# Patient Record
Sex: FEMALE | Race: WHITE | NOT HISPANIC OR LATINO | Employment: OTHER | URBAN - METROPOLITAN AREA
[De-identification: names, ages, dates, MRNs, and addresses within clinical notes are randomized per-mention and may not be internally consistent; named-entity substitution may affect disease eponyms.]

---

## 2022-01-24 PROCEDURE — U0004 COV-19 TEST NON-CDC HGH THRU: HCPCS | Performed by: FAMILY MEDICINE

## 2022-04-05 ENCOUNTER — OFFICE VISIT (OUTPATIENT)
Dept: FAMILY MEDICINE CLINIC | Facility: CLINIC | Age: 67
End: 2022-04-05

## 2022-04-05 VITALS
HEIGHT: 65 IN | WEIGHT: 172 LBS | BODY MASS INDEX: 28.66 KG/M2 | HEART RATE: 43 BPM | DIASTOLIC BLOOD PRESSURE: 88 MMHG | OXYGEN SATURATION: 97 % | SYSTOLIC BLOOD PRESSURE: 138 MMHG

## 2022-04-05 DIAGNOSIS — Z12.31 SCREENING MAMMOGRAM FOR BREAST CANCER: ICD-10-CM

## 2022-04-05 DIAGNOSIS — F32.A ANXIETY AND DEPRESSION: ICD-10-CM

## 2022-04-05 DIAGNOSIS — J44.9 CHRONIC OBSTRUCTIVE PULMONARY DISEASE, UNSPECIFIED COPD TYPE: ICD-10-CM

## 2022-04-05 DIAGNOSIS — R42 VERTIGO: ICD-10-CM

## 2022-04-05 DIAGNOSIS — M51.36 DDD (DEGENERATIVE DISC DISEASE), LUMBAR: Primary | ICD-10-CM

## 2022-04-05 DIAGNOSIS — K21.9 GASTROESOPHAGEAL REFLUX DISEASE, UNSPECIFIED WHETHER ESOPHAGITIS PRESENT: ICD-10-CM

## 2022-04-05 DIAGNOSIS — F41.9 ANXIETY AND DEPRESSION: ICD-10-CM

## 2022-04-05 DIAGNOSIS — D23.5 DERMOID CYST OF SKIN OF BACK: ICD-10-CM

## 2022-04-05 DIAGNOSIS — Z00.00 PREVENTATIVE HEALTH CARE: ICD-10-CM

## 2022-04-05 DIAGNOSIS — G44.209 TENSION HEADACHE: ICD-10-CM

## 2022-04-05 DIAGNOSIS — I10 PRIMARY HYPERTENSION: ICD-10-CM

## 2022-04-05 DIAGNOSIS — E55.9 VITAMIN D DEFICIENCY: ICD-10-CM

## 2022-04-05 PROBLEM — M51.369 DDD (DEGENERATIVE DISC DISEASE), LUMBAR: Status: ACTIVE | Noted: 2022-04-05

## 2022-04-05 PROCEDURE — 99204 OFFICE O/P NEW MOD 45 MIN: CPT | Performed by: NURSE PRACTITIONER

## 2022-04-05 PROCEDURE — 85025 COMPLETE CBC W/AUTO DIFF WBC: CPT | Performed by: NURSE PRACTITIONER

## 2022-04-05 PROCEDURE — 84443 ASSAY THYROID STIM HORMONE: CPT | Performed by: NURSE PRACTITIONER

## 2022-04-05 PROCEDURE — 36415 COLL VENOUS BLD VENIPUNCTURE: CPT | Performed by: NURSE PRACTITIONER

## 2022-04-05 PROCEDURE — 80053 COMPREHEN METABOLIC PANEL: CPT | Performed by: NURSE PRACTITIONER

## 2022-04-05 PROCEDURE — 80061 LIPID PANEL: CPT | Performed by: NURSE PRACTITIONER

## 2022-04-05 PROCEDURE — 82306 VITAMIN D 25 HYDROXY: CPT | Performed by: NURSE PRACTITIONER

## 2022-04-05 RX ORDER — OXYCODONE AND ACETAMINOPHEN 10; 325 MG/1; MG/1
1 TABLET ORAL EVERY 8 HOURS PRN
COMMUNITY
End: 2022-07-27 | Stop reason: SDUPTHER

## 2022-04-05 RX ORDER — TRAMADOL HYDROCHLORIDE 50 MG/1
50 TABLET ORAL 2 TIMES DAILY PRN
COMMUNITY
End: 2022-06-22

## 2022-04-05 RX ORDER — BACLOFEN 10 MG/1
10 TABLET ORAL NIGHTLY
Qty: 30 TABLET | Refills: 1 | Status: SHIPPED | OUTPATIENT
Start: 2022-04-05 | End: 2022-07-24

## 2022-04-05 RX ORDER — HYDROXYZINE PAMOATE 25 MG/1
25 CAPSULE ORAL 3 TIMES DAILY PRN
Qty: 30 CAPSULE | Refills: 0 | Status: SHIPPED | OUTPATIENT
Start: 2022-04-05 | End: 2022-05-25

## 2022-04-05 RX ORDER — SERTRALINE HYDROCHLORIDE 100 MG/1
150 TABLET, FILM COATED ORAL DAILY
Qty: 45 TABLET | Refills: 1 | Status: SHIPPED | OUTPATIENT
Start: 2022-04-05 | End: 2022-07-20

## 2022-04-05 RX ORDER — ATENOLOL 100 MG/1
100 TABLET ORAL DAILY
COMMUNITY
End: 2022-08-22

## 2022-04-05 RX ORDER — DEXLANSOPRAZOLE 60 MG/1
60 CAPSULE, DELAYED RELEASE ORAL DAILY
COMMUNITY

## 2022-04-05 RX ORDER — LOSARTAN POTASSIUM AND HYDROCHLOROTHIAZIDE 25; 100 MG/1; MG/1
1 TABLET ORAL DAILY
COMMUNITY
End: 2022-08-29 | Stop reason: SDUPTHER

## 2022-04-05 NOTE — PROGRESS NOTES
"Chief Complaint  Establish Care, Headache (Migraines daily for the last year ), Hypertension, and Cyst (Abscess on back that is getting bigger )    Subjective          Mona Sheffield presents to [facility]  History of Present Illness    Patient presents to [facility].    Patient is taking Atenolol and Hyzaar as prescribed for hypertension with no complications. She often feels dizzy, feels the room spinning with certain movements.     Patient is c/o migraine headaches, occurring daily. She has accompanying neck pain. Patient is applying heat and has a massager, provides some relief. Patient is taking Ibuprofen as needed.     Patient has chronic DDD, previously followed by Pain Management (Dr. Vishnu Sanchez). She also has a nerve stimulator. Patient previously on Percocet, has been out for two months. She reports pain radiates into bilateral lower extremities with intermittent numbness to left lower extremity.     Patient is taking Zoloft 100mg daily for depression. She reports symptoms are relatively stable but reports she \"has her days.\"     Patient is taking Dexilant for GERD, symptoms are stable. Patient has no acute complaints.     Patient has COPD, taking Albuterol as needed. She has chronic cough and exertional dyspnea. Patient reports she has to use her medication approximately three times weekly.    Patient had Colonoscopy approximately one year ago in [place] ([facility]). Mammogram is not UTD.     Advance Care Planning   ACP discussion was held with the patient during this visit. Patient does not have an advance directive, information provided.     PHQ-9 Depression Screening  Little interest or pleasure in doing things? 2-->more than half the days   Feeling down, depressed, or hopeless? 2-->more than half the days   Trouble falling or staying asleep, or sleeping too much? 3-->nearly every day   Feeling tired or having little energy? 3-->nearly every day " "  Poor appetite or overeating? 0-->not at all   Feeling bad about yourself - or that you are a failure or have let yourself or your family down? 0-->not at all   Trouble concentrating on things, such as reading the newspaper or watching television? 0-->not at all   Moving or speaking so slowly that other people could have noticed? Or the opposite - being so fidgety or restless that you have been moving around a lot more than usual? 0-->not at all   Thoughts that you would be better off dead, or of hurting yourself in some way? 0-->not at all   PHQ-9 Total Score 10   If you checked off any problems, how difficult have these problems made it for you to do your work, take care of things at home, or get along with other people? not difficult at all         Objective   Vital Signs:   /88 (BP Location: Left arm, Patient Position: Sitting, Cuff Size: Adult)   Pulse (!) 43   Ht 165.1 cm (65\")   Wt 78 kg (172 lb)   SpO2 97%   BMI 28.62 kg/m²     BMI is above normal parameters. Recommendations: exercise counseling/recommendations and nutrition counseling/recommendations       Physical Exam  Constitutional:       Appearance: Normal appearance.   HENT:      Head: Normocephalic.   Cardiovascular:      Rate and Rhythm: Normal rate and regular rhythm.   Pulmonary:      Effort: Pulmonary effort is normal.      Breath sounds: Normal breath sounds.   Abdominal:      General: Abdomen is flat. Bowel sounds are normal.      Palpations: Abdomen is soft.   Musculoskeletal:         General: Normal range of motion.      Cervical back: Neck supple.      Right lower leg: No edema.      Left lower leg: No edema.   Skin:     General: Skin is warm and dry.          Neurological:      Mental Status: She is alert and oriented to person, place, and time.      Gait: Gait is intact.   Psychiatric:         Attention and Perception: Attention normal.         Mood and Affect: Mood normal.         Speech: Speech normal.        Result Review : "                 Assessment and Plan    Diagnoses and all orders for this visit:    1. DDD (degenerative disc disease), lumbar (Primary)  Assessment & Plan:  1.  Request records from Dr. Vishnu Sanchez  2.  Once available, refer to pain management      2. Screening mammogram for breast cancer  -     Mammo Screening Digital Tomosynthesis Bilateral With CAD; Future    3. Preventative health care  Assessment & Plan:  1.  Check labs  2.  Colonoscopy reportedly up-to-date  3.  Mammogram    Orders:  -     CBC & Differential  -     Lipid Panel  -     TSH  -     Comprehensive Metabolic Panel    4. Vertigo  Assessment & Plan:  1.  Refer to vestibular therapy    Orders:  -     Ambulatory Referral to Physical Therapy Evaluate and treat, Vestibular    5. Vitamin D deficiency  -     Vitamin D 25 Hydroxy    6. Dermoid cyst of skin of back  Assessment & Plan:  1.  Refer to dermatology    Orders:  -     Ambulatory Referral to Dermatology    7. Primary hypertension  Assessment & Plan:  1.  Stable  2.  Continue atenolol and Hyzaar as prescribed      8. Anxiety and depression  Assessment & Plan:  1.  Increase Zoloft to 150 mg daily  2.  Add Vistaril to take as needed for anxiety      9. Gastroesophageal reflux disease, unspecified whether esophagitis present  Assessment & Plan:  1.Dexilant as prescribed      10. Chronic obstructive pulmonary disease, unspecified COPD type (HCC)  Assessment & Plan:  1.  Albuterol as needed for dyspnea or wheezing        11. Tension headache  Assessment & Plan:  1.  Baclofen 10 mg nightly as needed  2.  Recommended heat and massage  3.  Manage stress and anxiety  4.  If persists, will trial Imitrex  5.  Recommended eye exam        Other orders  -     sertraline (ZOLOFT) 100 MG tablet; Take 1.5 tablets by mouth Daily.  Dispense: 45 tablet; Refill: 1  -     hydrOXYzine pamoate (Vistaril) 25 MG capsule; Take 1 capsule by mouth 3 (Three) Times a Day As Needed for Anxiety.  Dispense: 30 capsule; Refill: 0  -      baclofen (LIORESAL) 10 MG tablet; Take 1 tablet by mouth Every Night.  Dispense: 30 tablet; Refill: 1    I spent 35 minutes caring for Mona on this date of service. This time includes time spent by me in the following activities:preparing for the visit, obtaining and/or reviewing a separately obtained history, performing a medically appropriate examination and/or evaluation , counseling and educating the patient/family/caregiver, ordering medications, tests, or procedures, documenting information in the medical record and care coordination  Follow Up   Return in about 4 weeks (around 5/3/2022).  Patient was given instructions and counseling regarding her condition or for health maintenance advice. Please see specific information pulled into the AVS if appropriate.

## 2022-04-05 NOTE — ASSESSMENT & PLAN NOTE
1.  Baclofen 10 mg nightly as needed  2.  Recommended heat and massage  3.  Manage stress and anxiety  4.  If persists, will trial Imitrex  5.  Recommended eye exam

## 2022-04-06 LAB
25(OH)D3 SERPL-MCNC: 38 NG/ML (ref 30–100)
ALBUMIN SERPL-MCNC: 4.4 G/DL (ref 3.5–5.2)
ALBUMIN/GLOB SERPL: 1.6 G/DL
ALP SERPL-CCNC: 54 U/L (ref 39–117)
ALT SERPL W P-5'-P-CCNC: 23 U/L (ref 1–33)
ANION GAP SERPL CALCULATED.3IONS-SCNC: 9.5 MMOL/L (ref 5–15)
AST SERPL-CCNC: 24 U/L (ref 1–32)
BASOPHILS # BLD AUTO: 0.04 10*3/MM3 (ref 0–0.2)
BASOPHILS NFR BLD AUTO: 0.6 % (ref 0–1.5)
BILIRUB SERPL-MCNC: 0.2 MG/DL (ref 0–1.2)
BUN SERPL-MCNC: 24 MG/DL (ref 8–23)
BUN/CREAT SERPL: 26.7 (ref 7–25)
CALCIUM SPEC-SCNC: 9.9 MG/DL (ref 8.6–10.5)
CHLORIDE SERPL-SCNC: 100 MMOL/L (ref 98–107)
CHOLEST SERPL-MCNC: 328 MG/DL (ref 0–200)
CO2 SERPL-SCNC: 29.5 MMOL/L (ref 22–29)
CREAT SERPL-MCNC: 0.9 MG/DL (ref 0.57–1)
DEPRECATED RDW RBC AUTO: 43.9 FL (ref 37–54)
EGFRCR SERPLBLD CKD-EPI 2021: 71.1 ML/MIN/1.73
EOSINOPHIL # BLD AUTO: 0.15 10*3/MM3 (ref 0–0.4)
EOSINOPHIL NFR BLD AUTO: 2.1 % (ref 0.3–6.2)
ERYTHROCYTE [DISTWIDTH] IN BLOOD BY AUTOMATED COUNT: 13.4 % (ref 12.3–15.4)
GLOBULIN UR ELPH-MCNC: 2.8 GM/DL
GLUCOSE SERPL-MCNC: 84 MG/DL (ref 65–99)
HCT VFR BLD AUTO: 42.7 % (ref 34–46.6)
HDLC SERPL-MCNC: 36 MG/DL (ref 40–60)
HGB BLD-MCNC: 14 G/DL (ref 12–15.9)
IMM GRANULOCYTES # BLD AUTO: 0.01 10*3/MM3 (ref 0–0.05)
IMM GRANULOCYTES NFR BLD AUTO: 0.1 % (ref 0–0.5)
LDLC SERPL CALC-MCNC: 221 MG/DL (ref 0–100)
LDLC/HDLC SERPL: 6.23 {RATIO}
LYMPHOCYTES # BLD AUTO: 1.55 10*3/MM3 (ref 0.7–3.1)
LYMPHOCYTES NFR BLD AUTO: 21.6 % (ref 19.6–45.3)
MCH RBC QN AUTO: 29.2 PG (ref 26.6–33)
MCHC RBC AUTO-ENTMCNC: 32.8 G/DL (ref 31.5–35.7)
MCV RBC AUTO: 89 FL (ref 79–97)
MONOCYTES # BLD AUTO: 0.51 10*3/MM3 (ref 0.1–0.9)
MONOCYTES NFR BLD AUTO: 7.1 % (ref 5–12)
NEUTROPHILS NFR BLD AUTO: 4.92 10*3/MM3 (ref 1.7–7)
NEUTROPHILS NFR BLD AUTO: 68.5 % (ref 42.7–76)
NRBC BLD AUTO-RTO: 0 /100 WBC (ref 0–0.2)
PLATELET # BLD AUTO: 158 10*3/MM3 (ref 140–450)
PMV BLD AUTO: 12.9 FL (ref 6–12)
POTASSIUM SERPL-SCNC: 4.7 MMOL/L (ref 3.5–5.2)
PROT SERPL-MCNC: 7.2 G/DL (ref 6–8.5)
RBC # BLD AUTO: 4.8 10*6/MM3 (ref 3.77–5.28)
SODIUM SERPL-SCNC: 139 MMOL/L (ref 136–145)
TRIGL SERPL-MCNC: 339 MG/DL (ref 0–150)
TSH SERPL DL<=0.05 MIU/L-ACNC: 0.8 UIU/ML (ref 0.27–4.2)
VLDLC SERPL-MCNC: 71 MG/DL (ref 5–40)
WBC NRBC COR # BLD: 7.18 10*3/MM3 (ref 3.4–10.8)

## 2022-04-06 NOTE — PROGRESS NOTES
Patient's cholesterol is quite elevated at 328 with elevated triglycerides at 339.  Recommendations would be to decrease fried and fatty foods and to start on a medication to take daily.  Kidney function is normal but patient appears slightly dehydrated, would recommend increased water consumption.  The rest of her labs look normal.  If patient agreeable to a statin, let me know

## 2022-04-12 ENCOUNTER — TELEPHONE (OUTPATIENT)
Dept: FAMILY MEDICINE CLINIC | Facility: CLINIC | Age: 67
End: 2022-04-12

## 2022-04-12 RX ORDER — ATORVASTATIN CALCIUM 20 MG/1
20 TABLET, FILM COATED ORAL DAILY
Qty: 30 TABLET | Refills: 2 | Status: SHIPPED | OUTPATIENT
Start: 2022-04-12 | End: 2022-06-20

## 2022-04-12 NOTE — TELEPHONE ENCOUNTER
Pt agreed to start a statin.  She also asked if she was supposed to stop taking the atenolol?  I saw that you d/c'd the atenolol with chlorthalidone.  Is she supposed to be taking the atenolol 100 mg that is on her med list?  Thank you.

## 2022-04-12 NOTE — TELEPHONE ENCOUNTER
I did not discontinue the atenolol or the Hyzaar.  She had complaints of symptoms consistent with vertigo and I referred her for treatment.  If the dizziness persists after treatment, we will discuss possibly adjusting the atenolol.  I will send Lipitor to the pharmacy.

## 2022-05-04 ENCOUNTER — OFFICE VISIT (OUTPATIENT)
Dept: FAMILY MEDICINE CLINIC | Facility: CLINIC | Age: 67
End: 2022-05-04

## 2022-05-04 VITALS — HEART RATE: 48 BPM | TEMPERATURE: 97.7 F | OXYGEN SATURATION: 98 %

## 2022-05-04 DIAGNOSIS — J44.1 CHRONIC OBSTRUCTIVE PULMONARY DISEASE WITH ACUTE EXACERBATION: ICD-10-CM

## 2022-05-04 DIAGNOSIS — R05.9 COUGH: Primary | ICD-10-CM

## 2022-05-04 DIAGNOSIS — R50.9 FEVER, UNSPECIFIED FEVER CAUSE: ICD-10-CM

## 2022-05-04 PROBLEM — J22 LOWER RESPIRATORY INFECTION: Status: ACTIVE | Noted: 2022-05-04

## 2022-05-04 LAB
EXPIRATION DATE: NORMAL
FLUAV AG UPPER RESP QL IA.RAPID: NOT DETECTED
FLUBV AG UPPER RESP QL IA.RAPID: NOT DETECTED
INTERNAL CONTROL: NORMAL
Lab: NORMAL
SARS-COV-2 RNA RESP QL NAA+PROBE: NOT DETECTED

## 2022-05-04 PROCEDURE — 87428 SARSCOV & INF VIR A&B AG IA: CPT | Performed by: NURSE PRACTITIONER

## 2022-05-04 PROCEDURE — 99214 OFFICE O/P EST MOD 30 MIN: CPT | Performed by: NURSE PRACTITIONER

## 2022-05-04 RX ORDER — AMOXICILLIN 500 MG/1
500 CAPSULE ORAL 2 TIMES DAILY
Qty: 20 CAPSULE | Refills: 0 | Status: SHIPPED | OUTPATIENT
Start: 2022-05-04 | End: 2022-06-22

## 2022-05-04 RX ORDER — METHYLPREDNISOLONE 4 MG/1
TABLET ORAL
Qty: 21 TABLET | Refills: 0 | Status: SHIPPED | OUTPATIENT
Start: 2022-05-04 | End: 2022-07-27

## 2022-05-04 RX ORDER — BENZONATATE 100 MG/1
100 CAPSULE ORAL 3 TIMES DAILY PRN
Qty: 30 CAPSULE | Refills: 0 | Status: SHIPPED | OUTPATIENT
Start: 2022-05-04 | End: 2022-06-22

## 2022-05-04 NOTE — PROGRESS NOTES
Chief Complaint  Migraine, Earache, Sore Throat, Generalized Body Aches, and Chills    Subjective          Mona Sheffield presents to [facility]  History of Present Illness    Patient presents with headache, bilateral ear pain, sore throat, loose cough, wheezing body aches and chills.  Symptoms have been present for approximately 2 weeks.  She denies any dizziness, vision changes, syncope, chest pain or dyspnea.  Patient does have COPD, using albuterol as needed for dyspnea or wheezing.    Objective   Vital Signs:   Pulse (!) 48   Temp 97.7 °F (36.5 °C) (Skin)   SpO2 98%       Physical Exam  Constitutional:       Appearance: Normal appearance.   HENT:      Head: Normocephalic.      Right Ear: Tympanic membrane normal.      Left Ear: Tympanic membrane normal.      Mouth/Throat:      Pharynx: Oropharynx is clear.   Cardiovascular:      Rate and Rhythm: Normal rate and regular rhythm.   Pulmonary:      Effort: Pulmonary effort is normal.      Breath sounds: Normal breath sounds.   Abdominal:      General: Abdomen is flat. Bowel sounds are normal.      Palpations: Abdomen is soft.   Musculoskeletal:         General: Normal range of motion.      Cervical back: Neck supple.      Right lower leg: No edema.      Left lower leg: No edema.   Skin:     General: Skin is warm and dry.   Neurological:      Mental Status: She is alert and oriented to person, place, and time.      Gait: Gait is intact.   Psychiatric:         Attention and Perception: Attention normal.         Mood and Affect: Mood normal.         Speech: Speech normal.        Result Review :                 Assessment and Plan    Diagnoses and all orders for this visit:    1. Cough (Primary)  Assessment & Plan:  1.  Tessalon Perles as needed for cough    Orders:  -     Covid-19 + Flu A&B AG, Veritor    2. Fever, unspecified fever cause  Assessment & Plan:  1.  Influenza and COVID-19 testing are negative    Orders:  -     Covid-19 +  Flu A&B AG, Veritor    3. Chronic obstructive pulmonary disease with acute exacerbation (HCC)  Assessment & Plan:  1.  Amoxicillin 500 mg twice daily x10 days  2.  Medrol Dosepak  3.  Continue albuterol as needed for dyspnea or wheezing  4.  Call if symptoms persist or worsen        Other orders  -     amoxicillin (AMOXIL) 500 MG capsule; Take 1 capsule by mouth 2 (Two) Times a Day.  Dispense: 20 capsule; Refill: 0  -     methylPREDNISolone (MEDROL) 4 MG dose pack; Take as directed on package instructions.  Dispense: 21 tablet; Refill: 0  -     benzonatate (Tessalon Perles) 100 MG capsule; Take 1 capsule by mouth 3 (Three) Times a Day As Needed for Cough.  Dispense: 30 capsule; Refill: 0           I spent 20 minutes caring for Mona on this date of service. This time includes time spent by me in the following activities:preparing for the visit, reviewing tests, obtaining and/or reviewing a separately obtained history, performing a medically appropriate examination and/or evaluation , counseling and educating the patient/family/caregiver, ordering medications, tests, or procedures, documenting information in the medical record and care coordination  Follow Up   Return if symptoms worsen or fail to improve.  Patient was given instructions and counseling regarding her condition or for health maintenance advice. Please see specific information pulled into the AVS if appropriate.

## 2022-05-25 RX ORDER — HYDROXYZINE PAMOATE 25 MG/1
CAPSULE ORAL
Qty: 30 CAPSULE | Refills: 0 | Status: SHIPPED | OUTPATIENT
Start: 2022-05-25 | End: 2022-07-11

## 2022-06-20 RX ORDER — ATORVASTATIN CALCIUM 20 MG/1
20 TABLET, FILM COATED ORAL DAILY
Qty: 90 TABLET | Refills: 0 | Status: SHIPPED | OUTPATIENT
Start: 2022-06-20 | End: 2022-10-25

## 2022-06-22 ENCOUNTER — OFFICE VISIT (OUTPATIENT)
Dept: FAMILY MEDICINE CLINIC | Facility: CLINIC | Age: 67
End: 2022-06-22

## 2022-06-22 VITALS
OXYGEN SATURATION: 95 % | BODY MASS INDEX: 29.37 KG/M2 | DIASTOLIC BLOOD PRESSURE: 80 MMHG | HEART RATE: 53 BPM | HEIGHT: 64 IN | WEIGHT: 172 LBS | SYSTOLIC BLOOD PRESSURE: 138 MMHG

## 2022-06-22 DIAGNOSIS — G43.909 MIGRAINE WITHOUT STATUS MIGRAINOSUS, NOT INTRACTABLE, UNSPECIFIED MIGRAINE TYPE: ICD-10-CM

## 2022-06-22 DIAGNOSIS — M51.36 DDD (DEGENERATIVE DISC DISEASE), LUMBAR: Primary | ICD-10-CM

## 2022-06-22 DIAGNOSIS — I10 PRIMARY HYPERTENSION: ICD-10-CM

## 2022-06-22 PROCEDURE — 99214 OFFICE O/P EST MOD 30 MIN: CPT | Performed by: NURSE PRACTITIONER

## 2022-06-22 PROCEDURE — 96372 THER/PROPH/DIAG INJ SC/IM: CPT | Performed by: NURSE PRACTITIONER

## 2022-06-22 RX ORDER — CLONIDINE HYDROCHLORIDE 0.1 MG/1
TABLET ORAL
Qty: 180 TABLET | Refills: 0 | Status: SHIPPED | OUTPATIENT
Start: 2022-06-22 | End: 2022-07-27

## 2022-06-22 RX ORDER — MELOXICAM 7.5 MG/1
7.5 TABLET ORAL DAILY
Qty: 30 TABLET | Refills: 1 | Status: SHIPPED | OUTPATIENT
Start: 2022-06-22 | End: 2022-08-22

## 2022-06-22 RX ORDER — SUMATRIPTAN 25 MG/1
25 TABLET, FILM COATED ORAL ONCE AS NEEDED
Qty: 8 TABLET | Refills: 0 | Status: SHIPPED | OUTPATIENT
Start: 2022-06-22

## 2022-06-22 RX ORDER — CLONIDINE HYDROCHLORIDE 0.1 MG/1
0.1 TABLET ORAL 2 TIMES DAILY
Qty: 60 TABLET | Refills: 0 | Status: SHIPPED | OUTPATIENT
Start: 2022-06-22 | End: 2022-06-22

## 2022-06-22 RX ORDER — KETOROLAC TROMETHAMINE 30 MG/ML
30 INJECTION, SOLUTION INTRAMUSCULAR; INTRAVENOUS ONCE
Status: COMPLETED | OUTPATIENT
Start: 2022-06-22 | End: 2022-06-22

## 2022-06-22 RX ADMIN — KETOROLAC TROMETHAMINE 30 MG: 30 INJECTION, SOLUTION INTRAMUSCULAR; INTRAVENOUS at 15:05

## 2022-06-22 NOTE — ASSESSMENT & PLAN NOTE
1.  Continue atenolol and Hyzaar  2.  Add clonidine 0.1 mg twice daily  3.  Monitor blood pressure at home and call if greater than 160/90  4.  Follow-up in 2 weeks for repeat BP check

## 2022-06-22 NOTE — ASSESSMENT & PLAN NOTE
1.  Likely related to hypertension  2.  Imitrex 25 mg as needed for migraine headache  3.  Toradol 30 mg IM x1

## 2022-06-22 NOTE — PROGRESS NOTES
"Chief Complaint  Follow-up (Was seen in urgent care for HTN and migraines/still having a lot of trouble with severe headaches) and Arthritis (Flare up and pain is really bad )    Subjective        Mona Sheffield presents to [facility]  History of Present Illness    Patient presents with increasing hypertension and migraine headaches. Patient is taking Atenolol 100mg and Hyzaar daily.  Blood pressure fluctuating, increasing at night.  She was previously on Botox for migraine headaches. Patient is taking Excedrin PRN. Patient for acute sinusitis at urgent care, finished antibiotics and reports cough, ear pain and sinus pressure have all improved.  Patient denies fever, dizziness, chest pain or dyspnea.    Patient also complaining of increasing back and joint pain.  She has a history of lumbar DDD and osteoarthritis.  Patient previously followed by pain management, taking Percocet as needed.  She is requesting referral back to pain management.    Objective   Vital Signs:  /80 (BP Location: Left arm, Patient Position: Sitting, Cuff Size: Adult)   Pulse 53   Ht 162.6 cm (64\")   Wt 78 kg (172 lb)   SpO2 95%   BMI 29.52 kg/m²   Estimated body mass index is 29.52 kg/m² as calculated from the following:    Height as of this encounter: 162.6 cm (64\").    Weight as of this encounter: 78 kg (172 lb).          Physical Exam  Constitutional:       Appearance: Normal appearance.   HENT:      Head: Normocephalic.   Cardiovascular:      Rate and Rhythm: Normal rate and regular rhythm.   Pulmonary:      Effort: Pulmonary effort is normal.      Breath sounds: Normal breath sounds.   Abdominal:      General: Abdomen is flat. Bowel sounds are normal.      Palpations: Abdomen is soft.   Musculoskeletal:         General: Normal range of motion.      Cervical back: Neck supple.      Right lower leg: No edema.      Left lower leg: No edema.   Skin:     General: Skin is warm and dry.   Neurological: "      Mental Status: She is alert and oriented to person, place, and time.      Gait: Gait is intact.   Psychiatric:         Attention and Perception: Attention normal.         Mood and Affect: Mood normal.         Speech: Speech normal.        Result Review :    CMP    CMP 4/5/22   Glucose 84   BUN 24 (A)   Creatinine 0.90   Sodium 139   Potassium 4.7   Chloride 100   Calcium 9.9   Albumin 4.40   Total Bilirubin 0.2   Alkaline Phosphatase 54   AST (SGOT) 24   ALT (SGPT) 23   (A) Abnormal value            CBC    CBC 4/5/22   WBC 7.18   RBC 4.80   Hemoglobin 14.0   Hematocrit 42.7   MCV 89.0   MCH 29.2   MCHC 32.8   RDW 13.4   Platelets 158           Lipid Panel    Lipid Panel 4/5/22   Total Cholesterol 328 (A)   Triglycerides 339 (A)   HDL Cholesterol 36 (A)   VLDL Cholesterol 71 (A)   LDL Cholesterol  221 (A)   LDL/HDL Ratio 6.23   (A) Abnormal value            TSH    TSH 4/5/22   TSH 0.798                         Assessment and Plan   Diagnoses and all orders for this visit:    1. DDD (degenerative disc disease), lumbar (Primary)  Assessment & Plan:  1.  Start Mobic 7.5 mg daily  2.  Refer back to pain management    Orders:  -     Ambulatory Referral to Pain Management  -     ketorolac (TORADOL) injection 30 mg    2. Primary hypertension  Assessment & Plan:  1.  Continue atenolol and Hyzaar  2.  Add clonidine 0.1 mg twice daily  3.  Monitor blood pressure at home and call if greater than 160/90  4.  Follow-up in 2 weeks for repeat BP check      3. Migraine without status migrainosus, not intractable, unspecified migraine type  Assessment & Plan:  1.  Likely related to hypertension  2.  Imitrex 25 mg as needed for migraine headache  3.  Toradol 30 mg IM x1      Other orders  -     SUMAtriptan (Imitrex) 25 MG tablet; Take 1 tablet by mouth 1 (One) Time As Needed for Migraine for up to 1 dose. Take one tablet at onset of headache. May repeat dose one time in 2 hours if headache not relieved.  Dispense: 8 tablet;  Refill: 0  -     Discontinue: cloNIDine (Catapres) 0.1 MG tablet; Take 1 tablet by mouth 2 (Two) Times a Day.  Dispense: 60 tablet; Refill: 0  -     meloxicam (Mobic) 7.5 MG tablet; Take 1 tablet by mouth Daily.  Dispense: 30 tablet; Refill: 1         I spent 30 minutes caring for Mona on this date of service. This time includes time spent by me in the following activities:preparing for the visit, obtaining and/or reviewing a separately obtained history, performing a medically appropriate examination and/or evaluation , counseling and educating the patient/family/caregiver, ordering medications, tests, or procedures, referring and communicating with other health care professionals , documenting information in the medical record, independently interpreting results and communicating that information with the patient/family/caregiver and care coordination  Follow Up   Return in about 2 weeks (around 7/6/2022) for HTN.  Patient was given instructions and counseling regarding her condition or for health maintenance advice. Please see specific information pulled into the AVS if appropriate.

## 2022-06-28 ENCOUNTER — TELEPHONE (OUTPATIENT)
Dept: FAMILY MEDICINE CLINIC | Facility: CLINIC | Age: 67
End: 2022-06-28

## 2022-06-28 NOTE — TELEPHONE ENCOUNTER
Patient called in this morning and asked if she could just wait to come in for her repeat BP check at time of next appt instead of in the two weeks as mentioned in visit notes of last visit, as she didn't feel it was necessary. She reports her BP as being around average of 125/80. I said I would send a note back just to be sure but also reiterated from visit notes that she needs to call and report if her BP is over 160/90.

## 2022-06-29 NOTE — TELEPHONE ENCOUNTER
If she is monitoring regularly at home and ranging < 140/90 - she does not need to come in for repeat.

## 2022-07-11 RX ORDER — HYDROXYZINE PAMOATE 25 MG/1
CAPSULE ORAL
Qty: 30 CAPSULE | Refills: 0 | Status: SHIPPED | OUTPATIENT
Start: 2022-07-11 | End: 2023-01-13 | Stop reason: SDUPTHER

## 2022-07-20 RX ORDER — SERTRALINE HYDROCHLORIDE 100 MG/1
TABLET, FILM COATED ORAL
Qty: 45 TABLET | Refills: 1 | Status: SHIPPED | OUTPATIENT
Start: 2022-07-20 | End: 2022-09-29

## 2022-07-24 RX ORDER — BACLOFEN 10 MG/1
10 TABLET ORAL NIGHTLY
Qty: 30 TABLET | Refills: 1 | Status: SHIPPED | OUTPATIENT
Start: 2022-07-24 | End: 2022-10-05

## 2022-07-27 ENCOUNTER — OFFICE VISIT (OUTPATIENT)
Dept: FAMILY MEDICINE CLINIC | Facility: CLINIC | Age: 67
End: 2022-07-27

## 2022-07-27 VITALS
HEIGHT: 64 IN | HEART RATE: 47 BPM | OXYGEN SATURATION: 96 % | WEIGHT: 175 LBS | BODY MASS INDEX: 29.88 KG/M2 | DIASTOLIC BLOOD PRESSURE: 98 MMHG | SYSTOLIC BLOOD PRESSURE: 152 MMHG

## 2022-07-27 DIAGNOSIS — I10 PRIMARY HYPERTENSION: Primary | ICD-10-CM

## 2022-07-27 DIAGNOSIS — J44.9 CHRONIC OBSTRUCTIVE PULMONARY DISEASE, UNSPECIFIED COPD TYPE: ICD-10-CM

## 2022-07-27 DIAGNOSIS — Z03.89 ENCOUNTER FOR OBSERVATION FOR OTHER SUSPECTED DISEASES AND CONDITIONS RULED OUT: ICD-10-CM

## 2022-07-27 DIAGNOSIS — M51.36 DDD (DEGENERATIVE DISC DISEASE), LUMBAR: ICD-10-CM

## 2022-07-27 DIAGNOSIS — R07.9 CHEST PAIN, UNSPECIFIED TYPE: ICD-10-CM

## 2022-07-27 DIAGNOSIS — R10.10 PAIN OF UPPER ABDOMEN: ICD-10-CM

## 2022-07-27 DIAGNOSIS — R41.89 COGNITIVE IMPAIRMENT: ICD-10-CM

## 2022-07-27 PROCEDURE — G0439 PPPS, SUBSEQ VISIT: HCPCS | Performed by: NURSE PRACTITIONER

## 2022-07-27 PROCEDURE — 1170F FXNL STATUS ASSESSED: CPT | Performed by: NURSE PRACTITIONER

## 2022-07-27 PROCEDURE — 80307 DRUG TEST PRSMV CHEM ANLYZR: CPT | Performed by: NURSE PRACTITIONER

## 2022-07-27 PROCEDURE — 1125F AMNT PAIN NOTED PAIN PRSNT: CPT | Performed by: NURSE PRACTITIONER

## 2022-07-27 PROCEDURE — 96160 PT-FOCUSED HLTH RISK ASSMT: CPT | Performed by: NURSE PRACTITIONER

## 2022-07-27 PROCEDURE — 1160F RVW MEDS BY RX/DR IN RCRD: CPT | Performed by: NURSE PRACTITIONER

## 2022-07-27 RX ORDER — MEMANTINE HYDROCHLORIDE 5 MG/1
5 TABLET ORAL DAILY
Qty: 30 TABLET | Refills: 1 | Status: SHIPPED | OUTPATIENT
Start: 2022-07-27 | End: 2022-07-27

## 2022-07-27 RX ORDER — OXYCODONE AND ACETAMINOPHEN 10; 325 MG/1; MG/1
1 TABLET ORAL EVERY 8 HOURS PRN
Qty: 90 TABLET | Refills: 0 | Status: SHIPPED | OUTPATIENT
Start: 2022-07-27 | End: 2022-11-08 | Stop reason: SDUPTHER

## 2022-07-27 RX ORDER — MEMANTINE HYDROCHLORIDE 5 MG/1
5 TABLET ORAL DAILY
Qty: 90 TABLET | Refills: 1 | Status: SHIPPED | OUTPATIENT
Start: 2022-07-27 | End: 2023-01-23

## 2022-07-27 RX ORDER — CLONIDINE HYDROCHLORIDE 0.2 MG/1
0.2 TABLET ORAL 2 TIMES DAILY
Qty: 60 TABLET | Refills: 1 | Status: SHIPPED | OUTPATIENT
Start: 2022-07-27 | End: 2022-07-27

## 2022-07-27 RX ORDER — CLONIDINE HYDROCHLORIDE 0.2 MG/1
TABLET ORAL
Qty: 180 TABLET | Refills: 1 | Status: SHIPPED | OUTPATIENT
Start: 2022-07-27 | End: 2023-01-23

## 2022-07-27 NOTE — ASSESSMENT & PLAN NOTE
1.  Continue Atenolol and Hyzaar as prescribed  2.  Increase clonidine to 0.2 mg twice daily  3.  Refer to cardiology

## 2022-07-27 NOTE — ASSESSMENT & PLAN NOTE
1.  Pain at site of palpation but no bulge noted indicating a hernia.  There is a possibility of hiatal hernia.  I recommended she ensure that she is taking Dexilant as prescribed.  Add probiotic.  If pain persists, will refer to GI for EGD.

## 2022-07-27 NOTE — PROGRESS NOTES
"The ABCs of the Annual Wellness Visit  Initial Medicare Wellness Visit    Chief Complaint   Patient presents with   • Medicare Wellness-Initial Visit   • Follow-up     Follow up for HTN      Subjective   History of Present Illness:  Mona Sheffield is a 66 y.o. female who presents for an Initial Medicare Wellness Visit.    Patient seen on 6/22 with c/o increasing back and joint pain r/t lumbar DDD and OA. She was referred to Pain Management due to previous use of Percocet. Patient started on Mobic and is taking as prescribed, denies any relief in pain. She reports she is waiting on workman's comp insurance to schedule appointment.     Patient is taking Atenolol, Hyzaar and Clonidine as prescribed for hypertension. Patient reports BP at home this morning was 179/100. By evening, patient feels fatigued and confused. Symptoms improve with BP medication. Patient is taking Lipitor 20mg daily for hyperlipidemia.     Patient has COPD, using Albuterol PRN. She does c/o intermittent chest tightness and dyspnea. Patient's father had CAD with CABG.     Patient UTD on Colonoscopy, completed approximately one year ago. Polyps found and advised to repeat in 3 years. Patient reports bowel movements are regular without blood or mucus. She does c/o LLQ pain, sharp in nature. Pain \"comes and goes.\"  She reports the last time she had this time, there was a large ovarian cyst and hysterectomy was completed. Patient does feel a bulge when pain occurs.     The following portions of the patient's history were reviewed and   updated as appropriate: allergies, current medications, past family history, past medical history, past social history, past surgical history and problem list.     Compared to one year ago, the patient feels her physical   health is worse.    Compared to one year ago, the patient feels her mental   health is the same.    Recent Hospitalizations:  She was not admitted to the hospital during the last year.       Current " Medical Providers:  Patient Care Team:  Teresa Fernandez APRN as PCP - General (Nurse Practitioner)    Outpatient Medications Prior to Visit   Medication Sig Dispense Refill   • albuterol (PROVENTIL) (2.5 MG/3ML) 0.083% nebulizer solution albuterol sulfate 2.5 mg/3 mL (0.083 %) solution for nebulization     • albuterol sulfate  (90 Base) MCG/ACT inhaler albuterol sulfate HFA 90 mcg/actuation aerosol inhaler   INHALE 2 PUFFS BY MOUTH EVERY 6 HOURS AS NEEDED     • atenolol (TENORMIN) 100 MG tablet Take 100 mg by mouth Daily.     • atorvastatin (LIPITOR) 20 MG tablet TAKE 1 TABLET BY MOUTH DAILY 90 tablet 0   • baclofen (LIORESAL) 10 MG tablet TAKE 1 TABLET BY MOUTH EVERY NIGHT 30 tablet 1   • dexlansoprazole (DEXILANT) 60 MG capsule Take 60 mg by mouth Daily.     • fluticasone (FLONASE) 50 MCG/ACT nasal spray fluticasone propionate 50 mcg/actuation nasal spray,suspension     • hydrOXYzine pamoate (VISTARIL) 25 MG capsule TAKE 1 CAPSULE BY MOUTH THREE TIMES DAILY AS NEEDED FOR ANXIETY 30 capsule 0   • ibuprofen (ADVIL,MOTRIN) 800 MG tablet ibuprofen 800 mg tablet   TAKE 1 TABLET BY MOUTH THREE TIMES DAILY AS NEEDED   1 month only- need to be seen in office     • lidocaine (LIDODERM) 5 % Lidoderm 5 % topical patch   APPLY 1 PATCH BY TRANSDERMAL ROUTE ONCE DAILY (MAY WEAR UP TO 12HOURS.)     • Lidocaine, Anorectal, (LMX 5) 5 % cream cream lidocaine 5 % topical cream   Apply 1 application by topical route.     • losartan-hydrochlorothiazide (HYZAAR) 100-25 MG per tablet Take 1 tablet by mouth Daily.     • meloxicam (Mobic) 7.5 MG tablet Take 1 tablet by mouth Daily. 30 tablet 1   • sertraline (ZOLOFT) 100 MG tablet TAKE 1 AND 1/2 TABLETS BY MOUTH DAILY 45 tablet 1   • SUMAtriptan (Imitrex) 25 MG tablet Take 1 tablet by mouth 1 (One) Time As Needed for Migraine for up to 1 dose. Take one tablet at onset of headache. May repeat dose one time in 2 hours if headache not relieved. 8 tablet 0   • cloNIDine (CATAPRES)  0.1 MG tablet TAKE 1 TABLET BY MOUTH TWICE DAILY 180 tablet 0   • oxyCODONE-acetaminophen (PERCOCET)  MG per tablet Take 1 tablet by mouth Every 8 (Eight) Hours As Needed for Moderate Pain .     • methylPREDNISolone (MEDROL) 4 MG dose pack Take as directed on package instructions. 21 tablet 0     No facility-administered medications prior to visit.       Opioid medication/s are on active medication list.  and I have evaluated her active treatment plan and pain score trends (see table).  Vitals:    07/27/22 1356   PainSc:   6   PainLoc: Hand     I have reviewed the chart for potential of high risk medication and harmful drug interactions in the elderly.            Aspirin is not on active medication list.  Aspirin use is not indicated based on review of current medical condition/s. Risk of harm outweighs potential benefits.  .    Patient Active Problem List   Diagnosis   • DDD (degenerative disc disease), lumbar   • Screening mammogram for breast cancer   • Preventative health care   • Vertigo   • Vitamin D deficiency   • Dermoid cyst of skin of back   • Primary hypertension   • Anxiety and depression   • Gastroesophageal reflux disease   • Chronic obstructive pulmonary disease (HCC)   • Tension headache   • Fever   • Cough   • Lower respiratory infection   • Migraine without status migrainosus, not intractable   • Chest pain   • Encounter for observation for other suspected diseases and conditions ruled out    • Pain of upper abdomen   • Cognitive impairment     Advance Care Planning  Advance Directive is not on file.  ACP discussion was held with the patient during this visit. Patient does not have an advance directive, information provided.    Review of Systems   Constitutional: Negative for fever.   Respiratory: Positive for shortness of breath. Negative for cough.    Cardiovascular: Positive for chest pain. Negative for leg swelling.   Gastrointestinal: Negative for blood in stool, constipation and  "diarrhea.   Genitourinary: Negative for dysuria and urgency.   Skin: Negative for rash.   Neurological: Negative for dizziness.   Psychiatric/Behavioral: The patient is not nervous/anxious.         Objective       Vitals:    22 1356   BP: 152/98   BP Location: Left arm   Patient Position: Sitting   Cuff Size: Adult   Pulse: (!) 47   SpO2: 96%   Weight: 79.4 kg (175 lb)   Height: 162.6 cm (64\")   PainSc:   6   PainLoc: Hand     Estimated body mass index is 30.04 kg/m² as calculated from the following:    Height as of this encounter: 162.6 cm (64\").    Weight as of this encounter: 79.4 kg (175 lb).    BMI is >= 30 and <35. (Class 1 Obesity). The following options were offered after discussion;: nutrition counseling/recommendations      Does the patient have evidence of cognitive impairment? Yes     ATTENTION  What is the year: correct  What is the month of the year: correct  What is the day of the week?: correct  What is the date?: incorrect  MEMORY  Repeat address three times, only score third attempt: Gareth Garcia [address] [place] [address]: 4  HOW MANY ANIMALS DID THE PATIENT NAME  Verbal Fluency -- Animal Names (0-25): 14-16  CLOCK DRAWING  Clock Drawing: All Correct  MEMORY RECALL  Tell me what you remember about that name and address we were repeating at the beginnin  ACE TOTAL SCORE  Total ACE Score - <25/30 strongly suggests cognitive impairment; <21/30 almost certainly shows dementia: 17      Physical Exam  Constitutional:       Appearance: Normal appearance.   HENT:      Head: Normocephalic.   Cardiovascular:      Rate and Rhythm: Normal rate and regular rhythm.   Pulmonary:      Effort: Pulmonary effort is normal.      Breath sounds: Normal breath sounds.   Abdominal:      General: Abdomen is flat. Bowel sounds are normal.      Palpations: Abdomen is soft.          Comments: Incision at site of line     Musculoskeletal:         General: Normal range of motion.      Cervical back: Neck " supple.      Right lower leg: No edema.      Left lower leg: No edema.   Skin:     General: Skin is warm and dry.   Neurological:      Mental Status: She is alert and oriented to person, place, and time.      Gait: Gait is intact.   Psychiatric:         Attention and Perception: Attention normal.         Mood and Affect: Mood normal.         Speech: Speech normal.               HEALTH RISK ASSESSMENT    Smoking Status:  Social History     Tobacco Use   Smoking Status Current Every Day Smoker   • Packs/day: 0.50   • Years: 50.00   • Pack years: 25.00   Smokeless Tobacco Never Used     Alcohol Consumption:  Social History     Substance and Sexual Activity   Alcohol Use Yes   • Alcohol/week: 2.0 standard drinks   • Types: 1 Glasses of wine, 1 Cans of beer per week    Comment: OCC     Fall Risk Screen:    [facility] Fall Risk Assessment was completed, and patient is at HIGH risk for falls. Assessment completed on:7/27/2022    Depression Screen:   PHQ-2/PHQ-9 Depression Screening 7/27/2022   Little Interest or Pleasure in Doing Things 1-->several days   Feeling Down, Depressed or Hopeless 3-->nearly every day   Trouble Falling or Staying Asleep, or Sleeping Too Much 3-->nearly every day   Feeling Tired or Having Little Energy 2-->more than half the days   Poor Appetite or Overeating 3-->nearly every day   Feeling Bad about Yourself - or that You are a Failure or Have Let Yourself or Your Family Down 3-->nearly every day   Trouble Concentrating on Things, Such as Reading the Newspaper or Watching Television 3-->nearly every day   Moving or Speaking So Slowly that Other People Could Have Noticed? Or the Opposite - Being So Fidgety 0-->not at all   Thoughts that You Would be Better Off Dead or of Hurting Yourself in Some Way 0-->not at all   PHQ-9: Brief Depression Severity Measure Score 18   If You Checked Off Any Problems, How Difficult Have These Problems Made It For You to Do Your Work, Take Care of Things at Home, or Get  Along with Other People? somewhat difficult       Health Habits and Functional and Cognitive Screening:  Functional & Cognitive Status 7/27/2022   Do you have difficulty preparing food and eating? No   Do you have difficulty bathing yourself, getting dressed or grooming yourself? No   Do you have difficulty using the toilet? No   Do you have difficulty moving around from place to place? No   Do you have trouble with steps or getting out of a bed or a chair? No   Do you need help using the phone?  No   Are you deaf or do you have serious difficulty hearing?  No   Do you need help with transportation? No   Do you need help shopping? No   Do you need help preparing meals?  No   Do you need help with housework?  No   Do you need help with laundry? No   Do you need help taking your medications? No   Do you need help managing money? No   Do you ever drive or ride in a car without wearing a seat belt? No   Have you felt unusual stress, anger or loneliness in the last month? No   Who do you live with? Sibling   If you need help, do you have trouble finding someone available to you? No   Have you been bothered in the last four weeks by sexual problems? No   Do you have difficulty concentrating, remembering or making decisions? Yes       Age-appropriate Screening Schedule:  Refer to the list below for future screening recommendations based on patient's age, sex and/or medical conditions. Orders for these recommended tests are listed in the plan section. The patient has been provided with a written plan.    Health Maintenance   Topic Date Due   • DXA SCAN  Never done   • TDAP/TD VACCINES (1 - Tdap) Never done   • ZOSTER VACCINE (1 of 2) Never done   • INFLUENZA VACCINE  10/01/2022   • MAMMOGRAM  10/28/2022            Assessment & Plan   CMS Preventative Services Quick Reference  Risk Factors Identified During Encounter  Cardiovascular Disease  Chronic Pain   Dementia/Memory   Depression/Dysphoria  Fall Risk-High or  Moderate  Immunizations Discussed/Encouraged (specific Immunizations; Tdap, Influenza, Pneumococcal 23, Prevnar 20 (Pneumococcal 20-valent conjugate), Shingrix and COVID19  Obesity/Overweight   Tobacco Use/Dependance (use dotphrase .tobaccocessation for documentation)  The above risks/problems have been discussed with the patient.  Follow up actions/plans if indicated are seen below in the Assessment/Plan Section.  Pertinent information has been shared with the patient in the After Visit Summary.    Diagnoses and all orders for this visit:    1. Primary hypertension (Primary)  Assessment & Plan:  1.  Continue Atenolol and Hyzaar as prescribed  2.  Increase clonidine to 0.2 mg twice daily  3.  Refer to cardiology    Orders:  -     Ambulatory Referral to Cardiology    2. Chronic obstructive pulmonary disease, unspecified COPD type (HCC)  Assessment & Plan:  1.  Encouraged smoking cessation  2.  Albuterol as needed        3. Chest pain, unspecified type  Assessment & Plan:  1.  Refer to cardiology for further evaluation    Orders:  -     Ambulatory Referral to Cardiology    4. DDD (degenerative disc disease), lumbar  Assessment & Plan:  1.  Urine drug screen  2.  Restart Percocet 10/325mg 3 times daily as needed for severe pain  3.  Patient to schedule appointment with pain management    Orders:  -     Urine Drug Screen - Urine, Clean Catch  -     oxyCODONE-acetaminophen (PERCOCET)  MG per tablet; Take 1 tablet by mouth Every 8 (Eight) Hours As Needed for Moderate Pain .  Dispense: 90 tablet; Refill: 0    5. Encounter for observation for other suspected diseases and conditions ruled out   -     Urine Drug Screen - Urine, Clean Catch    6. Pain of upper abdomen  Assessment & Plan:  1.  Pain at site of palpation but no bulge noted indicating a hernia.  There is a possibility of hiatal hernia.  I recommended she ensure that she is taking Dexilant as prescribed.  Add probiotic.  If pain persists, will refer to GI for  EGD.      7. Cognitive impairment  Assessment & Plan:  1.  MMSE is 17/30  2.  Reviewed results with patient  3.  Start Namenda 5 mg daily      Other orders  -     cloNIDine (Catapres) 0.2 MG tablet; Take 1 tablet by mouth 2 (Two) Times a Day for 30 days.  Dispense: 60 tablet; Refill: 1  -     memantine (Namenda) 5 MG tablet; Take 1 tablet by mouth Daily.  Dispense: 30 tablet; Refill: 1      Follow Up:  Return in about 2 months (around 9/27/2022) for Abdominal Pain/HTN.     An After Visit Summary and PPPS were made available to the patient.        I spent 30 minutes caring for Mona on this date of service. This time includes time spent by me in the following activities:preparing for the visit, reviewing tests, obtaining and/or reviewing a separately obtained history, performing a medically appropriate examination and/or evaluation , counseling and educating the patient/family/caregiver, ordering medications, tests, or procedures, referring and communicating with other health care professionals , documenting information in the medical record, independently interpreting results and communicating that information with the patient/family/caregiver and care coordination

## 2022-07-27 NOTE — ASSESSMENT & PLAN NOTE
1.  Urine drug screen  2.  Restart Percocet 10/325mg 3 times daily as needed for severe pain  3.  Patient to schedule appointment with pain management

## 2022-07-28 LAB
AMPHET+METHAMPHET UR QL: NEGATIVE
BARBITURATES UR QL SCN: NEGATIVE
BENZODIAZ UR QL SCN: NEGATIVE
CANNABINOIDS SERPL QL: POSITIVE
COCAINE UR QL: NEGATIVE
METHADONE UR QL SCN: NEGATIVE
OPIATES UR QL: NEGATIVE
OXYCODONE UR QL SCN: NEGATIVE

## 2022-08-20 RX ORDER — MELOXICAM 7.5 MG/1
7.5 TABLET ORAL DAILY
Qty: 30 TABLET | Refills: 1 | Status: CANCELLED | OUTPATIENT
Start: 2022-08-20

## 2022-08-22 RX ORDER — ATENOLOL 100 MG/1
TABLET ORAL
Qty: 90 TABLET | Refills: 0 | Status: SHIPPED | OUTPATIENT
Start: 2022-08-22 | End: 2022-11-23

## 2022-08-22 RX ORDER — MELOXICAM 7.5 MG/1
7.5 TABLET ORAL DAILY
Qty: 30 TABLET | Refills: 1 | Status: SHIPPED | OUTPATIENT
Start: 2022-08-22 | End: 2022-10-24

## 2022-08-29 NOTE — TELEPHONE ENCOUNTER
Caller: BozenamoisesMona    Relationship: Self    Best call back number:[phone]     Requested Prescriptions:   Requested Prescriptions     Pending Prescriptions Disp Refills   • losartan-hydrochlorothiazide (HYZAAR) 100-25 MG per tablet       Sig: Take 1 tablet by mouth Daily.   • Lidocaine, Anorectal, (LMX 5) 5 % cream cream          Pharmacy where request should be sent: [id number] [place] [id number] AT [place] [address] - [phone] [facility] [phone] [facility]     Additional details provided by patient:    Does the patient have less than a 3 day supply:  [x] Yes  [] No    Farrah Mobley Rep   08/29/22 14:52 EDT

## 2022-08-30 RX ORDER — LOSARTAN POTASSIUM AND HYDROCHLOROTHIAZIDE 25; 100 MG/1; MG/1
1 TABLET ORAL DAILY
Qty: 90 TABLET | Refills: 0 | Status: SHIPPED | OUTPATIENT
Start: 2022-08-30 | End: 2022-11-28

## 2022-08-30 RX ORDER — LIDOCAINE 5 G/100G
CREAM RECTAL; TOPICAL
Qty: 60 G | Refills: 1 | Status: SHIPPED | OUTPATIENT
Start: 2022-08-30

## 2022-09-27 ENCOUNTER — OFFICE VISIT (OUTPATIENT)
Dept: FAMILY MEDICINE CLINIC | Facility: CLINIC | Age: 67
End: 2022-09-27

## 2022-09-27 VITALS
WEIGHT: 172 LBS | HEIGHT: 64 IN | OXYGEN SATURATION: 98 % | HEART RATE: 47 BPM | SYSTOLIC BLOOD PRESSURE: 142 MMHG | DIASTOLIC BLOOD PRESSURE: 90 MMHG | BODY MASS INDEX: 29.37 KG/M2

## 2022-09-27 DIAGNOSIS — R10.10 PAIN OF UPPER ABDOMEN: ICD-10-CM

## 2022-09-27 DIAGNOSIS — F41.9 ANXIETY AND DEPRESSION: ICD-10-CM

## 2022-09-27 DIAGNOSIS — M51.36 DDD (DEGENERATIVE DISC DISEASE), LUMBAR: ICD-10-CM

## 2022-09-27 DIAGNOSIS — L25.9 CONTACT DERMATITIS, UNSPECIFIED CONTACT DERMATITIS TYPE, UNSPECIFIED TRIGGER: ICD-10-CM

## 2022-09-27 DIAGNOSIS — F32.A ANXIETY AND DEPRESSION: ICD-10-CM

## 2022-09-27 DIAGNOSIS — K21.9 GASTROESOPHAGEAL REFLUX DISEASE, UNSPECIFIED WHETHER ESOPHAGITIS PRESENT: ICD-10-CM

## 2022-09-27 DIAGNOSIS — R41.89 COGNITIVE IMPAIRMENT: ICD-10-CM

## 2022-09-27 DIAGNOSIS — I10 PRIMARY HYPERTENSION: Primary | ICD-10-CM

## 2022-09-27 PROCEDURE — 90677 PCV20 VACCINE IM: CPT | Performed by: NURSE PRACTITIONER

## 2022-09-27 PROCEDURE — G0009 ADMIN PNEUMOCOCCAL VACCINE: HCPCS | Performed by: NURSE PRACTITIONER

## 2022-09-27 PROCEDURE — 90662 IIV NO PRSV INCREASED AG IM: CPT | Performed by: NURSE PRACTITIONER

## 2022-09-27 PROCEDURE — G0008 ADMIN INFLUENZA VIRUS VAC: HCPCS | Performed by: NURSE PRACTITIONER

## 2022-09-27 PROCEDURE — 99214 OFFICE O/P EST MOD 30 MIN: CPT | Performed by: NURSE PRACTITIONER

## 2022-09-27 RX ORDER — TRIAMCINOLONE ACETONIDE 1 MG/G
1 CREAM TOPICAL 2 TIMES DAILY
Qty: 60 G | Refills: 1 | Status: SHIPPED | OUTPATIENT
Start: 2022-09-27 | End: 2022-11-08 | Stop reason: SDUPTHER

## 2022-09-27 RX ORDER — BUPROPION HYDROCHLORIDE 150 MG/1
150 TABLET ORAL DAILY
Qty: 30 TABLET | Refills: 1 | Status: SHIPPED | OUTPATIENT
Start: 2022-09-27 | End: 2022-11-22

## 2022-09-27 NOTE — ASSESSMENT & PLAN NOTE
1.  Improving  2.  Continue medications as prescribed  3.  Advised patient to call cardiology to reschedule appointment

## 2022-09-27 NOTE — PROGRESS NOTES
Chief Complaint  Follow-up (2 month follow up abdominal pain/HTN. Pt also stated that she is having trouble sleeping and was seeing things when she first started taking clonidine.)    Subjective        Mona Sheffield presents to [facility]  History of Present Illness    Patient presents for follow-up visit.    Patient seen on 7/29 with elevated blood pressure on Atenolol, Hyzaar and 0.1 mg of clonidine.  Clonidine was increased to 0.2 mg twice daily and patient is taking as prescribed.  Blood pressure is improving but remains slightly elevated. She was also referred to cardiology.  Patient was scheduled twice with cardiology and canceled both appointments.    Patient had also complained of left lower quadrant pain, sharp in nature that was intermittent.  She had reported a bulge when pain occurs.  Upon assessment, pain was noted to be in the upper midline abdominal region and immediately distal to the umbilicus.  Patient had reported history of ovarian cyst but has since had a hysterectomy.  She is taking it as prescribed for GERD. Patient reports pain has improved, has no acute complaints today.     Namenda was started at 5 mg daily due to MMSE of 17. She is tolerating well without complications.     Patient has chronic back pain related to lumbar DDD.  She is taking Percocet as needed for pain and was referred to pain management. Patient will see Pain Management on Thursday.     Patient presents with dry patches to bilateral elbows and right ankle. She reports accompanying itching. Patient applying lotion without relief.     Patient is taking Zoloft 150mg daily for depression. She reports intermittent feelings of lacking motivation, depression and wanting to stay away from people. Patient has been going through her late mother's belongings as they prepare the house. She finds her self just sitting and staring.     Objective   Vital Signs:  /90 (BP Location: Left arm, Patient  "Position: Sitting, Cuff Size: Adult)   Pulse (!) 47   Ht 162.6 cm (64\")   Wt 78 kg (172 lb)   SpO2 98%   BMI 29.52 kg/m²   Estimated body mass index is 29.52 kg/m² as calculated from the following:    Height as of this encounter: 162.6 cm (64\").    Weight as of this encounter: 78 kg (172 lb).    BMI is >= 25 and <30. (Overweight) The following options were offered after discussion;: exercise counseling/recommendations      Physical Exam  Constitutional:       Appearance: Normal appearance.   HENT:      Head: Normocephalic.   Cardiovascular:      Rate and Rhythm: Normal rate and regular rhythm.   Pulmonary:      Effort: Pulmonary effort is normal.      Breath sounds: Normal breath sounds.   Abdominal:      General: Abdomen is flat. Bowel sounds are normal.      Palpations: Abdomen is soft.   Musculoskeletal:         General: Normal range of motion.      Cervical back: Neck supple.      Right lower leg: No edema.      Left lower leg: No edema.   Skin:     General: Skin is warm and dry.          Neurological:      Mental Status: She is alert and oriented to person, place, and time.      Gait: Gait is intact.   Psychiatric:         Attention and Perception: Attention normal.         Mood and Affect: Mood normal.         Speech: Speech normal.        Result Review :{Labs  Result Review  Imaging  Med Tab  Media  Procedures  :23}    CMP    CMP 4/5/22   Glucose 84   BUN 24 (A)   Creatinine 0.90   Sodium 139   Potassium 4.7   Chloride 100   Calcium 9.9   Albumin 4.40   Total Bilirubin 0.2   Alkaline Phosphatase 54   AST (SGOT) 24   ALT (SGPT) 23   (A) Abnormal value            CBC    CBC 4/5/22   WBC 7.18   RBC 4.80   Hemoglobin 14.0   Hematocrit 42.7   MCV 89.0   MCH 29.2   MCHC 32.8   RDW 13.4   Platelets 158           Lipid Panel    Lipid Panel 4/5/22   Total Cholesterol 328 (A)   Triglycerides 339 (A)   HDL Cholesterol 36 (A)   VLDL Cholesterol 71 (A)   LDL Cholesterol  221 (A)   LDL/HDL Ratio 6.23   (A) " Abnormal value            TSH    TSH 4/5/22   TSH 0.798                         Assessment and Plan   Diagnoses and all orders for this visit:    1. Primary hypertension (Primary)  Assessment & Plan:  1.  Improving  2.  Continue medications as prescribed  3.  Advised patient to call cardiology to reschedule appointment      2. Gastroesophageal reflux disease, unspecified whether esophagitis present  Assessment & Plan:  1.  Continue Dexilant as prescribed      3. Pain of upper abdomen  Assessment & Plan:  1.  Improved, patient to call with acute complaints      4. DDD (degenerative disc disease), lumbar  Assessment & Plan:  1.  Follow-up with pain management      5. Cognitive impairment  Assessment & Plan:  1.  Continue Namenda 5 mg daily      6. Contact dermatitis, unspecified contact dermatitis type, unspecified trigger  Assessment & Plan:  1.  Triamcinolone cream to areas twice daily as needed      7. Anxiety and depression  Assessment & Plan:  1.  Continue Zoloft 150 mg daily  2.  Add Wellbutrin 150 mg daily      Other orders  -     triamcinolone (KENALOG) 0.1 % cream; Apply 1 application topically to the appropriate area as directed 2 (Two) Times a Day.  Dispense: 60 g; Refill: 1  -     Fluzone High-Dose 65+yrs (0820-2508)  -     Pneumococcal Conjugate Vaccine 20-Valent (PCV20)  -     buPROPion XL (Wellbutrin XL) 150 MG 24 hr tablet; Take 1 tablet by mouth Daily.  Dispense: 30 tablet; Refill: 1         I spent 30 minutes caring for Mona on this date of service. This time includes time spent by me in the following activities:preparing for the visit, reviewing tests, obtaining and/or reviewing a separately obtained history, performing a medically appropriate examination and/or evaluation , counseling and educating the patient/family/caregiver, ordering medications, tests, or procedures, documenting information in the medical record, independently interpreting results and communicating that information with the  patient/family/caregiver and care coordination  Follow Up   Return in about 6 weeks (around 11/8/2022) for Depression.  Patient was given instructions and counseling regarding her condition or for health maintenance advice. Please see specific information pulled into the AVS if appropriate.

## 2022-09-29 RX ORDER — SERTRALINE HYDROCHLORIDE 100 MG/1
TABLET, FILM COATED ORAL
Qty: 45 TABLET | Refills: 1 | Status: SHIPPED | OUTPATIENT
Start: 2022-09-29 | End: 2022-12-05

## 2022-10-05 RX ORDER — BACLOFEN 10 MG/1
10 TABLET ORAL NIGHTLY
Qty: 30 TABLET | Refills: 1 | Status: SHIPPED | OUTPATIENT
Start: 2022-10-05 | End: 2022-12-20

## 2022-10-24 RX ORDER — MELOXICAM 7.5 MG/1
7.5 TABLET ORAL DAILY
Qty: 30 TABLET | Refills: 1 | Status: SHIPPED | OUTPATIENT
Start: 2022-10-24 | End: 2022-11-08 | Stop reason: SDUPTHER

## 2022-10-25 RX ORDER — ATORVASTATIN CALCIUM 20 MG/1
20 TABLET, FILM COATED ORAL DAILY
Qty: 90 TABLET | Refills: 0 | Status: SHIPPED | OUTPATIENT
Start: 2022-10-25 | End: 2023-01-23

## 2022-11-08 ENCOUNTER — OFFICE VISIT (OUTPATIENT)
Dept: FAMILY MEDICINE CLINIC | Facility: CLINIC | Age: 67
End: 2022-11-08

## 2022-11-08 VITALS
HEART RATE: 52 BPM | DIASTOLIC BLOOD PRESSURE: 90 MMHG | HEIGHT: 64 IN | OXYGEN SATURATION: 98 % | WEIGHT: 175 LBS | SYSTOLIC BLOOD PRESSURE: 168 MMHG | BODY MASS INDEX: 29.88 KG/M2

## 2022-11-08 DIAGNOSIS — M25.50 ARTHRALGIA, UNSPECIFIED JOINT: ICD-10-CM

## 2022-11-08 DIAGNOSIS — R40.0 DAYTIME SOMNOLENCE: ICD-10-CM

## 2022-11-08 DIAGNOSIS — J44.9 CHRONIC OBSTRUCTIVE PULMONARY DISEASE, UNSPECIFIED COPD TYPE: ICD-10-CM

## 2022-11-08 DIAGNOSIS — I10 PRIMARY HYPERTENSION: ICD-10-CM

## 2022-11-08 DIAGNOSIS — M51.36 DDD (DEGENERATIVE DISC DISEASE), LUMBAR: ICD-10-CM

## 2022-11-08 DIAGNOSIS — G47.10 HYPERSOMNIA, UNSPECIFIED: ICD-10-CM

## 2022-11-08 DIAGNOSIS — F32.A ANXIETY AND DEPRESSION: ICD-10-CM

## 2022-11-08 DIAGNOSIS — F41.9 ANXIETY AND DEPRESSION: ICD-10-CM

## 2022-11-08 DIAGNOSIS — R06.83 SNORING: Primary | ICD-10-CM

## 2022-11-08 PROCEDURE — 99214 OFFICE O/P EST MOD 30 MIN: CPT | Performed by: NURSE PRACTITIONER

## 2022-11-08 RX ORDER — OXYCODONE AND ACETAMINOPHEN 10; 325 MG/1; MG/1
1 TABLET ORAL EVERY 8 HOURS PRN
Qty: 90 TABLET | Refills: 0 | Status: SHIPPED | OUTPATIENT
Start: 2022-11-08

## 2022-11-08 RX ORDER — HYDRALAZINE HYDROCHLORIDE 25 MG/1
25 TABLET, FILM COATED ORAL 2 TIMES DAILY
Qty: 60 TABLET | Refills: 1 | Status: SHIPPED | OUTPATIENT
Start: 2022-11-08 | End: 2022-11-08

## 2022-11-08 RX ORDER — HYDRALAZINE HYDROCHLORIDE 25 MG/1
TABLET, FILM COATED ORAL
Qty: 180 TABLET | Refills: 0 | Status: SHIPPED | OUTPATIENT
Start: 2022-11-08 | End: 2022-12-08

## 2022-11-08 RX ORDER — TRIAMCINOLONE ACETONIDE 1 MG/G
1 CREAM TOPICAL 2 TIMES DAILY
Qty: 60 G | Refills: 1 | Status: SHIPPED | OUTPATIENT
Start: 2022-11-08

## 2022-11-08 RX ORDER — MELOXICAM 15 MG/1
15 TABLET ORAL DAILY
Qty: 30 TABLET | Refills: 1 | Status: SHIPPED | OUTPATIENT
Start: 2022-11-08 | End: 2023-01-09

## 2022-11-08 NOTE — ASSESSMENT & PLAN NOTE
1.  Trial Breztri 2 puffs twice daily, patient to call with update in 1 week  2.  Albuterol as needed for dyspnea or wheezing

## 2022-11-08 NOTE — PROGRESS NOTES
"Chief Complaint  Follow-up (6 weeks follow up depression/HTN ) and Arthritis (Increase in arthritis pain/a lot of joint pain )    Subjective        Mona Sheffield presents to [facility]  History of Present Illness    Patient presents for follow-up visit.    Patient seen on 7/29 with elevated blood pressure on Atenolol, Hyzaar and 0.2 mg of clonidine BID.   Blood pressure is elevated today. She has intermittent headaches without dizziness or chest pain.  Patient also referred to Cardiology, appointment not scheduled.    Patient has chronic back pain related to lumbar DDD.  She is taking Percocet as needed for pain and was referred to pain management.  Patient reports they are going to adjust her pain stimulator.  She also c/o of generalized joint pain. Patient is prescribed Mobic 7.5mg daily.     Patient also previously complained of lacking motivation, increased depression and wanting to stay away from people.  He is taking Zoloft 150 mg daily and Wellbutrin was added to her regimen at previous visit. She denies any change in symptoms since starting the Wellbutrin. Patient reports her mood feels fine until she wears down or fatigues in the afternoon.  She also reports easily fatiguing, feeling sleepy most of the day and the symptoms wake up choking.  Patient does have COPD, using albuterol as needed.    Objective   Vital Signs:  /90 (BP Location: Left arm, Patient Position: Sitting, Cuff Size: Adult)   Pulse 52   Ht 162.6 cm (64\")   Wt 79.4 kg (175 lb)   SpO2 98%   BMI 30.04 kg/m²   Estimated body mass index is 30.04 kg/m² as calculated from the following:    Height as of this encounter: 162.6 cm (64\").    Weight as of this encounter: 79.4 kg (175 lb).          Physical Exam  Constitutional:       Appearance: Normal appearance.   HENT:      Head: Normocephalic.   Cardiovascular:      Rate and Rhythm: Normal rate and regular rhythm.   Pulmonary:      Effort: Pulmonary effort " is normal.      Breath sounds: Normal breath sounds.   Abdominal:      General: Abdomen is flat. Bowel sounds are normal.      Palpations: Abdomen is soft.   Musculoskeletal:         General: Normal range of motion.      Cervical back: Neck supple.      Right lower leg: No edema.      Left lower leg: No edema.   Skin:     General: Skin is warm and dry.   Neurological:      Mental Status: She is alert and oriented to person, place, and time.      Gait: Gait is intact.   Psychiatric:         Attention and Perception: Attention normal.         Mood and Affect: Mood normal.         Speech: Speech normal.        Result Review :    CMP    CMP 4/5/22   Glucose 84   BUN 24 (A)   Creatinine 0.90   Sodium 139   Potassium 4.7   Chloride 100   Calcium 9.9   Albumin 4.40   Total Bilirubin 0.2   Alkaline Phosphatase 54   AST (SGOT) 24   ALT (SGPT) 23   (A) Abnormal value            CBC    CBC 4/5/22   WBC 7.18   RBC 4.80   Hemoglobin 14.0   Hematocrit 42.7   MCV 89.0   MCH 29.2   MCHC 32.8   RDW 13.4   Platelets 158           Lipid Panel    Lipid Panel 4/5/22   Total Cholesterol 328 (A)   Triglycerides 339 (A)   HDL Cholesterol 36 (A)   VLDL Cholesterol 71 (A)   LDL Cholesterol  221 (A)   LDL/HDL Ratio 6.23   (A) Abnormal value            TSH    TSH 4/5/22   TSH 0.798                     Assessment and Plan   Diagnoses and all orders for this visit:    1. Snoring (Primary)  -     Home Sleep Study; Future    2. DDD (degenerative disc disease), lumbar  Assessment & Plan:  1.  Follow-up with pain management  2.  Percocet as needed for pain, patient to discuss with pain management taking over prescription    Orders:  -     oxyCODONE-acetaminophen (PERCOCET)  MG per tablet; Take 1 tablet by mouth Every 8 (Eight) Hours As Needed for Moderate Pain.  Dispense: 90 tablet; Refill: 0    3. Daytime somnolence  Assessment & Plan:  1.  Home sleep study ordered    Orders:  -     Home Sleep Study; Future    4. Primary  hypertension  Assessment & Plan:  1.  BP not at goal of 140/90  2.  Continue current medications and add hydralazine 25 mg twice daily  3.  Patient strongly advised to schedule appointment with cardiology    Orders:  -     Home Sleep Study; Future    5. Hypersomnia, unspecified  -     Home Sleep Study; Future    6. Arthralgia, unspecified joint  Assessment & Plan:  1.  Increase Mobic to 15 mg daily      7. Anxiety and depression  Assessment & Plan:  1.  We will continue Zoloft and Wellbutrin as prescribed.  Discussed with patient that I would recommend we stabilize blood pressure, stabilize pain, treat symptoms of COPD and rule out sleep apnea before we make further adjustments to antidepressants.      8. Chronic obstructive pulmonary disease, unspecified COPD type (HCC)  Assessment & Plan:  1.  Trial Breztri 2 puffs twice daily, patient to call with update in 1 week  2.  Albuterol as needed for dyspnea or wheezing          Other orders  -     hydrALAZINE (APRESOLINE) 25 MG tablet; Take 1 tablet by mouth 2 (Two) Times a Day for 30 days.  Dispense: 60 tablet; Refill: 1  -     meloxicam (MOBIC) 15 MG tablet; Take 1 tablet by mouth Daily for 30 days.  Dispense: 30 tablet; Refill: 1  -     triamcinolone (KENALOG) 0.1 % cream; Apply 1 application topically to the appropriate area as directed 2 (Two) Times a Day.  Dispense: 60 g; Refill: 1         I spent 30 minutes caring for Mona on this date of service. This time includes time spent by me in the following activities:preparing for the visit, reviewing tests, obtaining and/or reviewing a separately obtained history, performing a medically appropriate examination and/or evaluation , counseling and educating the patient/family/caregiver, ordering medications, tests, or procedures, documenting information in the medical record, independently interpreting results and communicating that information with the patient/family/caregiver and care coordination  Follow Up   Return  in about 6 weeks (around 12/20/2022) for HTN/COPD/Depression.  Patient was given instructions and counseling regarding her condition or for health maintenance advice. Please see specific information pulled into the AVS if appropriate.

## 2022-11-08 NOTE — ASSESSMENT & PLAN NOTE
1.  Follow-up with pain management  2.  Percocet as needed for pain, patient to discuss with pain management taking over prescription

## 2022-11-08 NOTE — ASSESSMENT & PLAN NOTE
1.  We will continue Zoloft and Wellbutrin as prescribed.  Discussed with patient that I would recommend we stabilize blood pressure, stabilize pain, treat symptoms of COPD and rule out sleep apnea before we make further adjustments to antidepressants.

## 2022-11-08 NOTE — PATIENT INSTRUCTIONS
Increase Meloxicam to 15mg daily (joint pain)  Refill Percocet, discuss with pain management on refills  Add Hydralazine twice daily for blood pressure  Follow up with Dr. Roa  Sleep study ordered, they will mail to home  Trial Breztri 2 puffs twice daily - continue using albuterol inhaler as needed

## 2022-11-22 RX ORDER — BUPROPION HYDROCHLORIDE 150 MG/1
150 TABLET ORAL DAILY
Qty: 30 TABLET | Refills: 1 | Status: SHIPPED | OUTPATIENT
Start: 2022-11-22 | End: 2023-01-24

## 2022-11-23 RX ORDER — ATENOLOL 100 MG/1
TABLET ORAL
Qty: 90 TABLET | Refills: 0 | Status: SHIPPED | OUTPATIENT
Start: 2022-11-23 | End: 2023-02-20

## 2022-11-28 RX ORDER — LOSARTAN POTASSIUM AND HYDROCHLOROTHIAZIDE 25; 100 MG/1; MG/1
1 TABLET ORAL DAILY
Qty: 90 TABLET | Refills: 0 | Status: SHIPPED | OUTPATIENT
Start: 2022-11-28 | End: 2023-03-08

## 2022-12-05 RX ORDER — SERTRALINE HYDROCHLORIDE 100 MG/1
TABLET, FILM COATED ORAL
Qty: 45 TABLET | Refills: 1 | Status: SHIPPED | OUTPATIENT
Start: 2022-12-05 | End: 2023-02-06

## 2022-12-08 RX ORDER — HYDRALAZINE HYDROCHLORIDE 25 MG/1
TABLET, FILM COATED ORAL
Qty: 180 TABLET | Refills: 0 | Status: SHIPPED | OUTPATIENT
Start: 2022-12-08 | End: 2023-02-14

## 2022-12-20 RX ORDER — MELOXICAM 7.5 MG/1
7.5 TABLET ORAL DAILY
Qty: 30 TABLET | Refills: 1 | OUTPATIENT
Start: 2022-12-20

## 2022-12-20 RX ORDER — BACLOFEN 10 MG/1
10 TABLET ORAL NIGHTLY
Qty: 30 TABLET | Refills: 1 | Status: SHIPPED | OUTPATIENT
Start: 2022-12-20 | End: 2023-03-10

## 2022-12-23 ENCOUNTER — TELEPHONE (OUTPATIENT)
Dept: FAMILY MEDICINE CLINIC | Facility: CLINIC | Age: 67
End: 2022-12-23

## 2022-12-23 NOTE — TELEPHONE ENCOUNTER
Called and left detailed message regarding mammogram order. Inquired if had performed. Instructed to call back to office and let us know or if no longer wishes to have performed..

## 2022-12-27 ENCOUNTER — TELEPHONE (OUTPATIENT)
Dept: FAMILY MEDICINE CLINIC | Facility: CLINIC | Age: 67
End: 2022-12-27

## 2022-12-27 NOTE — TELEPHONE ENCOUNTER
James [facility] called in regarding HST that was ordered, pt insurance is not in network, [employer] offered cash price of $250.00 with no response from pt.  Called pt to discuss above conversation, no answer: per verbal left the detailed msg and for pt to call office

## 2023-01-09 RX ORDER — MELOXICAM 15 MG/1
TABLET ORAL
Qty: 30 TABLET | Refills: 1 | Status: SHIPPED | OUTPATIENT
Start: 2023-01-09 | End: 2023-02-14

## 2023-01-13 NOTE — TELEPHONE ENCOUNTER
PAIN  TOLD PATIENT TO D/C MELOXICAM -CATHY    SHE IS ALSO NEEDING ORDERS FOR MAMMOGRAM       Caller:     Mona Sheffield (Self) [phone] (Mobile)     Requested Prescriptions:      hydrOXYzine pamoate (VISTARIL) 25 MG capsule   0 ordered  EditCancel Reorder       Summary: TAKE 1 CAPSULE BY MOUTH THREE TIMES DAILY AS NEEDED FOR ANXIETY          Pharmacy where request should be sent: [facility] #[id number] [address] [address] AT [address] [address] - [phone]  - [phone] FX     Additional details provided by patient:     Does the patient have less than a 3 day supply:  [x] Yes  [] No    Would you like a call back once the refill request has been completed: [x] Yes [] No    If the office needs to give you a call back, can they leave a voicemail: [] Yes [x] No    Farrah Granger Rep   01/13/23 14:59 EST

## 2023-01-15 RX ORDER — HYDROXYZINE PAMOATE 25 MG/1
25 CAPSULE ORAL 3 TIMES DAILY PRN
Qty: 30 CAPSULE | Refills: 0 | Status: SHIPPED | OUTPATIENT
Start: 2023-01-15

## 2023-01-18 ENCOUNTER — TELEPHONE (OUTPATIENT)
Dept: FAMILY MEDICINE CLINIC | Facility: CLINIC | Age: 68
End: 2023-01-18
Payer: MEDICARE

## 2023-01-18 NOTE — TELEPHONE ENCOUNTER
Pt called in with concern of coughing and wheezing, possible bronchitis that pt gets every year around this time. Pt states symptoms started after the weekend.  Pt hasn't taken anything OTC due to high blood pressure.  Pt was wanting same day appt, informed that provider was out of office and schedule is full next day.  Please advise

## 2023-01-18 NOTE — TELEPHONE ENCOUNTER
Attempted to call pt with providers response, no answer: per verbal left msg asking pt to call as provider can see her we just need to see if time will work for pt

## 2023-01-19 RX ORDER — AZITHROMYCIN 250 MG/1
TABLET, FILM COATED ORAL
Qty: 6 TABLET | Refills: 0 | Status: SHIPPED | OUTPATIENT
Start: 2023-01-19 | End: 2023-02-14

## 2023-01-19 RX ORDER — BENZONATATE 100 MG/1
100 CAPSULE ORAL 3 TIMES DAILY PRN
Qty: 30 CAPSULE | Refills: 0 | Status: SHIPPED | OUTPATIENT
Start: 2023-01-19 | End: 2023-02-14

## 2023-01-19 RX ORDER — METHYLPREDNISOLONE 4 MG/1
TABLET ORAL
Qty: 21 TABLET | Refills: 0 | Status: SHIPPED | OUTPATIENT
Start: 2023-01-19 | End: 2023-02-14

## 2023-01-19 NOTE — TELEPHONE ENCOUNTER
Attempted to call pt regarding providers response with appt time, no answer: meg mohr left msg for pt to call office if wanting to schedule at that appt time

## 2023-01-19 NOTE — TELEPHONE ENCOUNTER
Patient call back to Office at 10:38am and can not make it in time for appt window. She did rec the cough medication that was sent.

## 2023-01-19 NOTE — TELEPHONE ENCOUNTER
Since schedule is full, I did go ahead and send in antibiotics as well as a cough suppressant.  I am also adding a steroid pack for her to take due to increased wheezing.  Continue breathing treatments as needed.  If symptoms persist or worsen, she should call back

## 2023-01-23 RX ORDER — ATORVASTATIN CALCIUM 20 MG/1
20 TABLET, FILM COATED ORAL DAILY
Qty: 90 TABLET | Refills: 0 | Status: SHIPPED | OUTPATIENT
Start: 2023-01-23 | End: 2023-02-15

## 2023-01-23 RX ORDER — MEMANTINE HYDROCHLORIDE 5 MG/1
5 TABLET ORAL DAILY
Qty: 90 TABLET | Refills: 1 | Status: SHIPPED | OUTPATIENT
Start: 2023-01-23

## 2023-01-23 RX ORDER — CLONIDINE HYDROCHLORIDE 0.2 MG/1
TABLET ORAL
Qty: 180 TABLET | Refills: 1 | Status: SHIPPED | OUTPATIENT
Start: 2023-01-23

## 2023-01-24 RX ORDER — BUPROPION HYDROCHLORIDE 150 MG/1
150 TABLET ORAL DAILY
Qty: 30 TABLET | Refills: 1 | Status: SHIPPED | OUTPATIENT
Start: 2023-01-24

## 2023-02-06 RX ORDER — SERTRALINE HYDROCHLORIDE 100 MG/1
TABLET, FILM COATED ORAL
Qty: 45 TABLET | Refills: 1 | Status: SHIPPED | OUTPATIENT
Start: 2023-02-06

## 2023-02-14 ENCOUNTER — OFFICE VISIT (OUTPATIENT)
Dept: FAMILY MEDICINE CLINIC | Facility: CLINIC | Age: 68
End: 2023-02-14
Payer: MEDICARE

## 2023-02-14 VITALS
SYSTOLIC BLOOD PRESSURE: 148 MMHG | BODY MASS INDEX: 30.73 KG/M2 | HEART RATE: 61 BPM | OXYGEN SATURATION: 96 % | DIASTOLIC BLOOD PRESSURE: 98 MMHG | HEIGHT: 64 IN | WEIGHT: 180 LBS

## 2023-02-14 DIAGNOSIS — M51.36 DDD (DEGENERATIVE DISC DISEASE), LUMBAR: Primary | ICD-10-CM

## 2023-02-14 DIAGNOSIS — Z00.00 PREVENTATIVE HEALTH CARE: ICD-10-CM

## 2023-02-14 DIAGNOSIS — F32.A ANXIETY AND DEPRESSION: ICD-10-CM

## 2023-02-14 DIAGNOSIS — Z12.31 SCREENING MAMMOGRAM FOR BREAST CANCER: ICD-10-CM

## 2023-02-14 DIAGNOSIS — R40.0 DAYTIME SOMNOLENCE: ICD-10-CM

## 2023-02-14 DIAGNOSIS — I10 PRIMARY HYPERTENSION: ICD-10-CM

## 2023-02-14 DIAGNOSIS — G47.10 HYPERSOMNIA, UNSPECIFIED: ICD-10-CM

## 2023-02-14 DIAGNOSIS — G47.00 INSOMNIA, UNSPECIFIED TYPE: ICD-10-CM

## 2023-02-14 DIAGNOSIS — E55.9 VITAMIN D DEFICIENCY: ICD-10-CM

## 2023-02-14 DIAGNOSIS — F41.9 ANXIETY AND DEPRESSION: ICD-10-CM

## 2023-02-14 DIAGNOSIS — R06.83 SNORING: ICD-10-CM

## 2023-02-14 DIAGNOSIS — J44.9 CHRONIC OBSTRUCTIVE PULMONARY DISEASE, UNSPECIFIED COPD TYPE: ICD-10-CM

## 2023-02-14 LAB — HBA1C MFR BLD: 6 % (ref 3.5–5.6)

## 2023-02-14 PROCEDURE — 84443 ASSAY THYROID STIM HORMONE: CPT | Performed by: NURSE PRACTITIONER

## 2023-02-14 PROCEDURE — 99214 OFFICE O/P EST MOD 30 MIN: CPT | Performed by: NURSE PRACTITIONER

## 2023-02-14 PROCEDURE — 83036 HEMOGLOBIN GLYCOSYLATED A1C: CPT | Performed by: NURSE PRACTITIONER

## 2023-02-14 PROCEDURE — 80053 COMPREHEN METABOLIC PANEL: CPT | Performed by: NURSE PRACTITIONER

## 2023-02-14 PROCEDURE — 85027 COMPLETE CBC AUTOMATED: CPT | Performed by: NURSE PRACTITIONER

## 2023-02-14 PROCEDURE — 82306 VITAMIN D 25 HYDROXY: CPT | Performed by: NURSE PRACTITIONER

## 2023-02-14 PROCEDURE — 82607 VITAMIN B-12: CPT | Performed by: NURSE PRACTITIONER

## 2023-02-14 PROCEDURE — 36415 COLL VENOUS BLD VENIPUNCTURE: CPT | Performed by: NURSE PRACTITIONER

## 2023-02-14 PROCEDURE — 80061 LIPID PANEL: CPT | Performed by: NURSE PRACTITIONER

## 2023-02-14 RX ORDER — BUDESONIDE, GLYCOPYRROLATE, AND FORMOTEROL FUMARATE 160; 9; 4.8 UG/1; UG/1; UG/1
2 AEROSOL, METERED RESPIRATORY (INHALATION) 2 TIMES DAILY
Qty: 8 G | Refills: 1 | Status: SHIPPED | OUTPATIENT
Start: 2023-02-14

## 2023-02-14 RX ORDER — MONTELUKAST SODIUM 10 MG/1
10 TABLET ORAL NIGHTLY
Qty: 30 TABLET | Refills: 1 | Status: SHIPPED | OUTPATIENT
Start: 2023-02-14

## 2023-02-14 RX ORDER — HYDRALAZINE HYDROCHLORIDE 25 MG/1
25 TABLET, FILM COATED ORAL 3 TIMES DAILY
Qty: 90 TABLET | Refills: 0 | Status: SHIPPED | OUTPATIENT
Start: 2023-02-14 | End: 2023-04-03

## 2023-02-14 RX ORDER — TRAZODONE HYDROCHLORIDE 50 MG/1
50 TABLET ORAL NIGHTLY
Qty: 30 TABLET | Refills: 1 | Status: SHIPPED | OUTPATIENT
Start: 2023-02-14

## 2023-02-14 NOTE — PATIENT INSTRUCTIONS
Stop Hydroxyzine and let me know if hallucinations stop  Reorder sleep study  Start Breztri 2 puffs BID (sent to pharmacy)  Call Cardiology and schedule appointment  Increase Hydralazine 25mg to three times daily  Start Trazodone 50 mg nightly for insomnia  Add Singulair nightly to help with nighttime congestion  
5-7 days

## 2023-02-14 NOTE — PROGRESS NOTES
"Chief Complaint  Follow-up (6 week follow up HTN/COPD/depression), Hallucinations (At night time ), and Dizziness (When she first wakes up in the morning )    Subjective        Mona Sheffield presents to [facility]  History of Present Illness    Patient presents for f/u visit.     Patient is having intermittent hallucinations, at nighttime only. She reports taking Hydroxyzine nightly for anxiety. Patient is also taking Zoloft 100 mg daily and Wellbutrin 150 mg daily for depression and anxiety.  She reports her sleep schedule is all over the place, often at most of the night and then sleeping throughout the day.  Sleep study ordered previously but was not covered by insurance at the time.    Patient has hypertension, taking Atenolol 100 mg daily, Hydralazine 25 mg BID, Hyzaar daily and Clonidine BID.  Blood pressure remains elevated.  Patient has intermittent dizziness and headaches.    Patient has chronic back pain related to lumbar DDD.  She is taking Percocet as needed for pain, followed by Pain Management.     Patent has COPD, using albuterol PRN dyspnea. Breztri tried at last visit and patient reports it made her feel better. Patient c/o increasing congestion, worse in the AM.       Objective   Vital Signs:  /98 (BP Location: Left arm, Patient Position: Sitting, Cuff Size: Adult)   Pulse 61   Ht 162.6 cm (64\")   Wt 81.6 kg (180 lb)   SpO2 96%   BMI 30.90 kg/m²   Estimated body mass index is 30.9 kg/m² as calculated from the following:    Height as of this encounter: 162.6 cm (64\").    Weight as of this encounter: 81.6 kg (180 lb).             Physical Exam  Constitutional:       Appearance: Normal appearance.   HENT:      Head: Normocephalic.   Cardiovascular:      Rate and Rhythm: Normal rate and regular rhythm.   Pulmonary:      Effort: Pulmonary effort is normal.      Breath sounds: Normal breath sounds.   Abdominal:      General: Abdomen is flat. Bowel sounds are " normal.      Palpations: Abdomen is soft.   Musculoskeletal:         General: Normal range of motion.      Cervical back: Neck supple.      Right lower leg: No edema.      Left lower leg: No edema.   Skin:     General: Skin is warm and dry.   Neurological:      Mental Status: She is alert and oriented to person, place, and time.      Gait: Gait is intact.   Psychiatric:         Attention and Perception: Attention normal.         Mood and Affect: Mood normal.         Speech: Speech normal.        Result Review :    CMP    CMP 4/5/22   Glucose 84   BUN 24 (A)   Creatinine 0.90   eGFR 71.1   Sodium 139   Potassium 4.7   Chloride 100   Calcium 9.9   Total Protein 7.2   Albumin 4.40   Globulin 2.8   Total Bilirubin 0.2   Alkaline Phosphatase 54   AST (SGOT) 24   ALT (SGPT) 23   Albumin/Globulin Ratio 1.6   BUN/Creatinine Ratio 26.7 (A)   Anion Gap 9.5   (A) Abnormal value       Comments are available for some flowsheets but are not being displayed.           CBC    CBC 4/5/22   WBC 7.18   RBC 4.80   Hemoglobin 14.0   Hematocrit 42.7   MCV 89.0   MCH 29.2   MCHC 32.8   RDW 13.4   Platelets 158           Lipid Panel    Lipid Panel 4/5/22   Total Cholesterol 328 (A)   Triglycerides 339 (A)   HDL Cholesterol 36 (A)   VLDL Cholesterol 71 (A)   LDL Cholesterol  221 (A)   LDL/HDL Ratio 6.23   (A) Abnormal value            TSH    TSH 4/5/22   TSH 0.798                            Assessment and Plan   Diagnoses and all orders for this visit:    1. DDD (degenerative disc disease), lumbar (Primary)  Assessment & Plan:  1.  Continue Percocet per pain management    Orders:  -     Home Sleep Study; Future    2. Snoring  -     Home Sleep Study; Future    3. Daytime somnolence  -     Home Sleep Study; Future  -     Vitamin B12    4. Primary hypertension  Assessment & Plan:  1.  Continue atenolol, Hyzaar and clonidine as prescribed  2.  Increase hydralazine 25 mg to 3 times daily  3.  Patient needs to call and schedule appointment with  cardiology    Orders:  -     Home Sleep Study; Future  -     CBC (No Diff)  -     Comprehensive Metabolic Panel  -     Lipid Panel  -     TSH    5. Chronic obstructive pulmonary disease, unspecified COPD type (HCC)  Assessment & Plan:  1.  Start Breztri 2 puffs twice daily  2.  Albuterol as needed for dyspnea or wheezing      Orders:  -     Home Sleep Study; Future    6. Anxiety and depression  Assessment & Plan:  1.  Stable on Zoloft 100 mg daily and Wellbutrin 150 mg daily  2.  Discontinue hydroxyzine, concerned it may be causing hallucination  3.  Start Trazodone 50 mg nightly      7. Insomnia, unspecified type  Assessment & Plan:  1.  Start trazodone 50 mg nightly  2. Reorder sleep study    Orders:  -     Home Sleep Study; Future    8. Vitamin D deficiency  -     Vitamin D,25-Hydroxy    9. Preventative health care  -     Hemoglobin A1c    10. Hypersomnia, unspecified  -     Home Sleep Study; Future    11. Screening mammogram for breast cancer  -     Mammo Screening Digital Tomosynthesis Bilateral With CAD; Future    Other orders  -     montelukast (Singulair) 10 MG tablet; Take 1 tablet by mouth Every Night.  Dispense: 30 tablet; Refill: 1  -     Budeson-Glycopyrrol-Formoterol (Breztri Aerosphere) 160-9-4.8 MCG/ACT aerosol inhaler; Inhale 2 puffs 2 (Two) Times a Day.  Dispense: 8 g; Refill: 1  -     hydrALAZINE (APRESOLINE) 25 MG tablet; Take 1 tablet by mouth 3 (Three) Times a Day.  Dispense: 90 tablet; Refill: 0  -     traZODone (DESYREL) 50 MG tablet; Take 1 tablet by mouth Every Night.  Dispense: 30 tablet; Refill: 1         I spent 30 minutes caring for Mona on this date of service. This time includes time spent by me in the following activities:preparing for the visit, reviewing tests, obtaining and/or reviewing a separately obtained history, performing a medically appropriate examination and/or evaluation , counseling and educating the patient/family/caregiver, ordering medications, tests, or  procedures, documenting information in the medical record, independently interpreting results and communicating that information with the patient/family/caregiver and care coordination  Follow Up   Return in about 3 months (around 5/14/2023) for HTN/COPD.  Patient was given instructions and counseling regarding her condition or for health maintenance advice. Please see specific information pulled into the AVS if appropriate.

## 2023-02-14 NOTE — ASSESSMENT & PLAN NOTE
1.  Continue atenolol, Hyzaar and clonidine as prescribed  2.  Increase hydralazine 25 mg to 3 times daily  3.  Patient needs to call and schedule appointment with cardiology

## 2023-02-14 NOTE — ASSESSMENT & PLAN NOTE
1.  Stable on Zoloft 100 mg daily and Wellbutrin 150 mg daily  2.  Discontinue hydroxyzine, concerned it may be causing hallucination  3.  Start Trazodone 50 mg nightly

## 2023-02-14 NOTE — PROGRESS NOTES
Venipuncture Blood Specimen Collection  Venipuncture performed in the right arm by Marianela Flores MA with good hemostasis. Patient tolerated the procedure well without complications.   02/14/23   Marianela Flores MA

## 2023-02-15 LAB
25(OH)D3 SERPL-MCNC: 55.5 NG/ML (ref 30–100)
ALBUMIN SERPL-MCNC: 4.2 G/DL (ref 3.5–5.2)
ALBUMIN/GLOB SERPL: 1.4 G/DL
ALP SERPL-CCNC: 69 U/L (ref 39–117)
ALT SERPL W P-5'-P-CCNC: 27 U/L (ref 1–33)
ANION GAP SERPL CALCULATED.3IONS-SCNC: 7.5 MMOL/L (ref 5–15)
AST SERPL-CCNC: 29 U/L (ref 1–32)
BILIRUB SERPL-MCNC: 0.3 MG/DL (ref 0–1.2)
BUN SERPL-MCNC: 30 MG/DL (ref 8–23)
BUN/CREAT SERPL: 27.8 (ref 7–25)
CALCIUM SPEC-SCNC: 10 MG/DL (ref 8.6–10.5)
CHLORIDE SERPL-SCNC: 104 MMOL/L (ref 98–107)
CHOLEST SERPL-MCNC: 242 MG/DL (ref 0–200)
CO2 SERPL-SCNC: 31.5 MMOL/L (ref 22–29)
CREAT SERPL-MCNC: 1.08 MG/DL (ref 0.57–1)
DEPRECATED RDW RBC AUTO: 41 FL (ref 37–54)
EGFRCR SERPLBLD CKD-EPI 2021: 56.8 ML/MIN/1.73
ERYTHROCYTE [DISTWIDTH] IN BLOOD BY AUTOMATED COUNT: 12.8 % (ref 12.3–15.4)
GLOBULIN UR ELPH-MCNC: 3 GM/DL
GLUCOSE SERPL-MCNC: 93 MG/DL (ref 65–99)
HCT VFR BLD AUTO: 40.1 % (ref 34–46.6)
HDLC SERPL-MCNC: 37 MG/DL (ref 40–60)
HGB BLD-MCNC: 13.5 G/DL (ref 12–15.9)
LDLC SERPL CALC-MCNC: 136 MG/DL (ref 0–100)
LDLC/HDLC SERPL: 3.5 {RATIO}
MCH RBC QN AUTO: 29.5 PG (ref 26.6–33)
MCHC RBC AUTO-ENTMCNC: 33.7 G/DL (ref 31.5–35.7)
MCV RBC AUTO: 87.7 FL (ref 79–97)
PLATELET # BLD AUTO: 168 10*3/MM3 (ref 140–450)
PMV BLD AUTO: 11.9 FL (ref 6–12)
POTASSIUM SERPL-SCNC: 4 MMOL/L (ref 3.5–5.2)
PROT SERPL-MCNC: 7.2 G/DL (ref 6–8.5)
RBC # BLD AUTO: 4.57 10*6/MM3 (ref 3.77–5.28)
SODIUM SERPL-SCNC: 143 MMOL/L (ref 136–145)
TRIGL SERPL-MCNC: 377 MG/DL (ref 0–150)
TSH SERPL DL<=0.05 MIU/L-ACNC: 0.98 UIU/ML (ref 0.27–4.2)
VIT B12 BLD-MCNC: 876 PG/ML (ref 211–946)
VLDLC SERPL-MCNC: 69 MG/DL (ref 5–40)
WBC NRBC COR # BLD: 8.06 10*3/MM3 (ref 3.4–10.8)

## 2023-02-15 RX ORDER — MONTELUKAST SODIUM 10 MG/1
10 TABLET ORAL NIGHTLY
Qty: 90 TABLET | OUTPATIENT
Start: 2023-02-15

## 2023-02-15 RX ORDER — HYDRALAZINE HYDROCHLORIDE 25 MG/1
TABLET, FILM COATED ORAL
Qty: 270 TABLET | OUTPATIENT
Start: 2023-02-15

## 2023-02-15 RX ORDER — ATORVASTATIN CALCIUM 40 MG/1
40 TABLET, FILM COATED ORAL DAILY
Qty: 90 TABLET | Refills: 1 | Status: SHIPPED | OUTPATIENT
Start: 2023-02-15 | End: 2023-05-16

## 2023-02-15 NOTE — PROGRESS NOTES
Kidney function slightly declined but patient has since stopped Mobic so I expect that to improve.  Increase water intake as she appears slightly dehydrated.  Cholesterol is improving but remains elevated.  I would recommend increasing her Lipitor to 40 mg daily, prescription sent to pharmacy.  A1c is 6, consistent with prediabetes.  Continue limiting carbohydrates and concentrated sweets.  The rest of her labs are normal.

## 2023-02-20 RX ORDER — ATENOLOL 100 MG/1
TABLET ORAL
Qty: 90 TABLET | Refills: 0 | Status: SHIPPED | OUTPATIENT
Start: 2023-02-20

## 2023-02-28 ENCOUNTER — TELEPHONE (OUTPATIENT)
Dept: FAMILY MEDICINE CLINIC | Facility: CLINIC | Age: 68
End: 2023-02-28
Payer: MEDICARE

## 2023-02-28 NOTE — TELEPHONE ENCOUNTER
Called pt regarding mammo order, pt states will be calling [facility] today to schedule appt and will call office with appt   
shower stall/independent/needs device

## 2023-03-08 RX ORDER — LOSARTAN POTASSIUM AND HYDROCHLOROTHIAZIDE 25; 100 MG/1; MG/1
1 TABLET ORAL DAILY
Qty: 90 TABLET | Refills: 0 | Status: SHIPPED | OUTPATIENT
Start: 2023-03-08

## 2023-03-10 RX ORDER — MELOXICAM 15 MG/1
TABLET ORAL
Qty: 30 TABLET | Refills: 1 | OUTPATIENT
Start: 2023-03-10

## 2023-03-10 RX ORDER — BACLOFEN 10 MG/1
10 TABLET ORAL NIGHTLY
Qty: 30 TABLET | Refills: 1 | Status: SHIPPED | OUTPATIENT
Start: 2023-03-10

## 2023-03-15 RX ORDER — IBUPROFEN 800 MG/1
800 TABLET ORAL EVERY 8 HOURS PRN
Qty: 90 TABLET | Refills: 0 | Status: SHIPPED | OUTPATIENT
Start: 2023-03-15

## 2023-04-03 RX ORDER — HYDRALAZINE HYDROCHLORIDE 25 MG/1
TABLET, FILM COATED ORAL
Qty: 90 TABLET | Refills: 0 | Status: SHIPPED | OUTPATIENT
Start: 2023-04-03

## 2023-04-05 ENCOUNTER — TELEPHONE (OUTPATIENT)
Dept: FAMILY MEDICINE CLINIC | Facility: CLINIC | Age: 68
End: 2023-04-05
Payer: MEDICARE

## 2023-04-05 DIAGNOSIS — G47.33 OSA (OBSTRUCTIVE SLEEP APNEA): Primary | ICD-10-CM

## 2023-04-05 RX ORDER — HYDRALAZINE HYDROCHLORIDE 25 MG/1
TABLET, FILM COATED ORAL
Qty: 270 TABLET | OUTPATIENT
Start: 2023-04-05

## 2023-04-05 NOTE — TELEPHONE ENCOUNTER
It does not appear that these results were forwarded to me.  I have reviewed them.  Sleep study does show severe obstructive sleep apnea.  A CPAP is recommended if patient is agreeable.

## 2023-04-07 DIAGNOSIS — M51.36 DDD (DEGENERATIVE DISC DISEASE), LUMBAR: ICD-10-CM

## 2023-04-07 RX ORDER — OXYCODONE AND ACETAMINOPHEN 10; 325 MG/1; MG/1
1 TABLET ORAL EVERY 8 HOURS PRN
Qty: 90 TABLET | Refills: 0 | Status: SHIPPED | OUTPATIENT
Start: 2023-04-07

## 2023-04-07 NOTE — TELEPHONE ENCOUNTER
Pt stated that she is no longer seeing pain management. She has not seen them in about 3 months because at her last visit they did a UDS and she tested positive for THC. She said they were aware that she smokes marijuana, but they still would no longer give her pain medication. She has not had medication for around a month now and she only takes this as needed not even daily, but her pain with the weather has been so bad she cannot even walk in the morning. Please advise.

## 2023-04-07 NOTE — TELEPHONE ENCOUNTER
Airway  Urgency: elective    Airway not difficult    General Information and Staff    Patient location during procedure: OR  CRNA: PALLAVI MILLIGAN    Indications and Patient Condition  Indications for airway management: airway protection    Preoxygenated: yes  Mask difficulty assessment: 1 - vent by mask    Final Airway Details  Final airway type: endotracheal airway      Successful airway: ETT  Cuffed: yes   Successful intubation technique: direct laryngoscopy  Facilitating devices/methods: intubating stylet  Endotracheal tube insertion site: oral  Blade: Weaver  Blade size: #2  ETT size: 7.5 mm  Cormack-Lehane Classification: grade I - full view of glottis  Placement verified by: chest auscultation and capnometry   Cuff volume (mL): 8  Measured from: lips  ETT to lips (cm): 22  Number of attempts at approach: 1    Additional Comments  Atraumatic               I will fill for sparingly use. If medication has to be filled monthly, will need to be followed by pain mgmt which will require her to stop using marijuana.

## 2023-04-07 NOTE — TELEPHONE ENCOUNTER
Caller: Mona Sheffield    Relationship: Self    Best call back number: [phone]     Requested Prescriptions:   Requested Prescriptions     Pending Prescriptions Disp Refills   • oxyCODONE-acetaminophen (PERCOCET)  MG per tablet 90 tablet 0     Sig: Take 1 tablet by mouth Every 8 (Eight) Hours As Needed for Moderate Pain.        Pharmacy where request should be sent: [id number] [place] [address] - [phone] [facility] [phone]      Last office visit with prescribing clinician: 2/14/2023   Last telemedicine visit with prescribing clinician: 5/16/2023   Next office visit with prescribing clinician: 5/16/2023     Additional details provided by patient: OUT OF MEDICATION FOR 4 DAYS     Does the patient have less than a 3 day supply:  [x] Yes  [] No    Would you like a call back once the refill request has been completed: [x] Yes [] No    If the office needs to give you a call back, can they leave a voicemail: [x] Yes [] No    Stoney Tatum   04/07/23 13:28 EDT

## 2023-04-10 RX ORDER — TRAZODONE HYDROCHLORIDE 50 MG/1
50 TABLET ORAL NIGHTLY
Qty: 30 TABLET | Refills: 1 | Status: SHIPPED | OUTPATIENT
Start: 2023-04-10

## 2023-04-10 RX ORDER — SERTRALINE HYDROCHLORIDE 100 MG/1
TABLET, FILM COATED ORAL
Qty: 45 TABLET | Refills: 1 | Status: SHIPPED | OUTPATIENT
Start: 2023-04-10

## 2023-05-10 ENCOUNTER — TELEPHONE (OUTPATIENT)
Dept: FAMILY MEDICINE CLINIC | Facility: CLINIC | Age: 68
End: 2023-05-10
Payer: MEDICARE

## 2023-05-10 NOTE — TELEPHONE ENCOUNTER
Caller: Ashlee Sheffield    Relationship: Self    Best call back number:[phone]    PATIENT WANTED TO INFORM OFFICE THAT SHE HAD HER MAMMOGRAM COMPLETED AT [facility] .     ASHLEE IS ALSO USING HER OXYGEN MACHINE AT NIGHT AND IT SEEMS TO BE WORKING OKAY.     SHE WILL BE CALLING CARDIOLOGIST TO SCHEDULE NEXT    NO CALLBACK NECESSARY

## 2023-05-10 NOTE — TELEPHONE ENCOUNTER
Left message for patient letting her know we have not gotten records yet. Advised pt to complete a release and we can fax to her old office to request records.

## 2023-05-10 NOTE — TELEPHONE ENCOUNTER
Caller: Mona Sheffield    Relationship: Self    Best call back number: [phone]    PATIENT CALLED  WONDERING IF BROAINSLEY MARCUS HAS RECEIVED HER MEDICAL RECORDS FROM THE DOCTOR IN [place].    PLEASE ADVISE

## 2023-05-22 RX ORDER — ATENOLOL 100 MG/1
TABLET ORAL
Qty: 90 TABLET | Refills: 0 | Status: SHIPPED | OUTPATIENT
Start: 2023-05-22

## 2023-05-23 DIAGNOSIS — Z12.31 SCREENING MAMMOGRAM FOR BREAST CANCER: ICD-10-CM

## 2023-06-09 RX ORDER — LOSARTAN POTASSIUM AND HYDROCHLOROTHIAZIDE 25; 100 MG/1; MG/1
1 TABLET ORAL DAILY
Qty: 90 TABLET | Refills: 0 | Status: SHIPPED | OUTPATIENT
Start: 2023-06-09

## 2023-06-19 RX ORDER — SERTRALINE HYDROCHLORIDE 100 MG/1
TABLET, FILM COATED ORAL
Qty: 135 TABLET | OUTPATIENT
Start: 2023-06-19

## 2023-06-19 RX ORDER — SERTRALINE HYDROCHLORIDE 100 MG/1
TABLET, FILM COATED ORAL
Qty: 45 TABLET | Refills: 1 | Status: SHIPPED | OUTPATIENT
Start: 2023-06-19

## 2023-07-11 PROBLEM — E78.2 MIXED HYPERLIPIDEMIA: Status: ACTIVE | Noted: 2023-07-11

## 2023-07-11 PROBLEM — R73.03 PREDIABETES: Status: ACTIVE | Noted: 2023-07-11

## 2023-07-11 PROBLEM — G47.33 OSA (OBSTRUCTIVE SLEEP APNEA): Status: ACTIVE | Noted: 2023-07-11

## 2023-07-25 DIAGNOSIS — M51.36 DDD (DEGENERATIVE DISC DISEASE), LUMBAR: ICD-10-CM

## 2023-07-25 RX ORDER — OXYCODONE AND ACETAMINOPHEN 10; 325 MG/1; MG/1
1 TABLET ORAL 2 TIMES DAILY PRN
Qty: 60 TABLET | Refills: 0 | Status: SHIPPED | OUTPATIENT
Start: 2023-07-25 | End: 2023-07-25 | Stop reason: SDUPTHER

## 2023-07-25 RX ORDER — OXYCODONE AND ACETAMINOPHEN 10; 325 MG/1; MG/1
1 TABLET ORAL 2 TIMES DAILY PRN
Qty: 60 TABLET | Refills: 0 | Status: SHIPPED | OUTPATIENT
Start: 2023-07-25

## 2023-07-25 NOTE — TELEPHONE ENCOUNTER
Caller: BozenamoisesKrunalMona J    Relationship: Self    Best call back number: [phone]     Requested Prescriptions:   Requested Prescriptions     Pending Prescriptions Disp Refills    oxyCODONE-acetaminophen (PERCOCET)  MG per tablet 60 tablet 0     Sig: Take 1 tablet by mouth 2 (Two) Times a Day As Needed for Moderate Pain.        Pharmacy where request should be sent: [facility] [place] [id number] [place] [address] [phone] FX     Last office visit with prescribing clinician: 7/11/2023   Last telemedicine visit with prescribing clinician: Visit date not found   Next office visit with prescribing clinician: 11/17/2023     Additional details provided by patient: PATIENT HAS 4-5 TABLETS LEFT     Does the patient have less than a 3 day supply:  [x] Yes  [] No    Would you like a call back once the refill request has been completed: [x] Yes [] No    If the office needs to give you a call back, can they leave a voicemail: [x] Yes [] No    MARIAMA Olguin   07/25/23 14:23 EDT

## 2023-07-25 NOTE — TELEPHONE ENCOUNTER
Pt is requesting to go to mail order pharmacy because she cannot get it at her local pharmacy. Called [facility] and cancelled script and verified that it was not filled.

## 2023-07-26 ENCOUNTER — TELEPHONE (OUTPATIENT)
Dept: FAMILY MEDICINE CLINIC | Facility: CLINIC | Age: 68
End: 2023-07-26

## 2023-07-26 NOTE — TELEPHONE ENCOUNTER
Caller: Mona Sheffield    Relationship: Self    Best call back number: [phone]     What is the best time to reach you: ANY TIME    Who are you requesting to speak with (clinical staff, provider,  specific staff member): CLINICAL STAFF    What was the call regarding: PATIENT STATES THE MAIL ORDER PHARMACY THAT HER oxyCODONE-acetaminophen (PERCOCET)  MG per tablet  PRESCRIPTION WAS SENT TO DOES NOT CARRY NARCOTICS AND SHE NEEDS IT SENT TO [facility]. FAX: [phone]    Is it okay if the provider responds through MyChart: NO, PHONE CALL

## 2023-07-27 DIAGNOSIS — M51.36 DDD (DEGENERATIVE DISC DISEASE), LUMBAR: ICD-10-CM

## 2023-07-27 NOTE — TELEPHONE ENCOUNTER
The one on formulary is for injured workers pharmacy. She wants this medication to go to [facility] every refill?

## 2023-07-27 NOTE — TELEPHONE ENCOUNTER
What is [facility]? Narcotics need to be sent to same pharmacy each month and they need to be sent electronically. We cannot fax paper scripts each month on controlled substances.

## 2023-07-27 NOTE — TELEPHONE ENCOUNTER
Pt called in regarding medication request. Pt states [facility] is [facility], she has used this pharmacy for sometime now. Pt also states it goes to her  for insurance. Pt left number for BERTHA [phone].  Please adivse

## 2023-08-01 DIAGNOSIS — M51.36 DDD (DEGENERATIVE DISC DISEASE), LUMBAR: ICD-10-CM

## 2023-08-01 RX ORDER — OXYCODONE AND ACETAMINOPHEN 10; 325 MG/1; MG/1
1 TABLET ORAL 2 TIMES DAILY PRN
Qty: 60 TABLET | Refills: 0 | Status: SHIPPED | OUTPATIENT
Start: 2023-08-01

## 2023-08-01 RX ORDER — OXYCODONE AND ACETAMINOPHEN 10; 325 MG/1; MG/1
1 TABLET ORAL 2 TIMES DAILY PRN
Qty: 60 TABLET | Refills: 0 | Status: SHIPPED | OUTPATIENT
Start: 2023-08-01 | End: 2023-08-01 | Stop reason: SDUPTHER

## 2023-08-01 NOTE — TELEPHONE ENCOUNTER
I sent script to above noted pharmacy. It will need to be sent there on a regular basis. Let me  know if any difficult filling

## 2023-08-10 ENCOUNTER — HOSPITAL ENCOUNTER (OUTPATIENT)
Facility: HOSPITAL | Age: 68
Setting detail: OBSERVATION
Discharge: HOME | End: 2023-08-13
Attending: EMERGENCY MEDICINE | Admitting: EMERGENCY MEDICINE
Payer: MEDICARE

## 2023-08-10 DIAGNOSIS — N39.0 URINARY TRACT INFECTION WITHOUT HEMATURIA, SITE UNSPECIFIED: Primary | ICD-10-CM

## 2023-08-10 DIAGNOSIS — R33.9 URINARY RETENTION: ICD-10-CM

## 2023-08-10 LAB
ANION GAP SERPL CALCULATED.3IONS-SCNC: 12 MMOL/L (ref 5–15)
BACTERIA UR QL AUTO: ABNORMAL /HPF
BASOPHILS # BLD AUTO: 0 10*3/MM3 (ref 0–0.2)
BASOPHILS NFR BLD AUTO: 0.5 % (ref 0–1.5)
BILIRUB UR QL STRIP: NEGATIVE
BUN SERPL-MCNC: 50 MG/DL (ref 8–23)
BUN/CREAT SERPL: 37.6 (ref 7–25)
CALCIUM SPEC-SCNC: 9.2 MG/DL (ref 8.6–10.5)
CHLORIDE SERPL-SCNC: 99 MMOL/L (ref 98–107)
CLARITY UR: ABNORMAL
CO2 SERPL-SCNC: 28 MMOL/L (ref 22–29)
COLOR UR: YELLOW
CREAT SERPL-MCNC: 1.33 MG/DL (ref 0.57–1)
D-LACTATE SERPL-SCNC: 0.4 MMOL/L (ref 0.3–2)
DEPRECATED RDW RBC AUTO: 43.3 FL (ref 37–54)
EGFRCR SERPLBLD CKD-EPI 2021: 44.2 ML/MIN/1.73
EOSINOPHIL # BLD AUTO: 0.1 10*3/MM3 (ref 0–0.4)
EOSINOPHIL NFR BLD AUTO: 0.7 % (ref 0.3–6.2)
ERYTHROCYTE [DISTWIDTH] IN BLOOD BY AUTOMATED COUNT: 13.5 % (ref 12.3–15.4)
GLUCOSE BLDC GLUCOMTR-MCNC: 110 MG/DL (ref 70–105)
GLUCOSE SERPL-MCNC: 116 MG/DL (ref 65–99)
GLUCOSE UR STRIP-MCNC: NEGATIVE MG/DL
HCT VFR BLD AUTO: 39.2 % (ref 34–46.6)
HGB BLD-MCNC: 13.1 G/DL (ref 12–15.9)
HGB UR QL STRIP.AUTO: ABNORMAL
HOLD SPECIMEN: NORMAL
HOLD SPECIMEN: NORMAL
HYALINE CASTS UR QL AUTO: ABNORMAL /LPF
KETONES UR QL STRIP: NEGATIVE
LEUKOCYTE ESTERASE UR QL STRIP.AUTO: ABNORMAL
LYMPHOCYTES # BLD AUTO: 1.4 10*3/MM3 (ref 0.7–3.1)
LYMPHOCYTES NFR BLD AUTO: 13.5 % (ref 19.6–45.3)
MCH RBC QN AUTO: 29.3 PG (ref 26.6–33)
MCHC RBC AUTO-ENTMCNC: 33.6 G/DL (ref 31.5–35.7)
MCV RBC AUTO: 87.3 FL (ref 79–97)
MONOCYTES # BLD AUTO: 0.9 10*3/MM3 (ref 0.1–0.9)
MONOCYTES NFR BLD AUTO: 8.5 % (ref 5–12)
NEUTROPHILS NFR BLD AUTO: 7.9 10*3/MM3 (ref 1.7–7)
NEUTROPHILS NFR BLD AUTO: 76.8 % (ref 42.7–76)
NITRITE UR QL STRIP: NEGATIVE
NRBC BLD AUTO-RTO: 0 /100 WBC (ref 0–0.2)
PH UR STRIP.AUTO: 5.5 [PH] (ref 5–8)
PLATELET # BLD AUTO: 195 10*3/MM3 (ref 140–450)
PMV BLD AUTO: 10 FL (ref 6–12)
POTASSIUM SERPL-SCNC: 3.3 MMOL/L (ref 3.5–5.2)
PROT UR QL STRIP: ABNORMAL
RBC # BLD AUTO: 4.49 10*6/MM3 (ref 3.77–5.28)
RBC # UR STRIP: ABNORMAL /HPF
REF LAB TEST METHOD: ABNORMAL
SODIUM SERPL-SCNC: 139 MMOL/L (ref 136–145)
SP GR UR STRIP: 1.01 (ref 1–1.03)
SQUAMOUS #/AREA URNS HPF: ABNORMAL /HPF
UROBILINOGEN UR QL STRIP: ABNORMAL
WBC # UR STRIP: ABNORMAL /HPF
WBC NRBC COR # BLD: 10.3 10*3/MM3 (ref 3.4–10.8)
WHOLE BLOOD HOLD COAG: NORMAL
WHOLE BLOOD HOLD SPECIMEN: NORMAL

## 2023-08-10 PROCEDURE — 51702 INSERT TEMP BLADDER CATH: CPT

## 2023-08-10 PROCEDURE — 99284 EMERGENCY DEPT VISIT MOD MDM: CPT

## 2023-08-10 PROCEDURE — 80048 BASIC METABOLIC PNL TOTAL CA: CPT | Performed by: EMERGENCY MEDICINE

## 2023-08-10 PROCEDURE — 25010000002 ONDANSETRON PER 1 MG: Performed by: EMERGENCY MEDICINE

## 2023-08-10 PROCEDURE — 87040 BLOOD CULTURE FOR BACTERIA: CPT | Performed by: EMERGENCY MEDICINE

## 2023-08-10 PROCEDURE — 96375 TX/PRO/DX INJ NEW DRUG ADDON: CPT

## 2023-08-10 PROCEDURE — 82948 REAGENT STRIP/BLOOD GLUCOSE: CPT

## 2023-08-10 PROCEDURE — 51798 US URINE CAPACITY MEASURE: CPT

## 2023-08-10 PROCEDURE — 83605 ASSAY OF LACTIC ACID: CPT

## 2023-08-10 PROCEDURE — 96365 THER/PROPH/DIAG IV INF INIT: CPT

## 2023-08-10 PROCEDURE — 81001 URINALYSIS AUTO W/SCOPE: CPT | Performed by: EMERGENCY MEDICINE

## 2023-08-10 PROCEDURE — 25010000002 CEFTRIAXONE PER 250 MG: Performed by: EMERGENCY MEDICINE

## 2023-08-10 PROCEDURE — 85025 COMPLETE CBC W/AUTO DIFF WBC: CPT | Performed by: EMERGENCY MEDICINE

## 2023-08-10 PROCEDURE — 25010000002 MORPHINE PER 10 MG: Performed by: EMERGENCY MEDICINE

## 2023-08-10 PROCEDURE — 87086 URINE CULTURE/COLONY COUNT: CPT | Performed by: EMERGENCY MEDICINE

## 2023-08-10 PROCEDURE — 36415 COLL VENOUS BLD VENIPUNCTURE: CPT

## 2023-08-10 RX ORDER — ONDANSETRON 2 MG/ML
4 INJECTION INTRAMUSCULAR; INTRAVENOUS ONCE
Status: COMPLETED | OUTPATIENT
Start: 2023-08-10 | End: 2023-08-10

## 2023-08-10 RX ORDER — SODIUM CHLORIDE 0.9 % (FLUSH) 0.9 %
10 SYRINGE (ML) INJECTION AS NEEDED
Status: DISCONTINUED | OUTPATIENT
Start: 2023-08-10 | End: 2023-08-13 | Stop reason: HOSPADM

## 2023-08-10 RX ADMIN — CEFTRIAXONE 2000 MG: 2 INJECTION, POWDER, FOR SOLUTION INTRAMUSCULAR; INTRAVENOUS at 22:20

## 2023-08-10 RX ADMIN — ONDANSETRON 4 MG: 2 INJECTION INTRAMUSCULAR; INTRAVENOUS at 20:54

## 2023-08-10 RX ADMIN — MORPHINE SULFATE 4 MG: 4 INJECTION, SOLUTION INTRAMUSCULAR; INTRAVENOUS at 20:54

## 2023-08-10 RX ADMIN — SODIUM CHLORIDE 1000 ML: 9 INJECTION, SOLUTION INTRAVENOUS at 22:20

## 2023-08-10 NOTE — ED PROVIDER NOTES
Subjective   History of Present Illness  67-year-old female states that for the last 1 week she has had increasing dysuria urgency and frequency and suprapubic discomfort.  She states she did have a low-grade fever.  She has had nausea without vomiting.  She reports no flank pain or diarrhea or constipation.  She reports no numbness or weakness in extremities.  She reports a history of chronic back pain without change.  She reports feeling generally weak.  Review of Systems    Past Medical History:   Diagnosis Date    Arthritis     Asthma     Depression     Hypertension     Injury of back     Pneumonia        No Known Allergies    Past Surgical History:   Procedure Laterality Date    APPENDECTOMY      CHOLECYSTECTOMY      HYSTERECTOMY      SPINAL CORD STIMULATOR TRIAL W/ LAMINOTOMY      TONSILLECTOMY         Family History   Problem Relation Age of Onset    Hypertension Mother     Heart failure Mother     Hypertension Father     Heart disease Father     Heart failure Brother        Social History     Socioeconomic History    Marital status:    Tobacco Use    Smoking status: Every Day     Packs/day: 0.50     Years: 50.00     Pack years: 25.00     Types: Cigarettes    Smokeless tobacco: Never   Vaping Use    Vaping Use: Never used   Substance and Sexual Activity    Alcohol use: Yes     Alcohol/week: 2.0 standard drinks     Types: 1 Glasses of wine, 1 Cans of beer per week     Comment: OCC    Drug use: Yes     Types: Marijuana     Comment: medical marijuana       Prior to Admission medications    Medication Sig Start Date End Date Taking? Authorizing Provider   albuterol (PROVENTIL) (2.5 MG/3ML) 0.083% nebulizer solution Take 2.5 mg by nebulization Every 4 (Four) Hours As Needed for Wheezing. 7/11/23   Teresa Fernandez APRN   albuterol sulfate  (90 Base) MCG/ACT inhaler albuterol sulfate HFA 90 mcg/actuation aerosol inhaler   INHALE 2 PUFFS BY MOUTH EVERY 6 HOURS AS NEEDED    Emergency, Nurse Hiren, RN    atenolol (TENORMIN) 100 MG tablet TAKE 1 TABLET BY MOUTH EVERY DAY 5/22/23   Teresa Fernandez APRN   atorvastatin (LIPITOR) 40 MG tablet Take 1 tablet by mouth Daily for 90 days. 7/11/23 10/9/23  Teresa Fernandez APRN   baclofen (LIORESAL) 10 MG tablet TAKE 1 TABLET BY MOUTH EVERY NIGHT 3/10/23   Teresa Fernandez APRN   Budeson-Glycopyrrol-Formoterol (Breztri Aerosphere) 160-9-4.8 MCG/ACT aerosol inhaler Inhale 2 puffs 2 (Two) Times a Day. 7/11/23   Teresa Fernandez APRN   buPROPion XL (WELLBUTRIN XL) 150 MG 24 hr tablet TAKE 1 TABLET BY MOUTH DAILY 1/24/23   Teresa Fernandez APRN   cloNIDine (CATAPRES) 0.2 MG tablet TAKE 1 TABLET BY MOUTH TWICE DAILY 1/23/23   Teresa Fernandez APRN   dexlansoprazole (DEXILANT) 60 MG capsule Take 1 capsule by mouth Daily.    Provider, MD Olman   Diclofenac Sodium (VOLTAREN) 1 % gel gel Apply 4 g topically to the appropriate area as directed 4 (Four) Times a Day As Needed (hand pain). 7/11/23   Teresa Fernandez APRN   fluticasone (FLONASE) 50 MCG/ACT nasal spray fluticasone propionate 50 mcg/actuation nasal spray,suspension    Emergency, Nurse Hiren RN   hydrALAZINE (APRESOLINE) 25 MG tablet TAKE 1 TABLET BY MOUTH THREE TIMES DAILY 4/3/23   Teresa Fernandez APRN   hydrOXYzine pamoate (VISTARIL) 25 MG capsule Take 1 capsule by mouth 3 (Three) Times a Day As Needed for Anxiety. 1/15/23   Teresa Fernandez APRN   ibuprofen (ADVIL,MOTRIN) 800 MG tablet Take 1 tablet by mouth Every 8 (Eight) Hours As Needed for Moderate Pain. 3/15/23   Teresa Fernandez APRN   lidocaine (LIDODERM) 5 % Lidoderm 5 % topical patch   APPLY 1 PATCH BY TRANSDERMAL ROUTE ONCE DAILY (MAY WEAR UP TO 12HOURS.)    Emergency, Nurse Epic, RN   Lidocaine, Anorectal, (LMX 5) 5 % cream cream Apply nightly as needed. 8/30/22   Teresa Fernandez APRN   losartan-hydrochlorothiazide (HYZAAR) 100-25 MG per tablet TAKE 1 TABLET BY MOUTH DAILY 6/9/23   Teresa Fernandez APRN  "  memantine (NAMENDA) 5 MG tablet TAKE 1 TABLET BY MOUTH DAILY 1/23/23   Teresa Fernandez APRN   montelukast (Singulair) 10 MG tablet Take 1 tablet by mouth Every Night. 2/14/23   Teresa Fernandez APRN   oxyCODONE-acetaminophen (PERCOCET)  MG per tablet Take 1 tablet by mouth 2 (Two) Times a Day As Needed for Moderate Pain. 8/1/23   Teresa Fernandez APRN   sertraline (ZOLOFT) 100 MG tablet TAKE 1 AND 1/2 TABLETS BY MOUTH DAILY 7/17/23   Teresa Fernandez APRN   SUMAtriptan (Imitrex) 25 MG tablet Take 1 tablet by mouth 1 (One) Time As Needed for Migraine for up to 1 dose. Take one tablet at onset of headache. May repeat dose one time in 2 hours if headache not relieved. 6/22/22   Teresa Fernandez APRN   traZODone (DESYREL) 50 MG tablet TAKE 1 TABLET BY MOUTH EVERY NIGHT 4/10/23   Teresa Fernandez APRN   triamcinolone (KENALOG) 0.1 % cream Apply 1 application  topically to the appropriate area as directed 2 (Two) Times a Day. 7/11/23   Teresa Fernandez APRN     /64 (BP Location: Left arm, Patient Position: Lying)   Pulse 59   Temp 98.4 øF (36.9 øC) (Oral)   Resp 17   Ht 165.1 cm (65\")   Wt 81.6 kg (180 lb)   SpO2 94%   BMI 29.95 kg/mý       Objective   Physical Exam  General: Well-developed well-appearing, no acute distress, alert and appropriate  Eyes:  sclera nonicteric, pupils normal  HEENT: Mucous membranes moist, no mucosal swelling  Neck: Supple, no nuchal rigidity, no JVD  Respirations: Respirations nonlabored, equal breath sounds bilaterally, clear lungs  Heart regular rate and rhythm, no murmurs rubs or gallops,   Abdomen soft, mildly tender palpation in the suprapubic region, no rebound or guarding, nondistended, no hepatosplenomegaly, no hernia, no mass, normal bowel sounds, no CVA tenderness, postsurgical scars on the anterior abdomen  Extremities no clubbing cyanosis or edema, calves are symmetric and nontender  Neuro cranial nerves grossly intact, no focal " limb deficits, normal and equal deep tendon reflexes in bilateral lower extremities  Psych oriented, pleasant affect  Skin no rash, brisk cap refill  Procedures           ED Course      Results for orders placed or performed during the hospital encounter of 08/10/23   Basic Metabolic Panel    Specimen: Blood   Result Value Ref Range    Glucose 116 (H) 65 - 99 mg/dL    BUN 50 (H) 8 - 23 mg/dL    Creatinine 1.33 (H) 0.57 - 1.00 mg/dL    Sodium 139 136 - 145 mmol/L    Potassium 3.3 (L) 3.5 - 5.2 mmol/L    Chloride 99 98 - 107 mmol/L    CO2 28.0 22.0 - 29.0 mmol/L    Calcium 9.2 8.6 - 10.5 mg/dL    BUN/Creatinine Ratio 37.6 (H) 7.0 - 25.0    Anion Gap 12.0 5.0 - 15.0 mmol/L    eGFR 44.2 (L) >60.0 mL/min/1.73   Urinalysis With Culture If Indicated - Urine, Clean Catch    Specimen: Urine, Clean Catch   Result Value Ref Range    Color, UA Yellow Yellow, Straw    Appearance, UA Turbid (A) Clear    pH, UA 5.5 5.0 - 8.0    Specific Gravity, UA 1.012 1.005 - 1.030    Glucose, UA Negative Negative    Ketones, UA Negative Negative    Bilirubin, UA Negative Negative    Blood, UA Moderate (2+) (A) Negative    Protein,  mg/dL (2+) (A) Negative    Leuk Esterase, UA Large (3+) (A) Negative    Nitrite, UA Negative Negative    Urobilinogen, UA 1.0 E.U./dL 0.2 - 1.0 E.U./dL   CBC Auto Differential    Specimen: Blood   Result Value Ref Range    WBC 10.30 3.40 - 10.80 10*3/mm3    RBC 4.49 3.77 - 5.28 10*6/mm3    Hemoglobin 13.1 12.0 - 15.9 g/dL    Hematocrit 39.2 34.0 - 46.6 %    MCV 87.3 79.0 - 97.0 fL    MCH 29.3 26.6 - 33.0 pg    MCHC 33.6 31.5 - 35.7 g/dL    RDW 13.5 12.3 - 15.4 %    RDW-SD 43.3 37.0 - 54.0 fl    MPV 10.0 6.0 - 12.0 fL    Platelets 195 140 - 450 10*3/mm3    Neutrophil % 76.8 (H) 42.7 - 76.0 %    Lymphocyte % 13.5 (L) 19.6 - 45.3 %    Monocyte % 8.5 5.0 - 12.0 %    Eosinophil % 0.7 0.3 - 6.2 %    Basophil % 0.5 0.0 - 1.5 %    Neutrophils, Absolute 7.90 (H) 1.70 - 7.00 10*3/mm3    Lymphocytes, Absolute 1.40 0.70 -  3.10 10*3/mm3    Monocytes, Absolute 0.90 0.10 - 0.90 10*3/mm3    Eosinophils, Absolute 0.10 0.00 - 0.40 10*3/mm3    Basophils, Absolute 0.00 0.00 - 0.20 10*3/mm3    nRBC 0.0 0.0 - 0.2 /100 WBC   Urinalysis, Microscopic Only - Urine, Clean Catch    Specimen: Urine, Clean Catch   Result Value Ref Range    RBC, UA 31-50 (A) None Seen /HPF    WBC, UA Too Numerous to Count (A) None Seen /HPF    Bacteria, UA 4+ (A) None Seen /HPF    Squamous Epithelial Cells, UA 7-12 (A) None Seen, 0-2 /HPF    Hyaline Casts, UA 0-2 None Seen /LPF    Methodology Automated Microscopy    POC Glucose Once    Specimen: Blood   Result Value Ref Range    Glucose 110 (H) 70 - 105 mg/dL   POC Lactate    Specimen: Blood   Result Value Ref Range    Lactate 0.4 0.3 - 2.0 mmol/L   Green Top (Gel)   Result Value Ref Range    Extra Tube Hold for add-ons.    Lavender Top   Result Value Ref Range    Extra Tube hold for add-on    Gold Top - SST   Result Value Ref Range    Extra Tube Hold for add-ons.    Light Blue Top   Result Value Ref Range    Extra Tube Hold for add-ons.                                             Medical Decision Making  Patient presented with some suprapubic discomfort and urinary symptoms.  She was found to have some urinary retention with a bladder scan that showed around 700 cc of urine postvoid.  Differential diagnosis including outlet obstruction, cauda equina syndrome, urinary tract infection    Laboratory findings of urinary tract infection with 4+ bacteria, lactate normal, Chem-7 shows some renal insufficiency which may have been related to her urinary retention.  Owen catheter was placed and bladder was decompressed.  She was ordered IV fluids and IV Rocephin.  Blood cultures pending.  She is agreeable to plan of observation and will consult urology for urinary retention.    Problems Addressed:  Urinary retention: complicated acute illness or injury  Urinary tract infection without hematuria, site unspecified: complicated  acute illness or injury    Amount and/or Complexity of Data Reviewed  Labs: ordered. Decision-making details documented in ED Course.    Risk  Prescription drug management.  Decision regarding hospitalization.        Final diagnoses:   Urinary tract infection without hematuria, site unspecified   Urinary retention       ED Disposition  ED Disposition       ED Disposition   Decision to Admit    Condition   --    Comment   --               No follow-up provider specified.       Medication List      No changes were made to your prescriptions during this visit.            Elias Mata MD  08/11/23 0057

## 2023-08-11 PROBLEM — N39.0 UTI (URINARY TRACT INFECTION): Status: ACTIVE | Noted: 2023-08-11

## 2023-08-11 LAB
ANION GAP SERPL CALCULATED.3IONS-SCNC: 10 MMOL/L (ref 5–15)
BACTERIA SPEC AEROBE CULT: NORMAL
BASOPHILS # BLD AUTO: 0 10*3/MM3 (ref 0–0.2)
BASOPHILS NFR BLD AUTO: 0.4 % (ref 0–1.5)
BUN SERPL-MCNC: 42 MG/DL (ref 8–23)
BUN/CREAT SERPL: 40 (ref 7–25)
CALCIUM SPEC-SCNC: 8.4 MG/DL (ref 8.6–10.5)
CHLORIDE SERPL-SCNC: 104 MMOL/L (ref 98–107)
CO2 SERPL-SCNC: 27 MMOL/L (ref 22–29)
CREAT SERPL-MCNC: 1.05 MG/DL (ref 0.57–1)
DEPRECATED RDW RBC AUTO: 43.3 FL (ref 37–54)
EGFRCR SERPLBLD CKD-EPI 2021: 58.7 ML/MIN/1.73
EOSINOPHIL # BLD AUTO: 0.1 10*3/MM3 (ref 0–0.4)
EOSINOPHIL NFR BLD AUTO: 1.6 % (ref 0.3–6.2)
ERYTHROCYTE [DISTWIDTH] IN BLOOD BY AUTOMATED COUNT: 13.5 % (ref 12.3–15.4)
GLUCOSE SERPL-MCNC: 110 MG/DL (ref 65–99)
HCT VFR BLD AUTO: 34.3 % (ref 34–46.6)
HGB BLD-MCNC: 11.5 G/DL (ref 12–15.9)
LYMPHOCYTES # BLD AUTO: 2.1 10*3/MM3 (ref 0.7–3.1)
LYMPHOCYTES NFR BLD AUTO: 24.3 % (ref 19.6–45.3)
MCH RBC QN AUTO: 29.4 PG (ref 26.6–33)
MCHC RBC AUTO-ENTMCNC: 33.6 G/DL (ref 31.5–35.7)
MCV RBC AUTO: 87.5 FL (ref 79–97)
MONOCYTES # BLD AUTO: 0.9 10*3/MM3 (ref 0.1–0.9)
MONOCYTES NFR BLD AUTO: 10.2 % (ref 5–12)
NEUTROPHILS NFR BLD AUTO: 5.5 10*3/MM3 (ref 1.7–7)
NEUTROPHILS NFR BLD AUTO: 63.5 % (ref 42.7–76)
NRBC BLD AUTO-RTO: 0.1 /100 WBC (ref 0–0.2)
PLATELET # BLD AUTO: 150 10*3/MM3 (ref 140–450)
PMV BLD AUTO: 10 FL (ref 6–12)
POTASSIUM SERPL-SCNC: 3.3 MMOL/L (ref 3.5–5.2)
POTASSIUM SERPL-SCNC: 4.1 MMOL/L (ref 3.5–5.2)
RBC # BLD AUTO: 3.92 10*6/MM3 (ref 3.77–5.28)
SODIUM SERPL-SCNC: 141 MMOL/L (ref 136–145)
WBC NRBC COR # BLD: 8.7 10*3/MM3 (ref 3.4–10.8)

## 2023-08-11 PROCEDURE — 25010000002 MORPHINE PER 10 MG: Performed by: EMERGENCY MEDICINE

## 2023-08-11 PROCEDURE — 84132 ASSAY OF SERUM POTASSIUM: CPT | Performed by: PHYSICIAN ASSISTANT

## 2023-08-11 PROCEDURE — 85025 COMPLETE CBC W/AUTO DIFF WBC: CPT | Performed by: EMERGENCY MEDICINE

## 2023-08-11 PROCEDURE — G0378 HOSPITAL OBSERVATION PER HR: HCPCS

## 2023-08-11 PROCEDURE — 96376 TX/PRO/DX INJ SAME DRUG ADON: CPT

## 2023-08-11 PROCEDURE — 25010000002 CEFTRIAXONE PER 250 MG: Performed by: PHYSICIAN ASSISTANT

## 2023-08-11 PROCEDURE — 80048 BASIC METABOLIC PNL TOTAL CA: CPT | Performed by: EMERGENCY MEDICINE

## 2023-08-11 PROCEDURE — 96361 HYDRATE IV INFUSION ADD-ON: CPT

## 2023-08-11 RX ORDER — ALBUTEROL SULFATE 2.5 MG/3ML
2.5 SOLUTION RESPIRATORY (INHALATION) EVERY 4 HOURS PRN
Status: DISCONTINUED | OUTPATIENT
Start: 2023-08-11 | End: 2023-08-13 | Stop reason: HOSPADM

## 2023-08-11 RX ORDER — SODIUM CHLORIDE, SODIUM LACTATE, POTASSIUM CHLORIDE, CALCIUM CHLORIDE 600; 310; 30; 20 MG/100ML; MG/100ML; MG/100ML; MG/100ML
100 INJECTION, SOLUTION INTRAVENOUS CONTINUOUS
Status: DISCONTINUED | OUTPATIENT
Start: 2023-08-11 | End: 2023-08-13 | Stop reason: HOSPADM

## 2023-08-11 RX ORDER — POLYETHYLENE GLYCOL 3350 17 G/17G
17 POWDER, FOR SOLUTION ORAL DAILY PRN
Status: DISCONTINUED | OUTPATIENT
Start: 2023-08-11 | End: 2023-08-13 | Stop reason: HOSPADM

## 2023-08-11 RX ORDER — MEMANTINE HYDROCHLORIDE 5 MG/1
5 TABLET ORAL DAILY
Status: DISCONTINUED | OUTPATIENT
Start: 2023-08-11 | End: 2023-08-13 | Stop reason: HOSPADM

## 2023-08-11 RX ORDER — SODIUM CHLORIDE 0.9 % (FLUSH) 0.9 %
10 SYRINGE (ML) INJECTION EVERY 12 HOURS SCHEDULED
Status: DISCONTINUED | OUTPATIENT
Start: 2023-08-11 | End: 2023-08-13 | Stop reason: HOSPADM

## 2023-08-11 RX ORDER — MONTELUKAST SODIUM 10 MG/1
10 TABLET ORAL NIGHTLY
Status: DISCONTINUED | OUTPATIENT
Start: 2023-08-11 | End: 2023-08-13 | Stop reason: HOSPADM

## 2023-08-11 RX ORDER — ALBUTEROL SULFATE 2.5 MG/3ML
2.5 SOLUTION RESPIRATORY (INHALATION) EVERY 6 HOURS PRN
Status: DISCONTINUED | OUTPATIENT
Start: 2023-08-11 | End: 2023-08-11 | Stop reason: SDUPTHER

## 2023-08-11 RX ORDER — ATORVASTATIN CALCIUM 40 MG/1
40 TABLET, FILM COATED ORAL NIGHTLY
Status: DISCONTINUED | OUTPATIENT
Start: 2023-08-11 | End: 2023-08-13 | Stop reason: HOSPADM

## 2023-08-11 RX ORDER — PANTOPRAZOLE SODIUM 40 MG/1
40 TABLET, DELAYED RELEASE ORAL
Status: DISCONTINUED | OUTPATIENT
Start: 2023-08-11 | End: 2023-08-13 | Stop reason: HOSPADM

## 2023-08-11 RX ORDER — ONDANSETRON 2 MG/ML
4 INJECTION INTRAMUSCULAR; INTRAVENOUS EVERY 6 HOURS PRN
Status: DISCONTINUED | OUTPATIENT
Start: 2023-08-11 | End: 2023-08-13 | Stop reason: HOSPADM

## 2023-08-11 RX ORDER — SODIUM CHLORIDE 9 MG/ML
40 INJECTION, SOLUTION INTRAVENOUS AS NEEDED
Status: DISCONTINUED | OUTPATIENT
Start: 2023-08-11 | End: 2023-08-13 | Stop reason: HOSPADM

## 2023-08-11 RX ORDER — CHOLECALCIFEROL (VITAMIN D3) 125 MCG
5 CAPSULE ORAL NIGHTLY PRN
Status: DISCONTINUED | OUTPATIENT
Start: 2023-08-11 | End: 2023-08-13 | Stop reason: HOSPADM

## 2023-08-11 RX ORDER — CLONIDINE HYDROCHLORIDE 0.1 MG/1
0.2 TABLET ORAL 2 TIMES DAILY
Status: DISCONTINUED | OUTPATIENT
Start: 2023-08-11 | End: 2023-08-13 | Stop reason: HOSPADM

## 2023-08-11 RX ORDER — SODIUM CHLORIDE 0.9 % (FLUSH) 0.9 %
10 SYRINGE (ML) INJECTION AS NEEDED
Status: DISCONTINUED | OUTPATIENT
Start: 2023-08-11 | End: 2023-08-13 | Stop reason: HOSPADM

## 2023-08-11 RX ORDER — AMOXICILLIN 250 MG
2 CAPSULE ORAL 2 TIMES DAILY
Status: DISCONTINUED | OUTPATIENT
Start: 2023-08-11 | End: 2023-08-13 | Stop reason: HOSPADM

## 2023-08-11 RX ORDER — BISACODYL 5 MG/1
5 TABLET, DELAYED RELEASE ORAL DAILY PRN
Status: DISCONTINUED | OUTPATIENT
Start: 2023-08-11 | End: 2023-08-13 | Stop reason: HOSPADM

## 2023-08-11 RX ORDER — HYDRALAZINE HYDROCHLORIDE 25 MG/1
25 TABLET, FILM COATED ORAL 3 TIMES DAILY
Status: DISCONTINUED | OUTPATIENT
Start: 2023-08-11 | End: 2023-08-13 | Stop reason: HOSPADM

## 2023-08-11 RX ORDER — POTASSIUM CHLORIDE 20 MEQ/1
40 TABLET, EXTENDED RELEASE ORAL ONCE
Status: COMPLETED | OUTPATIENT
Start: 2023-08-11 | End: 2023-08-11

## 2023-08-11 RX ORDER — BUPROPION HYDROCHLORIDE 150 MG/1
150 TABLET ORAL DAILY
Status: DISCONTINUED | OUTPATIENT
Start: 2023-08-11 | End: 2023-08-13 | Stop reason: HOSPADM

## 2023-08-11 RX ORDER — TRAZODONE HYDROCHLORIDE 50 MG/1
50 TABLET ORAL NIGHTLY
Status: DISCONTINUED | OUTPATIENT
Start: 2023-08-11 | End: 2023-08-13 | Stop reason: HOSPADM

## 2023-08-11 RX ORDER — BISACODYL 10 MG
10 SUPPOSITORY, RECTAL RECTAL DAILY PRN
Status: DISCONTINUED | OUTPATIENT
Start: 2023-08-11 | End: 2023-08-13 | Stop reason: HOSPADM

## 2023-08-11 RX ORDER — ATENOLOL 50 MG/1
100 TABLET ORAL DAILY
Status: DISCONTINUED | OUTPATIENT
Start: 2023-08-11 | End: 2023-08-13 | Stop reason: HOSPADM

## 2023-08-11 RX ORDER — POTASSIUM CHLORIDE 20 MEQ/1
40 TABLET, EXTENDED RELEASE ORAL EVERY 4 HOURS
Status: COMPLETED | OUTPATIENT
Start: 2023-08-11 | End: 2023-08-11

## 2023-08-11 RX ORDER — ACETAMINOPHEN 325 MG/1
650 TABLET ORAL EVERY 4 HOURS PRN
Status: DISCONTINUED | OUTPATIENT
Start: 2023-08-11 | End: 2023-08-13 | Stop reason: HOSPADM

## 2023-08-11 RX ORDER — OXYCODONE HYDROCHLORIDE 5 MG/1
10 TABLET ORAL EVERY 4 HOURS PRN
Status: DISCONTINUED | OUTPATIENT
Start: 2023-08-11 | End: 2023-08-13 | Stop reason: HOSPADM

## 2023-08-11 RX ADMIN — SODIUM CHLORIDE, POTASSIUM CHLORIDE, SODIUM LACTATE AND CALCIUM CHLORIDE 100 ML/HR: 600; 310; 30; 20 INJECTION, SOLUTION INTRAVENOUS at 11:24

## 2023-08-11 RX ADMIN — BUPROPION HYDROCHLORIDE 150 MG: 150 TABLET, EXTENDED RELEASE ORAL at 08:30

## 2023-08-11 RX ADMIN — MONTELUKAST 10 MG: 10 TABLET, FILM COATED ORAL at 02:21

## 2023-08-11 RX ADMIN — MEMANTINE HYDROCHLORIDE 5 MG: 5 TABLET ORAL at 08:30

## 2023-08-11 RX ADMIN — TRAZODONE HYDROCHLORIDE 50 MG: 50 TABLET ORAL at 23:24

## 2023-08-11 RX ADMIN — Medication 10 ML: at 08:30

## 2023-08-11 RX ADMIN — MORPHINE SULFATE 4 MG: 4 INJECTION, SOLUTION INTRAMUSCULAR; INTRAVENOUS at 17:26

## 2023-08-11 RX ADMIN — Medication 10 ML: at 21:02

## 2023-08-11 RX ADMIN — Medication 10 ML: at 02:14

## 2023-08-11 RX ADMIN — SERTRALINE HYDROCHLORIDE 150 MG: 100 TABLET ORAL at 08:30

## 2023-08-11 RX ADMIN — CLONIDINE HYDROCHLORIDE 0.2 MG: 0.1 TABLET ORAL at 02:21

## 2023-08-11 RX ADMIN — MORPHINE SULFATE 4 MG: 4 INJECTION, SOLUTION INTRAMUSCULAR; INTRAVENOUS at 20:57

## 2023-08-11 RX ADMIN — SODIUM CHLORIDE, POTASSIUM CHLORIDE, SODIUM LACTATE AND CALCIUM CHLORIDE 100 ML/HR: 600; 310; 30; 20 INJECTION, SOLUTION INTRAVENOUS at 02:14

## 2023-08-11 RX ADMIN — TRAZODONE HYDROCHLORIDE 50 MG: 50 TABLET ORAL at 02:21

## 2023-08-11 RX ADMIN — CEFTRIAXONE 1000 MG: 1 INJECTION, POWDER, FOR SOLUTION INTRAMUSCULAR; INTRAVENOUS at 20:58

## 2023-08-11 RX ADMIN — MONTELUKAST 10 MG: 10 TABLET, FILM COATED ORAL at 23:25

## 2023-08-11 RX ADMIN — MORPHINE SULFATE 4 MG: 4 INJECTION, SOLUTION INTRAMUSCULAR; INTRAVENOUS at 02:14

## 2023-08-11 RX ADMIN — POTASSIUM CHLORIDE 40 MEQ: 1500 TABLET, EXTENDED RELEASE ORAL at 01:18

## 2023-08-11 RX ADMIN — ATORVASTATIN CALCIUM 40 MG: 40 TABLET, FILM COATED ORAL at 20:59

## 2023-08-11 RX ADMIN — POTASSIUM CHLORIDE 40 MEQ: 1500 TABLET, EXTENDED RELEASE ORAL at 17:27

## 2023-08-11 RX ADMIN — HYDRALAZINE HYDROCHLORIDE 25 MG: 25 TABLET, FILM COATED ORAL at 17:27

## 2023-08-11 RX ADMIN — CLONIDINE HYDROCHLORIDE 0.2 MG: 0.1 TABLET ORAL at 20:59

## 2023-08-11 RX ADMIN — SODIUM CHLORIDE, POTASSIUM CHLORIDE, SODIUM LACTATE AND CALCIUM CHLORIDE 100 ML/HR: 600; 310; 30; 20 INJECTION, SOLUTION INTRAVENOUS at 20:58

## 2023-08-11 RX ADMIN — PANTOPRAZOLE SODIUM 40 MG: 40 TABLET, DELAYED RELEASE ORAL at 08:30

## 2023-08-11 RX ADMIN — POTASSIUM CHLORIDE 40 MEQ: 1500 TABLET, EXTENDED RELEASE ORAL at 11:24

## 2023-08-11 RX ADMIN — MORPHINE SULFATE 4 MG: 4 INJECTION, SOLUTION INTRAMUSCULAR; INTRAVENOUS at 08:39

## 2023-08-11 RX ADMIN — PANTOPRAZOLE SODIUM 40 MG: 40 TABLET, DELAYED RELEASE ORAL at 17:26

## 2023-08-11 RX ADMIN — HYDRALAZINE HYDROCHLORIDE 25 MG: 25 TABLET, FILM COATED ORAL at 20:59

## 2023-08-11 NOTE — PLAN OF CARE
Goal Outcome Evaluation:  Plan of Care Reviewed With: patient        Progress: no change  Outcome Evaluation: New admit from the ED to room 105 in stable condition with diagnosis of UTI and urinary retention. F/C was placed while in the ED. Pt has a urology consult in the am will await further orders from MD

## 2023-08-11 NOTE — CASE MANAGEMENT/SOCIAL WORK
Discharge Planning Assessment  [facility]     Patient Name: Mona Sheffield  MRN: [id number]  Today's Date: 8/11/2023    Admit Date: 8/10/2023    Plan: Home with brother.   Discharge Needs Assessment       Row Name 08/11/23 1348       Living Environment    People in Home sibling(s)    Current Living Arrangements home    Potentially Unsafe Housing Conditions none    Primary Care Provided by self    Provides Primary Care For no one    Family Caregiver if Needed sibling(s)    Family Caregiver Names Brother-Adrian    Quality of Family Relationships helpful;involved;supportive    Able to Return to Prior Arrangements yes       Resource/Environmental Concerns    Resource/Environmental Concerns none    Transportation Concerns none       Transition Planning    Patient/Family Anticipates Transition to home with family    Patient/Family Anticipated Services at Transition none    Transportation Anticipated family or friend will provide       Discharge Needs Assessment    Readmission Within the Last 30 Days no previous admission in last 30 days    Equipment Currently Used at Home walker, rolling;cane, straight;bp cuff;cpap;commode;shower chair    Concerns to be Addressed discharge planning    Anticipated Changes Related to Illness none    Equipment Needed After Discharge none                   Discharge Plan       Row Name 08/11/23 4846       Plan    Plan Home with brother.    Patient/Family in Agreement with Plan yes    Plan Comments Patient lives at home with brother. Patient drives, brother or cousin will transport at discharge. Patient performs ADL. PCP and pharmacy confirmed. Denies financial assistance needs for medication and/or food. Denies HH and/or rehab needs.                  Demographic Summary       Row Name 08/11/23 1348       General Information    Admission Type observation    Arrived From emergency department    Required Notices Provided Observation Status Notice    Referral Source admission list    Reason for  Consult discharge planning    Preferred Language English                   Functional Status       Row Name 08/11/23 1348       Functional Status    Usual Activity Tolerance good    Current Activity Tolerance good       Functional Status, IADL    Medications independent    Meal Preparation independent    Housekeeping independent    Laundry independent    Shopping independent             Faith Iraheta RN, BSN  3A/2A   [address] [address]  Phone: [phone]  Fax: [phone]

## 2023-08-11 NOTE — CONSULTS
[facility] CONSULT      Patient Identification:  NAME:  Mona Sheffield  Age:  67 y.o.   Sex:  female   :  1955   MRN:  [id number]       Chief complaint/Reason for consult: Retention    History of present illness:  67 y.o. female came to the ER for dysuria urgency and frequency and suprapubic pain.  Found to be in retention about 700 cc so Owen placed, creatinine down now.  Culture growing mixed brittany      Past medical history:  Past Medical History:   Diagnosis Date    Arthritis     Asthma     Depression     Hypertension     Injury of back     Pneumonia        Past surgical history:  Past Surgical History:   Procedure Laterality Date    APPENDECTOMY      CHOLECYSTECTOMY      HYSTERECTOMY      SPINAL CORD STIMULATOR TRIAL W/ LAMINOTOMY      TONSILLECTOMY         Allergies:  Patient has no known allergies.    Home medications:  Medications Prior to Admission   Medication Sig Dispense Refill Last Dose    albuterol (PROVENTIL) (2.5 MG/3ML) 0.083% nebulizer solution Take 2.5 mg by nebulization Every 4 (Four) Hours As Needed for Wheezing. 180 mL 0 8/10/2023    albuterol sulfate  (90 Base) MCG/ACT inhaler albuterol sulfate HFA 90 mcg/actuation aerosol inhaler   INHALE 2 PUFFS BY MOUTH EVERY 6 HOURS AS NEEDED   Past Week    atenolol (TENORMIN) 100 MG tablet TAKE 1 TABLET BY MOUTH EVERY DAY 90 tablet 0 Past Week    atorvastatin (LIPITOR) 40 MG tablet Take 1 tablet by mouth Daily for 90 days. 90 tablet 1 Past Week    baclofen (LIORESAL) 10 MG tablet TAKE 1 TABLET BY MOUTH EVERY NIGHT 30 tablet 1 Past Week    Budeson-Glycopyrrol-Formoterol (Breztri Aerosphere) 160-9-4.8 MCG/ACT aerosol inhaler Inhale 2 puffs 2 (Two) Times a Day. 8 g 1 Past Week    buPROPion XL (WELLBUTRIN XL) 150 MG 24 hr tablet TAKE 1 TABLET BY MOUTH DAILY 30 tablet 1 Past Week    cloNIDine (CATAPRES) 0.2 MG tablet TAKE 1 TABLET BY MOUTH TWICE DAILY 180 tablet 1 Past Week    dexlansoprazole (DEXILANT) 60 MG capsule Take 1 capsule by  mouth Daily.   Past Week    Diclofenac Sodium (VOLTAREN) 1 % gel gel Apply 4 g topically to the appropriate area as directed 4 (Four) Times a Day As Needed (hand pain). 60 g 2 Past Week    fluticasone (FLONASE) 50 MCG/ACT nasal spray fluticasone propionate 50 mcg/actuation nasal spray,suspension   Past Week    hydrALAZINE (APRESOLINE) 25 MG tablet TAKE 1 TABLET BY MOUTH THREE TIMES DAILY 90 tablet 0 Past Week    hydrOXYzine pamoate (VISTARIL) 25 MG capsule Take 1 capsule by mouth 3 (Three) Times a Day As Needed for Anxiety. 30 capsule 0 Past Week    ibuprofen (ADVIL,MOTRIN) 800 MG tablet Take 1 tablet by mouth Every 8 (Eight) Hours As Needed for Moderate Pain. 90 tablet 0 Past Week    lidocaine (LIDODERM) 5 % Lidoderm 5 % topical patch   APPLY 1 PATCH BY TRANSDERMAL ROUTE ONCE DAILY (MAY WEAR UP TO 12HOURS.)   Past Week    Lidocaine, Anorectal, (LMX 5) 5 % cream cream Apply nightly as needed. 60 g 1 Past Week    losartan-hydrochlorothiazide (HYZAAR) 100-25 MG per tablet TAKE 1 TABLET BY MOUTH DAILY 90 tablet 0 Past Week    memantine (NAMENDA) 5 MG tablet TAKE 1 TABLET BY MOUTH DAILY 90 tablet 1 Past Week    montelukast (Singulair) 10 MG tablet Take 1 tablet by mouth Every Night. 30 tablet 1 Past Week    oxyCODONE-acetaminophen (PERCOCET)  MG per tablet Take 1 tablet by mouth 2 (Two) Times a Day As Needed for Moderate Pain. 60 tablet 0 Past Week    sertraline (ZOLOFT) 100 MG tablet TAKE 1 AND 1/2 TABLETS BY MOUTH DAILY 135 tablet 0 Past Week    SUMAtriptan (Imitrex) 25 MG tablet Take 1 tablet by mouth 1 (One) Time As Needed for Migraine for up to 1 dose. Take one tablet at onset of headache. May repeat dose one time in 2 hours if headache not relieved. 8 tablet 0 Past Month    traZODone (DESYREL) 50 MG tablet TAKE 1 TABLET BY MOUTH EVERY NIGHT 30 tablet 1 8/10/2023    triamcinolone (KENALOG) 0.1 % cream Apply 1 application  topically to the appropriate area as directed 2 (Two) Times a Day. 60 g 1 Past Week         Hospital medications:  atenolol, 100 mg, Oral, Daily  atorvastatin, 40 mg, Oral, Nightly  buPROPion XL, 150 mg, Oral, Daily  cefTRIAXone, 1,000 mg, Intravenous, Nightly  cloNIDine, 0.2 mg, Oral, BID  hydrALAZINE, 25 mg, Oral, TID  memantine, 5 mg, Oral, Daily  montelukast, 10 mg, Oral, Nightly  pantoprazole, 40 mg, Oral, BID AC  potassium chloride ER, 40 mEq, Oral, Q4H  senna-docusate sodium, 2 tablet, Oral, BID  sertraline, 150 mg, Oral, Daily  sodium chloride, 10 mL, Intravenous, Q12H  traZODone, 50 mg, Oral, Nightly      lactated ringers, 100 mL/hr, Last Rate: 100 mL/hr (23 1124)        acetaminophen    albuterol    senna-docusate sodium **AND** polyethylene glycol **AND** bisacodyl **AND** bisacodyl    melatonin    Morphine    ondansetron    oxyCODONE    Potassium Replacement - Follow Nurse / BPA Driven Protocol    [COMPLETED] Insert Peripheral IV **AND** sodium chloride    sodium chloride    sodium chloride    Family history:  Family History   Problem Relation Age of Onset    Hypertension Mother     Heart failure Mother     Hypertension Father     Heart disease Father     Heart failure Brother        Social history:  Social History     Tobacco Use    Smoking status: Every Day     Packs/day: 0.50     Years: 50.00     Pack years: 25.00     Types: Cigarettes    Smokeless tobacco: Never   Vaping Use    Vaping Use: Never used   Substance Use Topics    Alcohol use: Yes     Alcohol/week: 2.0 standard drinks     Types: 1 Glasses of wine, 1 Cans of beer per week     Comment: OCC    Drug use: Yes     Types: Marijuana     Comment: medical marijuana           Objective:  TMax 24 hours:   Temp (24hrs), Av.3 øF (36.8 øC), Min:97.6 øF (36.4 øC), Max:99.4 øF (37.4 øC)      Vitals Ranges:   Temp:  [97.6 øF (36.4 øC)-99.4 øF (37.4 øC)] 97.6 øF (36.4 øC)  Heart Rate:  [50-89] 53  Resp:  [14-17] 14  BP: ()/(54-89) 106/69    Intake/Output Last 3 shifts:  I/O last 3 completed shifts:  In: 518.3 [I.V.:418.3; IV  Piggyback:100]  Out: 100 [Urine:100]     Physical Exam:    General Appearance:    Alert, cooperative, NAD   HEENT:    No trauma, hearing intact   Lungs:     Respirations unlabored, no audible wheezing    Heart:    No cyanosis, No significant edema   Abdomen:     Soft, ND    :    No suprapubic distention or tenderness, Owen clear   Extremities:   No significant edema, no deformity   Lymphatic:   No neck or groin LAD   Skin:   No bleeding, bruising or rashes   Neuro/Psych:   Mood/affect pleasant, no focal findings       Results review:   I reviewed the patient's new clinical results.    Data review:  Lab Results (last 24 hours)       Procedure Component Value Units Date/Time    Urine Culture - Urine, Urine, Clean Catch [[id number]] Collected: 08/10/23 2015    Specimen: Urine, Clean Catch Updated: 08/11/23 1320     Urine Culture 25,000 CFU/mL Mixed Debbie Isolated    Narrative:      Specimen contains mixed organisms of questionable pathogenicity suggestive of contamination. If symptoms persist, suggest recollection.  Colonization of the urinary tract without infection is common. Treatment is discouraged unless the patient is symptomatic, pregnant, or undergoing an invasive urologic procedure.    Basic Metabolic Panel [[id number]]  (Abnormal) Collected: 08/11/23 0455    Specimen: Blood from Hand, Left Updated: 08/11/23 0556     Glucose 110 mg/dL      BUN 42 mg/dL      Creatinine 1.05 mg/dL      Sodium 141 mmol/L      Potassium 3.3 mmol/L      Chloride 104 mmol/L      CO2 27.0 mmol/L      Calcium 8.4 mg/dL      BUN/Creatinine Ratio 40.0     Anion Gap 10.0 mmol/L      eGFR 58.7 mL/min/1.73     Narrative:      GFR Normal >60  Chronic Kidney Disease <60  Kidney Failure <15      CBC Auto Differential [[id number]]  (Abnormal) Collected: 08/11/23 0455    Specimen: Blood from Hand, Left Updated: 08/11/23 0531     WBC 8.70 10*3/mm3      RBC 3.92 10*6/mm3      Hemoglobin 11.5 g/dL      Hematocrit 34.3 %      MCV 87.5 fL      MCH  29.4 pg      MCHC 33.6 g/dL      RDW 13.5 %      RDW-SD 43.3 fl      MPV 10.0 fL      Platelets 150 10*3/mm3      Neutrophil % 63.5 %      Lymphocyte % 24.3 %      Monocyte % 10.2 %      Eosinophil % 1.6 %      Basophil % 0.4 %      Neutrophils, Absolute 5.50 10*3/mm3      Lymphocytes, Absolute 2.10 10*3/mm3      Monocytes, Absolute 0.90 10*3/mm3      Eosinophils, Absolute 0.10 10*3/mm3      Basophils, Absolute 0.00 10*3/mm3      nRBC 0.1 /100 WBC     POC Lactate [[id number]]  (Normal) Collected: 08/10/23 2227    Specimen: Blood Updated: 08/10/23 2229     Lactate 0.4 mmol/L      Comment: Serial Number: [id number]:  [id number]       Blood Culture - Blood, Arm, Right [[id number]] Collected: 08/10/23 2220    Specimen: Blood from Arm, Right Updated: 08/10/23 2224    Blood Culture - Blood, Arm, Left [[id number]] Collected: 08/10/23 2220    Specimen: Blood from Arm, Left Updated: 08/10/23 2224    POC Glucose Once [[id number]]  (Abnormal) Collected: 08/10/23 2052    Specimen: Blood Updated: 08/10/23 2054     Glucose 110 mg/dL      Comment: Serial Number: [id number]:  [id number]       Urinalysis, Microscopic Only - Urine, Clean Catch [[id number]]  (Abnormal) Collected: 08/10/23 2015    Specimen: Urine, Clean Catch Updated: 08/10/23 2027     RBC, UA 31-50 /HPF      WBC, UA Too Numerous to Count /HPF      Bacteria, UA 4+ /HPF      Squamous Epithelial Cells, UA 7-12 /HPF      Hyaline Casts, UA 0-2 /LPF      Methodology Automated Microscopy    Urinalysis With Culture If Indicated - Urine, Clean Catch [[id number]]  (Abnormal) Collected: 08/10/23 2015    Specimen: Urine, Clean Catch Updated: 08/10/23 2027     Color, UA Yellow     Appearance, UA Turbid     pH, UA 5.5     Specific Gravity, UA 1.012     Glucose, UA Negative     Ketones, UA Negative     Bilirubin, UA Negative     Blood, UA Moderate (2+)     Protein,  mg/dL (2+)     Leuk Esterase, UA Large (3+)     Nitrite, UA Negative     Urobilinogen, UA 1.0 E.U./dL     Narrative:      In absence of clinical symptoms, the presence of pyuria, bacteria, and/or nitrites on the urinalysis result does not correlate with infection.    CBC & Differential [[id number]]  (Abnormal) Collected: 08/10/23 1856    Specimen: Blood Updated: 08/10/23 2002    Narrative:      The following orders were created for panel order CBC & Differential.  Procedure                               Abnormality         Status                     ---------                               -----------         ------                     CBC Auto Differential[[id number]]        Abnormal            Final result                 Please view results for these tests on the individual orders.    CBC Auto Differential [[id number]]  (Abnormal) Collected: 08/10/23 1856    Specimen: Blood Updated: 08/10/23 2002     WBC 10.30 10*3/mm3      RBC 4.49 10*6/mm3      Hemoglobin 13.1 g/dL      Hematocrit 39.2 %      MCV 87.3 fL      MCH 29.3 pg      MCHC 33.6 g/dL      RDW 13.5 %      RDW-SD 43.3 fl      MPV 10.0 fL      Platelets 195 10*3/mm3      Neutrophil % 76.8 %      Lymphocyte % 13.5 %      Monocyte % 8.5 %      Eosinophil % 0.7 %      Basophil % 0.5 %      Neutrophils, Absolute 7.90 10*3/mm3      Lymphocytes, Absolute 1.40 10*3/mm3      Monocytes, Absolute 0.90 10*3/mm3      Eosinophils, Absolute 0.10 10*3/mm3      Basophils, Absolute 0.00 10*3/mm3      nRBC 0.0 /100 WBC     [place] Draw [[id number]] Collected: 08/10/23 1856    Specimen: Blood Updated: 08/10/23 2001    Narrative:      The following orders were created for panel order [place] Draw.  Procedure                               Abnormality         Status                     ---------                               -----------         ------                     Green Top (Gel)[[id number]]                                  Final result               Lavender Top[[id number]]                                     Final result               Gold Top - SST[[id number]]                                    Final result               Light Blue Top[[id number]]                                   Final result                 Please view results for these tests on the individual orders.    Lavender Top [[id number]] Collected: 08/10/23 1856    Specimen: Blood Updated: 08/10/23 2001     Extra Tube hold for add-on     Comment: Auto resulted       Gold Top - SST [[id number]] Collected: 08/10/23 1856    Specimen: Blood Updated: 08/10/23 2001     Extra Tube Hold for add-ons.     Comment: Auto resulted.       Light Blue Top [[id number]] Collected: 08/10/23 1856    Specimen: Blood Updated: 08/10/23 2001     Extra Tube Hold for add-ons.     Comment: Auto resulted       Green Top (Gel) [[id number]] Collected: 08/10/23 1856    Specimen: Blood Updated: 08/10/23 2001     Extra Tube Hold for add-ons.     Comment: Auto resulted.       Basic Metabolic Panel [[id number]]  (Abnormal) Collected: 08/10/23 1856    Specimen: Blood Updated: 08/10/23 1947     Glucose 116 mg/dL      BUN 50 mg/dL      Creatinine 1.33 mg/dL      Sodium 139 mmol/L      Potassium 3.3 mmol/L      Chloride 99 mmol/L      CO2 28.0 mmol/L      Calcium 9.2 mg/dL      BUN/Creatinine Ratio 37.6     Anion Gap 12.0 mmol/L      eGFR 44.2 mL/min/1.73     Narrative:      GFR Normal >60  Chronic Kidney Disease <60  Kidney Failure <15               Imaging:  Imaging Results (Last 24 Hours)       ** No results found for the last 24 hours. **               Assessment:       UTI (urinary tract infection)      Retention 700 cc    Plan:     Culture result mixed brittany  Creatinine down status post Owen placement  Okay for void trial prior to discharge, if cannot void replace Owen and discharge with Owen in place we will do a void trial in the office next week  We will plan to see in the office in 1 week to check urinalysis and PVR    Tristin Parish MD  [facility]  [facility], [address]  [place] [address]  Office: [phone]  Available via Epic Secure  Chat  08/11/23  14:34 EDT

## 2023-08-11 NOTE — H&P
[facility] Observation Unit H&P    Patient Name: Mona Sheffield  : 1955  MRN: [id number]  Primary Care Physician: Teresa Fernandez APRN  Date of admission: 8/10/2023     Patient Care Team:  Teresa Fernandez APRN as PCP - General (Nurse Practitioner)  Thomas Roa MD as Consulting Physician (Cardiology)          Subjective   History Present Illness     Chief Complaint:   Chief Complaint   Patient presents with    Nausea    Weakness - Generalized         History of Present Illness    Obtained from admitting physician HPI on 8/10/2023:  67-year-old female states that for the last 1 week she has had increasing dysuria urgency and frequency and suprapubic discomfort. She states she did have a low-grade fever. She has had nausea without vomiting. She reports no flank pain or diarrhea or constipation. She reports no numbness or weakness in extremities. She reports a history of chronic back pain without change. She reports feeling generally weak.     23:  Patient confirms the HPI noted above including approximately 1 week history of increasing urinary urgency and frequency.  She notes that she has had some mild abdominal discomfort but no specific dysuria.  A subjective fever is also been present and she notes some nausea without vomiting as well but denies any dyspnea, chest pain, peripheral edema or palpitations.  Patient additionally reports that her urine became cloudy on the day of presentation as well.           ROS  Review of Systems   Constitutional: Positive for fever.   HENT: Negative.     Eyes: Negative.    Cardiovascular: Negative.    Respiratory: Negative.     Skin: Negative.    Musculoskeletal: Negative.    Gastrointestinal:  Positive for nausea.   Genitourinary:  Positive for frequency, pelvic pain and urgency.   Neurological: Negative.    Psychiatric/Behavioral: Negative.         Personal History     Past Medical History:   Past Medical History:   Diagnosis Date    Arthritis     Asthma      Depression     Hypertension     Injury of back     Pneumonia        Surgical History:      Past Surgical History:   Procedure Laterality Date    APPENDECTOMY      CHOLECYSTECTOMY      HYSTERECTOMY      SPINAL CORD STIMULATOR TRIAL W/ LAMINOTOMY      TONSILLECTOMY             Family History: family history includes Heart disease in her father; Heart failure in her brother and mother; Hypertension in her father and mother. Otherwise pertinent FHx was reviewed and unremarkable.     Social History:  reports that she has been smoking cigarettes. She has a 25.00 pack-year smoking history. She has never used smokeless tobacco. She reports current alcohol use of about 2.0 standard drinks per week. She reports current drug use. Drug: Marijuana.      Medications:  Prior to Admission medications    Medication Sig Start Date End Date Taking? Authorizing Provider   albuterol (PROVENTIL) (2.5 MG/3ML) 0.083% nebulizer solution Take 2.5 mg by nebulization Every 4 (Four) Hours As Needed for Wheezing. 7/11/23  Yes Teresa Fernandez APRN   albuterol sulfate  (90 Base) MCG/ACT inhaler albuterol sulfate HFA 90 mcg/actuation aerosol inhaler   INHALE 2 PUFFS BY MOUTH EVERY 6 HOURS AS NEEDED   Yes Emergency, Nurse Epic, RN   atenolol (TENORMIN) 100 MG tablet TAKE 1 TABLET BY MOUTH EVERY DAY 5/22/23  Yes Teresa Fernandez APRN   atorvastatin (LIPITOR) 40 MG tablet Take 1 tablet by mouth Daily for 90 days. 7/11/23 10/9/23 Yes Teresa Fernandez APRN   baclofen (LIORESAL) 10 MG tablet TAKE 1 TABLET BY MOUTH EVERY NIGHT 3/10/23  Yes Teresa Fernandez APRN   Budeson-Glycopyrrol-Formoterol (Breztri Aerosphere) 160-9-4.8 MCG/ACT aerosol inhaler Inhale 2 puffs 2 (Two) Times a Day. 7/11/23  Yes Teresa Fernandez APRN   buPROPion XL (WELLBUTRIN XL) 150 MG 24 hr tablet TAKE 1 TABLET BY MOUTH DAILY 1/24/23  Yes Teresa Fernandez APRN   cloNIDine (CATAPRES) 0.2 MG tablet TAKE 1 TABLET BY MOUTH TWICE DAILY 1/23/23  Yes  Teresa Fernandez APRN   dexlansoprazole (DEXILANT) 60 MG capsule Take 1 capsule by mouth Daily.   Yes Provider, MD Olman   Diclofenac Sodium (VOLTAREN) 1 % gel gel Apply 4 g topically to the appropriate area as directed 4 (Four) Times a Day As Needed (hand pain). 7/11/23  Yes Teresa Fernandez APRN   fluticasone (FLONASE) 50 MCG/ACT nasal spray fluticasone propionate 50 mcg/actuation nasal spray,suspension   Yes Emergency, Nurse Hiren, RN   hydrALAZINE (APRESOLINE) 25 MG tablet TAKE 1 TABLET BY MOUTH THREE TIMES DAILY 4/3/23  Yes Teresa Fernandez APRN   hydrOXYzine pamoate (VISTARIL) 25 MG capsule Take 1 capsule by mouth 3 (Three) Times a Day As Needed for Anxiety. 1/15/23  Yes Teresa Fernandez APRN   ibuprofen (ADVIL,MOTRIN) 800 MG tablet Take 1 tablet by mouth Every 8 (Eight) Hours As Needed for Moderate Pain. 3/15/23  Yes Teresa Fernandez APRN   lidocaine (LIDODERM) 5 % Lidoderm 5 % topical patch   APPLY 1 PATCH BY TRANSDERMAL ROUTE ONCE DAILY (MAY WEAR UP TO 12HOURS.)   Yes Emergency, Nurse Epic, RN   Lidocaine, Anorectal, (LMX 5) 5 % cream cream Apply nightly as needed. 8/30/22  Yes Teresa Fernandez APRN   losartan-hydrochlorothiazide (HYZAAR) 100-25 MG per tablet TAKE 1 TABLET BY MOUTH DAILY 6/9/23  Yes Teresa Fernandez APRN   memantine (NAMENDA) 5 MG tablet TAKE 1 TABLET BY MOUTH DAILY 1/23/23  Yes Teresa Fernandez APRN   montelukast (Singulair) 10 MG tablet Take 1 tablet by mouth Every Night. 2/14/23  Yes Teresa Fernandez APRN   oxyCODONE-acetaminophen (PERCOCET)  MG per tablet Take 1 tablet by mouth 2 (Two) Times a Day As Needed for Moderate Pain. 8/1/23  Yes Teresa Fernandez APRN   sertraline (ZOLOFT) 100 MG tablet TAKE 1 AND 1/2 TABLETS BY MOUTH DAILY 7/17/23  Yes Teresa Fernandez APRN   SUMAtriptan (Imitrex) 25 MG tablet Take 1 tablet by mouth 1 (One) Time As Needed for Migraine for up to 1 dose. Take one tablet at onset of headache. May repeat dose  one time in 2 hours if headache not relieved. 6/22/22  Yes Teresa Fernandez APRN   traZODone (DESYREL) 50 MG tablet TAKE 1 TABLET BY MOUTH EVERY NIGHT 4/10/23  Yes Teresa Fernandez APRN   triamcinolone (KENALOG) 0.1 % cream Apply 1 application  topically to the appropriate area as directed 2 (Two) Times a Day. 7/11/23  Yes Teresa Fernandez APRN       Allergies:  No Known Allergies    Objective   Objective     Vital Signs  Temp:  [97.9 øF (36.6 øC)-99.4 øF (37.4 øC)] 97.9 øF (36.6 øC)  Heart Rate:  [50-89] 51  Resp:  [14-17] 14  BP: ()/(54-89) 110/61  SpO2:  [88 %-98 %] 96 %  on  Flow (L/min):  [2] 2;   Device (Oxygen Therapy): room air  Body mass index is 29.24 kg/mý.    Physical Exam  Physical Exam  Constitutional:       General: She is not in acute distress.     Appearance: Normal appearance. She is normal weight. She is not ill-appearing, toxic-appearing or diaphoretic.   HENT:      Head: Normocephalic.      Right Ear: External ear normal.      Left Ear: External ear normal.      Nose: Nose normal.      Mouth/Throat:      Mouth: Mucous membranes are moist.   Eyes:      Extraocular Movements: Extraocular movements intact.   Cardiovascular:      Rate and Rhythm: Normal rate and regular rhythm.      Pulses: Normal pulses.   Pulmonary:      Effort: Pulmonary effort is normal.      Breath sounds: Normal breath sounds.   Abdominal:      General: Bowel sounds are normal.      Palpations: Abdomen is soft.   Musculoskeletal:      Cervical back: Normal range of motion.      Right lower leg: No edema.      Left lower leg: No edema.   Skin:     General: Skin is warm and dry.      Capillary Refill: Capillary refill takes less than 2 seconds.   Neurological:      General: No focal deficit present.      Mental Status: She is alert.   Psychiatric:         Mood and Affect: Mood normal.         Behavior: Behavior normal.         Thought Content: Thought content normal.         Judgment: Judgment normal.      Results Review:  I have personally reviewed most recent lab results and agree with findings, most notably: CBC, CMP, UA.    Results from last 7 days   Lab Units 08/11/23  0455   WBC 10*3/mm3 8.70   HEMOGLOBIN g/dL 11.5*   HEMATOCRIT % 34.3   PLATELETS 10*3/mm3 150     Results from last 7 days   Lab Units 08/11/23  0455 08/10/23  2227   SODIUM mmol/L 141  --    POTASSIUM mmol/L 3.3*  --    CHLORIDE mmol/L 104  --    CO2 mmol/L 27.0  --    BUN mg/dL 42*  --    CREATININE mg/dL 1.05*  --    GLUCOSE mg/dL 110*  --    CALCIUM mg/dL 8.4*  --    LACTATE mmol/L  --  0.4     Estimated Creatinine Clearance: 55 mL/min (A) (by C-G formula based on SCr of 1.05 mg/dL (H)).  Brief Urine Lab Results  (Last result in the past 365 days)        Color   Clarity   Blood   Leuk Est   Nitrite   Protein   CREAT   Urine HCG        08/10/23 2015 Yellow   Turbid   Moderate (2+)   Large (3+)   Negative   100 mg/dL (2+)                   Microbiology Results (last 10 days)       Procedure Component Value - Date/Time    Urine Culture - Urine, Urine, Clean Catch [[id number]] Collected: 08/10/23 2015    Lab Status: Final result Specimen: Urine, Clean Catch Updated: 08/11/23 1320     Urine Culture 25,000 CFU/mL Mixed Debbie Isolated    Narrative:      Specimen contains mixed organisms of questionable pathogenicity suggestive of contamination. If symptoms persist, suggest recollection.  Colonization of the urinary tract without infection is common. Treatment is discouraged unless the patient is symptomatic, pregnant, or undergoing an invasive urologic procedure.            ECG/EMG Results (most recent)       None                    No radiology results for the last 7 days      Estimated Creatinine Clearance: 55 mL/min (A) (by C-G formula based on SCr of 1.05 mg/dL (H)).    Assessment & Plan   Assessment/Plan       Active Hospital Problems    Diagnosis  POA    **UTI (urinary tract infection) [N39.0]  Yes      Resolved Hospital Problems   No  resolved problems to display.     Urinary tract infection  -WBCs: 8.70  -UA showed 4+ bacteria with WBCs too numerous to count, 31-50 RBCs, 2+ protein, 3+ leukocyte Estrace, negative nitrites and 2+ blood  -Owen catheter placed in the ED, continue  -Rocephin started in the ED, continue  -Urology consulted     LUZ        Lab Results   Component Value Date     CREATININE 1.05 (H) 08/11/2023     BUN 42 (H) 08/11/2023     BCR 40.0 (H) 08/11/2023     EGFR 58.7 (L) 08/11/2023   -Creatinine: 1.33 initially  -1 L fluid bolus followed by infusion given in ED  -Hold losartan-hydrochlorothiazide  -Avoid nephrotoxic medication IV dye unless urgently needed  -Monitor BMP and I's and O's while admitted     Hypokalemia        Lab Results   Component Value Date     K 3.3 (L) 08/11/2023   -Electrolyte replacement protocol ordered  -Monitor potassium and magnesium     COPD  -Symbicort, Singulair and DuoNeb     Hypertension  -Well controlled       BP Readings from Last 1 Encounters:   08/11/23 110/61   - Continue atenolol, hydralazine, clonidine  -Hold losartan hydrochlorothiazide due to LUZ  - Monitor while admitted     GERD  -PPI     Depression/anxiety/cognitive impairment  -Zoloft, Wellbutrin and Namenda            VTE Prophylaxis -   Mechanical Order History:        Ordered        08/11/23 0106  Place Sequential Compression Device  Once            08/11/23 0106  Maintain Sequential Compression Device  Continuous                          Pharmalogical Order History:       None            CODE STATUS:    Code Status and Medical Interventions:   Ordered at: 08/11/23 0101     Code Status (Patient has no pulse and is not breathing):    CPR (Attempt to Resuscitate)     Medical Interventions (Patient has pulse or is breathing):    Full Support       This patient has been examined wearing personal protective equipment.     I discussed the patient's findings and my recommendations with patient and nursing  staff.      Signature:Electronically signed by Brandt Hernandez PA-C, 08/11/23, 2:13 PM EDT.

## 2023-08-11 NOTE — ED NOTES
Nursing report ED to floor  Mona Sheffield  67 y.o.  female    HPI:   Chief Complaint   Patient presents with    Nausea    Weakness - Generalized       Admitting doctor:   Elias Mata MD    Admitting diagnosis:   The primary encounter diagnosis was Urinary tract infection without hematuria, site unspecified. A diagnosis of Urinary retention was also pertinent to this visit.    Code status:   Current Code Status       Date Active Code Status Order ID Comments User Context       8/11/2023 0101 CPR (Attempt to Resuscitate) [id number]  Elias Mata MD ED        Question Answer    Code Status (Patient has no pulse and is not breathing) CPR (Attempt to Resuscitate)    Medical Interventions (Patient has pulse or is breathing) Full Support                    Allergies:   Patient has no known allergies.    Isolation:  No active isolations     Fall Risk:  Fall Risk Assessment was completed, and patient is at low risk for falls.   Predictive Model Details         21 (Low) Factor Value    Calculated 8/10/2023 22:07 Age 67    Risk of Fall Model Musculoskeletal Assessment WDL     Active Peripheral IV Present     Skin Assessment WDL     Financial Class Other     Magnesium not on file     Diastolic BP 64     Respiratory Rate 17     Tobacco Use Current     Benito Scale not on file     Number of Distinct Medication Classes administered 2     Creatinine 1.33 mg/dL     Peripheral Vascular Assessment WDL     Chloride 99 mmol/L     Gastrointestinal Assessment WDL     Number of administrations of Analgesic Narcotics 1     Albumin not on file     Calcium 9.2 mg/dL     Cardiac Assessment WDL     Total Bilirubin not on file     Days after Admission 0.14     Potassium 3.3 mmol/L     ALT not on file         Weight:       08/10/23  1805   Weight: 81.6 kg (180 lb)       Intake and Output    Intake/Output Summary (Last 24 hours) at 8/11/2023 0111  Last data filed at 8/10/2023 2015  Gross per 24 hour   Intake --   Output 100 ml   Net  "-100 ml       Diet:   Dietary Orders (From admission, onward)       Start     Ordered    08/11/23 0107  NPO Diet NPO Type: Strict NPO  Diet Effective Now        Question:  NPO Type  Answer:  Strict NPO    08/11/23 0106                     Most recent vitals:   Vitals:    08/10/23 1805 08/10/23 1903 08/10/23 2117 08/10/23 2201   BP: 131/84 153/64 123/64 137/89   BP Location: Left arm Left arm     Patient Position: Sitting Lying     Pulse: 89 59 57 57   Resp: 17      Temp: 98.4 øF (36.9 øC)      TempSrc: Oral      SpO2: 98% 94% (!) 88% 96%   Weight: 81.6 kg (180 lb)      Height: 165.1 cm (65\")          Active LDAs/IV Access:   Lines, Drains & Airways       Active LDAs       Name Placement date Placement time Site Days    Peripheral IV 08/10/23 1855 Right Antecubital 08/10/23  1855  Antecubital  less than 1    Urethral Catheter Silicone 16 Fr. 08/10/23  2220  -- less than 1                    Skin Condition:   Skin Assessments (last day)       Date/Time Skin WDL    08/10/23 18:53:02 WDL             Labs (abnormal labs have a star):   Labs Reviewed   BASIC METABOLIC PANEL - Abnormal; Notable for the following components:       Result Value    Glucose 116 (*)     BUN 50 (*)     Creatinine 1.33 (*)     Potassium 3.3 (*)     BUN/Creatinine Ratio 37.6 (*)     eGFR 44.2 (*)     All other components within normal limits    Narrative:     GFR Normal >60  Chronic Kidney Disease <60  Kidney Failure <15     URINALYSIS W/ CULTURE IF INDICATED - Abnormal; Notable for the following components:    Appearance, UA Turbid (*)     Blood, UA Moderate (2+) (*)     Protein,  mg/dL (2+) (*)     Leuk Esterase, UA Large (3+) (*)     All other components within normal limits    Narrative:     In absence of clinical symptoms, the presence of pyuria, bacteria, and/or nitrites on the urinalysis result does not correlate with infection.   CBC WITH AUTO DIFFERENTIAL - Abnormal; Notable for the following components:    Neutrophil % 76.8 (*)     " Lymphocyte % 13.5 (*)     Neutrophils, Absolute 7.90 (*)     All other components within normal limits   URINALYSIS, MICROSCOPIC ONLY - Abnormal; Notable for the following components:    RBC, UA 31-50 (*)     WBC, UA Too Numerous to Count (*)     Bacteria, UA 4+ (*)     Squamous Epithelial Cells, UA 7-12 (*)     All other components within normal limits   POCT GLUCOSE FINGERSTICK - Abnormal; Notable for the following components:    Glucose 110 (*)     All other components within normal limits   POC LACTATE - Normal   URINE CULTURE   BLOOD CULTURE   BLOOD CULTURE   RAINBOW DRAW    Narrative:     The following orders were created for panel order [place] Draw.  Procedure                               Abnormality         Status                     ---------                               -----------         ------                     Green Top (Gel)[[id number]]                                  Final result               Lavender Top[[id number]]                                     Final result               Gold Top - SST[[id number]]                                   Final result               Light Blue Top[[id number]]                                   Final result                 Please view results for these tests on the individual orders.   BASIC METABOLIC PANEL   CBC WITH AUTO DIFFERENTIAL   POC LACTATE   GREEN TOP   LAVENDER TOP   GOLD TOP - SST   LIGHT BLUE TOP   CBC AND DIFFERENTIAL    Narrative:     The following orders were created for panel order CBC & Differential.  Procedure                               Abnormality         Status                     ---------                               -----------         ------                     CBC Auto Differential[[id number]]        Abnormal            Final result                 Please view results for these tests on the individual orders.       LOC: Person, Place, Time, and Situation    Telemetry:  Observation Unit    Cardiac Monitoring Ordered: no    EKG:   No orders  to display       Medications Given in the ED:   Medications   sodium chloride 0.9 % flush 10 mL (has no administration in time range)   potassium chloride (K-DUR,KLOR-CON) CR tablet 40 mEq (has no administration in time range)   traZODone (DESYREL) tablet 50 mg (has no administration in time range)   sertraline (ZOLOFT) tablet 150 mg (has no administration in time range)   montelukast (SINGULAIR) tablet 10 mg (has no administration in time range)   hydrALAZINE (APRESOLINE) tablet 25 mg (has no administration in time range)   cloNIDine (CATAPRES) tablet 0.2 mg (has no administration in time range)   atenolol (TENORMIN) tablet 100 mg (has no administration in time range)   albuterol (PROVENTIL) nebulizer solution 0.083% 2.5 mg/3mL (has no administration in time range)   sodium chloride 0.9 % flush 10 mL (has no administration in time range)   sodium chloride 0.9 % flush 10 mL (has no administration in time range)   sodium chloride 0.9 % infusion 40 mL (has no administration in time range)   acetaminophen (TYLENOL) tablet 650 mg (has no administration in time range)   sennosides-docusate (PERICOLACE) 8.6-50 MG per tablet 2 tablet (has no administration in time range)     And   polyethylene glycol (MIRALAX) packet 17 g (has no administration in time range)     And   bisacodyl (DULCOLAX) EC tablet 5 mg (has no administration in time range)     And   bisacodyl (DULCOLAX) suppository 10 mg (has no administration in time range)   ondansetron (ZOFRAN) injection 4 mg (has no administration in time range)   melatonin tablet 5 mg (has no administration in time range)   lactated ringers infusion (has no administration in time range)   morphine injection 4 mg (has no administration in time range)   sodium chloride 0.9 % bolus 1,000 mL (1,000 mL Intravenous New Bag 8/10/23 2220)   cefTRIAXone (ROCEPHIN) 2,000 mg in sodium chloride 0.9 % 100 mL IVPB (2,000 mg Intravenous New Bag 8/10/23 2220)   morphine injection 4 mg (4 mg  Intravenous Given 8/10/23 2054)   ondansetron (ZOFRAN) injection 4 mg (4 mg Intravenous Given 8/10/23 2054)       Imaging results:  No radiology results for the last day    Social issues:   Social History     Socioeconomic History    Marital status:    Tobacco Use    Smoking status: Every Day     Packs/day: 0.50     Years: 50.00     Pack years: 25.00     Types: Cigarettes    Smokeless tobacco: Never   Vaping Use    Vaping Use: Never used   Substance and Sexual Activity    Alcohol use: Yes     Alcohol/week: 2.0 standard drinks     Types: 1 Glasses of wine, 1 Cans of beer per week     Comment: OCC    Drug use: Yes     Types: Marijuana     Comment: medical marijuana       NIH Stroke Scale:  Interval: (not recorded)  1a. Level of Consciousness: (not recorded)  1b. LOC Questions: (not recorded)  1c. LOC Commands: (not recorded)  2. Best Gaze: (not recorded)  3. Visual: (not recorded)  4. Facial Palsy: (not recorded)  5a. Motor Arm, Left: (not recorded)  5b. Motor Arm, Right: (not recorded)  6a. Motor Leg, Left: (not recorded)  6b. Motor Leg, Right: (not recorded)  7. Limb Ataxia: (not recorded)  8. Sensory: (not recorded)  9. Best Language: (not recorded)  10. Dysarthria: (not recorded)  11. Extinction and Inattention (formerly Neglect): (not recorded)    Total (NIH Stroke Scale): (not recorded)     Additional notable assessment information:     Nursing report ED to floor:  [id number]    Alicia Mas RN   08/11/23 01:11 EDT

## 2023-08-12 ENCOUNTER — APPOINTMENT (OUTPATIENT)
Dept: GENERAL RADIOLOGY | Facility: HOSPITAL | Age: 68
End: 2023-08-12
Payer: MEDICARE

## 2023-08-12 LAB
ANION GAP SERPL CALCULATED.3IONS-SCNC: 7 MMOL/L (ref 5–15)
BASOPHILS # BLD AUTO: 0 10*3/MM3 (ref 0–0.2)
BASOPHILS NFR BLD AUTO: 0.6 % (ref 0–1.5)
BUN SERPL-MCNC: 27 MG/DL (ref 8–23)
BUN/CREAT SERPL: 37 (ref 7–25)
CALCIUM SPEC-SCNC: 8.7 MG/DL (ref 8.6–10.5)
CHLORIDE SERPL-SCNC: 107 MMOL/L (ref 98–107)
CO2 SERPL-SCNC: 27 MMOL/L (ref 22–29)
CREAT SERPL-MCNC: 0.73 MG/DL (ref 0.57–1)
DEPRECATED RDW RBC AUTO: 43.3 FL (ref 37–54)
EGFRCR SERPLBLD CKD-EPI 2021: 90.8 ML/MIN/1.73
EOSINOPHIL # BLD AUTO: 0.2 10*3/MM3 (ref 0–0.4)
EOSINOPHIL NFR BLD AUTO: 2.4 % (ref 0.3–6.2)
ERYTHROCYTE [DISTWIDTH] IN BLOOD BY AUTOMATED COUNT: 13.3 % (ref 12.3–15.4)
GLUCOSE SERPL-MCNC: 106 MG/DL (ref 65–99)
HCT VFR BLD AUTO: 33.4 % (ref 34–46.6)
HGB BLD-MCNC: 11.2 G/DL (ref 12–15.9)
LYMPHOCYTES # BLD AUTO: 2.3 10*3/MM3 (ref 0.7–3.1)
LYMPHOCYTES NFR BLD AUTO: 28.1 % (ref 19.6–45.3)
MCH RBC QN AUTO: 29.4 PG (ref 26.6–33)
MCHC RBC AUTO-ENTMCNC: 33.5 G/DL (ref 31.5–35.7)
MCV RBC AUTO: 87.7 FL (ref 79–97)
MONOCYTES # BLD AUTO: 0.7 10*3/MM3 (ref 0.1–0.9)
MONOCYTES NFR BLD AUTO: 9.2 % (ref 5–12)
NEUTROPHILS NFR BLD AUTO: 4.8 10*3/MM3 (ref 1.7–7)
NEUTROPHILS NFR BLD AUTO: 59.7 % (ref 42.7–76)
NRBC BLD AUTO-RTO: 0.1 /100 WBC (ref 0–0.2)
PLATELET # BLD AUTO: 146 10*3/MM3 (ref 140–450)
PMV BLD AUTO: 10 FL (ref 6–12)
POTASSIUM SERPL-SCNC: 4.4 MMOL/L (ref 3.5–5.2)
RBC # BLD AUTO: 3.81 10*6/MM3 (ref 3.77–5.28)
SODIUM SERPL-SCNC: 141 MMOL/L (ref 136–145)
WBC NRBC COR # BLD: 8 10*3/MM3 (ref 3.4–10.8)

## 2023-08-12 PROCEDURE — 94640 AIRWAY INHALATION TREATMENT: CPT

## 2023-08-12 PROCEDURE — 71045 X-RAY EXAM CHEST 1 VIEW: CPT

## 2023-08-12 PROCEDURE — 25010000002 MORPHINE PER 10 MG: Performed by: PHYSICIAN ASSISTANT

## 2023-08-12 PROCEDURE — 85025 COMPLETE CBC W/AUTO DIFF WBC: CPT | Performed by: EMERGENCY MEDICINE

## 2023-08-12 PROCEDURE — 96361 HYDRATE IV INFUSION ADD-ON: CPT

## 2023-08-12 PROCEDURE — 80048 BASIC METABOLIC PNL TOTAL CA: CPT | Performed by: EMERGENCY MEDICINE

## 2023-08-12 PROCEDURE — 94799 UNLISTED PULMONARY SVC/PX: CPT

## 2023-08-12 PROCEDURE — G0378 HOSPITAL OBSERVATION PER HR: HCPCS

## 2023-08-12 PROCEDURE — 96376 TX/PRO/DX INJ SAME DRUG ADON: CPT

## 2023-08-12 PROCEDURE — 25010000002 CEFTRIAXONE PER 250 MG: Performed by: PHYSICIAN ASSISTANT

## 2023-08-12 PROCEDURE — 25010000002 MORPHINE PER 10 MG: Performed by: EMERGENCY MEDICINE

## 2023-08-12 RX ORDER — MORPHINE SULFATE 2 MG/ML
2 INJECTION, SOLUTION INTRAMUSCULAR; INTRAVENOUS ONCE AS NEEDED
Status: COMPLETED | OUTPATIENT
Start: 2023-08-12 | End: 2023-08-12

## 2023-08-12 RX ADMIN — HYDRALAZINE HYDROCHLORIDE 25 MG: 25 TABLET, FILM COATED ORAL at 16:43

## 2023-08-12 RX ADMIN — Medication 10 ML: at 21:26

## 2023-08-12 RX ADMIN — TRAZODONE HYDROCHLORIDE 50 MG: 50 TABLET ORAL at 21:23

## 2023-08-12 RX ADMIN — ATORVASTATIN CALCIUM 40 MG: 40 TABLET, FILM COATED ORAL at 21:23

## 2023-08-12 RX ADMIN — Medication 10 ML: at 09:03

## 2023-08-12 RX ADMIN — OXYCODONE HYDROCHLORIDE 10 MG: 5 TABLET ORAL at 17:45

## 2023-08-12 RX ADMIN — ATENOLOL 100 MG: 50 TABLET ORAL at 09:00

## 2023-08-12 RX ADMIN — MONTELUKAST 10 MG: 10 TABLET, FILM COATED ORAL at 21:23

## 2023-08-12 RX ADMIN — PANTOPRAZOLE SODIUM 40 MG: 40 TABLET, DELAYED RELEASE ORAL at 06:45

## 2023-08-12 RX ADMIN — SODIUM CHLORIDE, POTASSIUM CHLORIDE, SODIUM LACTATE AND CALCIUM CHLORIDE 100 ML/HR: 600; 310; 30; 20 INJECTION, SOLUTION INTRAVENOUS at 09:12

## 2023-08-12 RX ADMIN — ALBUTEROL SULFATE 2.5 MG: 2.5 SOLUTION RESPIRATORY (INHALATION) at 18:50

## 2023-08-12 RX ADMIN — SENNOSIDES AND DOCUSATE SODIUM 2 TABLET: 50; 8.6 TABLET ORAL at 09:00

## 2023-08-12 RX ADMIN — CLONIDINE HYDROCHLORIDE 0.2 MG: 0.1 TABLET ORAL at 21:23

## 2023-08-12 RX ADMIN — CEFTRIAXONE 1000 MG: 1 INJECTION, POWDER, FOR SOLUTION INTRAMUSCULAR; INTRAVENOUS at 21:22

## 2023-08-12 RX ADMIN — BUPROPION HYDROCHLORIDE 150 MG: 150 TABLET, EXTENDED RELEASE ORAL at 09:00

## 2023-08-12 RX ADMIN — MORPHINE SULFATE 4 MG: 4 INJECTION, SOLUTION INTRAMUSCULAR; INTRAVENOUS at 03:27

## 2023-08-12 RX ADMIN — SERTRALINE HYDROCHLORIDE 150 MG: 100 TABLET ORAL at 09:00

## 2023-08-12 RX ADMIN — CLONIDINE HYDROCHLORIDE 0.2 MG: 0.1 TABLET ORAL at 08:59

## 2023-08-12 RX ADMIN — HYDRALAZINE HYDROCHLORIDE 25 MG: 25 TABLET, FILM COATED ORAL at 21:23

## 2023-08-12 RX ADMIN — MORPHINE SULFATE 2 MG: 2 INJECTION, SOLUTION INTRAMUSCULAR; INTRAVENOUS at 21:22

## 2023-08-12 RX ADMIN — MEMANTINE HYDROCHLORIDE 5 MG: 5 TABLET ORAL at 09:00

## 2023-08-12 RX ADMIN — HYDRALAZINE HYDROCHLORIDE 25 MG: 25 TABLET, FILM COATED ORAL at 09:00

## 2023-08-12 RX ADMIN — PANTOPRAZOLE SODIUM 40 MG: 40 TABLET, DELAYED RELEASE ORAL at 16:43

## 2023-08-12 RX ADMIN — MORPHINE SULFATE 4 MG: 4 INJECTION, SOLUTION INTRAMUSCULAR; INTRAVENOUS at 07:32

## 2023-08-12 NOTE — PLAN OF CARE
Problem: UTI (Urinary Tract Infection)  Goal: Improved Infection Symptoms  8/12/2023 0204 by Jenae Nayak, RN  Outcome: Ongoing, Progressing  8/12/2023 0203 by Jenae Nayak, ROMARIO  Outcome: Ongoing, Progressing     Problem: Pain Acute  Goal: Acceptable Pain Control and Functional Ability  8/12/2023 0204 by Jenae Nayak, RN  Outcome: Ongoing, Progressing  8/12/2023 0203 by Jenae Nayak, ROMARIO  Outcome: Ongoing, Progressing  Intervention: Optimize Psychosocial Wellbeing  Recent Flowsheet Documentation  Taken 8/11/2023 2000 by Jenae Nayak, RN  Diversional Activities:   television   smartphone   Goal Outcome Evaluation:

## 2023-08-12 NOTE — PLAN OF CARE
Problem: UTI (Urinary Tract Infection)  Goal: Improved Infection Symptoms  Outcome: Ongoing, Progressing     Problem: Pain Acute  Goal: Acceptable Pain Control and Functional Ability  Outcome: Ongoing, Progressing  Intervention: Optimize Psychosocial Wellbeing  Recent Flowsheet Documentation  Taken 8/11/2023 2000 by Jenae Nayak, RN  Diversional Activities:   television   smartphone   Goal Outcome Evaluation:

## 2023-08-12 NOTE — PLAN OF CARE
Goal Outcome Evaluation:  Plan of Care Reviewed With: patient        Progress: improving   Patient is voiding small amounts on her own since removal of jackson catheter. IV fluids Dc'd and CXR ordered for coarse lung sounds.

## 2023-08-12 NOTE — PROGRESS NOTES
"[facility] Progress Note    Patient Name: Mona Sheffield  : 1955  MRN: [id number]  Primary Care Physician: Teresa Fernandez APRN  Date of admission: 8/10/2023     Patient Care Team:  Teresa Fernandez APRN as PCP - General (Nurse Practitioner)  Thomas Roa MD as Consulting Physician (Cardiology)          Subjective   History Present Illness     Chief Complaint:   Chief Complaint   Patient presents with    Nausea    Weakness - Generalized         History of Present Illness  Obtained from admitting physician HPI on 8/10/2023:  67-year-old female states that for the last 1 week she has had increasing dysuria urgency and frequency and suprapubic discomfort. She states she did have a low-grade fever. She has had nausea without vomiting. She reports no flank pain or diarrhea or constipation. She reports no numbness or weakness in extremities. She reports a history of chronic back pain without change. She reports feeling generally weak.     23:  Patient confirms the HPI noted above including approximately 1 week history of increasing urinary urgency and frequency.  She notes that she has had some mild abdominal discomfort but no specific dysuria.  A subjective fever is also been present and she notes some nausea without vomiting as well but denies any dyspnea, chest pain, peripheral edema or palpitations.  Patient additionally reports that her urine became cloudy on the day of presentation as well.     2023  Patient reports that she is somewhat \"sore and stiff\" but denies any other new or acute complaints.  Urine is noted as draining into Owen catheter and is light yellow in Owen remains patent and draining    RN reported patient had called out stating she had had some visual hallucinations in her room as well as some confusion which have subsequently resolved.  No focal neurologic deficits were noted on exam and she was alert and oriented x3 when I spoke with patient.  She " "does report that this has \"happened to her before\" and review of records showed she had been seen by primary care physician for the same symptoms which were thought to be related to her sleeping medications.  She has had some episodes of bradycardia as well as hypoxia when sleeping and this was discussed with patient who denies any history of sleep apnea but when pressed further reports that she \"wears a breathing machine at night\".  Voiding trial was unsuccessful and discussed with patient the plans for replacement of catheter and discharged with antibiotics and follow-up with urology in the office.  Patient reports that she will not have transportation or way to get into her home until after 1030 when her brother gets off work.  We will monitor overnight and restart CPAP therapy with plan discharge in a.m.    ROS  Review of Systems   Constitutional: Positive for fever.   HENT: Negative.     Eyes: Negative.    Cardiovascular: Negative.    Respiratory: Negative.     Skin: Negative.    Musculoskeletal: Negative.    Gastrointestinal:  Positive for nausea.   Genitourinary:  Positive for frequency, pelvic pain and urgency.   Neurological: Negative.    Psychiatric/Behavioral: Negative.         Personal History     Past Medical History:   Past Medical History:   Diagnosis Date    Arthritis     Asthma     Depression     Hypertension     Injury of back     Pneumonia        Surgical History:      Past Surgical History:   Procedure Laterality Date    APPENDECTOMY      CHOLECYSTECTOMY      HYSTERECTOMY      SPINAL CORD STIMULATOR TRIAL W/ LAMINOTOMY      TONSILLECTOMY             Family History: family history includes Heart disease in her father; Heart failure in her brother and mother; Hypertension in her father and mother. Otherwise pertinent FHx was reviewed and unremarkable.     Social History:  reports that she has been smoking cigarettes. She has a 25.00 pack-year smoking history. She has never used smokeless tobacco. " She reports current alcohol use of about 2.0 standard drinks per week. She reports current drug use. Drug: Marijuana.      Medications:  Prior to Admission medications    Medication Sig Start Date End Date Taking? Authorizing Provider   albuterol (PROVENTIL) (2.5 MG/3ML) 0.083% nebulizer solution Take 2.5 mg by nebulization Every 4 (Four) Hours As Needed for Wheezing. 7/11/23  Yes Teresa Fernandez APRN   albuterol sulfate  (90 Base) MCG/ACT inhaler albuterol sulfate HFA 90 mcg/actuation aerosol inhaler   INHALE 2 PUFFS BY MOUTH EVERY 6 HOURS AS NEEDED   Yes Emergency, Nurse Hiren, RN   atenolol (TENORMIN) 100 MG tablet TAKE 1 TABLET BY MOUTH EVERY DAY 5/22/23  Yes Teresa Fernandez APRN   atorvastatin (LIPITOR) 40 MG tablet Take 1 tablet by mouth Daily for 90 days. 7/11/23 10/9/23 Yes Teresa Fernandez APRN   baclofen (LIORESAL) 10 MG tablet TAKE 1 TABLET BY MOUTH EVERY NIGHT 3/10/23  Yes Teresa Fernandez APRN   Budeson-Glycopyrrol-Formoterol (Breztri Aerosphere) 160-9-4.8 MCG/ACT aerosol inhaler Inhale 2 puffs 2 (Two) Times a Day. 7/11/23  Yes Teresa Fernandez APRN   buPROPion XL (WELLBUTRIN XL) 150 MG 24 hr tablet TAKE 1 TABLET BY MOUTH DAILY 1/24/23  Yes Teresa Fernandez APRN   cloNIDine (CATAPRES) 0.2 MG tablet TAKE 1 TABLET BY MOUTH TWICE DAILY 1/23/23  Yes Teresa Fernandez APRN   dexlansoprazole (DEXILANT) 60 MG capsule Take 1 capsule by mouth Daily.   Yes Provider, MD Olman   Diclofenac Sodium (VOLTAREN) 1 % gel gel Apply 4 g topically to the appropriate area as directed 4 (Four) Times a Day As Needed (hand pain). 7/11/23  Yes Teresa Fernandez APRN   fluticasone (FLONASE) 50 MCG/ACT nasal spray fluticasone propionate 50 mcg/actuation nasal spray,suspension   Yes Emergency, Nurse Hiren, RN   hydrALAZINE (APRESOLINE) 25 MG tablet TAKE 1 TABLET BY MOUTH THREE TIMES DAILY 4/3/23  Yes Teresa Fernandez APRN   hydrOXYzine pamoate (VISTARIL) 25 MG capsule Take 1 capsule by  mouth 3 (Three) Times a Day As Needed for Anxiety. 1/15/23  Yes Teresa Fernandze APRN   ibuprofen (ADVIL,MOTRIN) 800 MG tablet Take 1 tablet by mouth Every 8 (Eight) Hours As Needed for Moderate Pain. 3/15/23  Yes Teresa Fernandez APRN   lidocaine (LIDODERM) 5 % Lidoderm 5 % topical patch   APPLY 1 PATCH BY TRANSDERMAL ROUTE ONCE DAILY (MAY WEAR UP TO 12HOURS.)   Yes Emergency, Nurse Epic, RN   Lidocaine, Anorectal, (LMX 5) 5 % cream cream Apply nightly as needed. 8/30/22  Yes Teresa Fernandez APRN   losartan-hydrochlorothiazide (HYZAAR) 100-25 MG per tablet TAKE 1 TABLET BY MOUTH DAILY 6/9/23  Yes Teresa Fernandez APRN   memantine (NAMENDA) 5 MG tablet TAKE 1 TABLET BY MOUTH DAILY 1/23/23  Yes Teresa Fernandez APRN   montelukast (Singulair) 10 MG tablet Take 1 tablet by mouth Every Night. 2/14/23  Yes Teresa Fernandez APRN   oxyCODONE-acetaminophen (PERCOCET)  MG per tablet Take 1 tablet by mouth 2 (Two) Times a Day As Needed for Moderate Pain. 8/1/23  Yes Teresa Fernandez APRN   sertraline (ZOLOFT) 100 MG tablet TAKE 1 AND 1/2 TABLETS BY MOUTH DAILY 7/17/23  Yes Teresa Fernandez APRN   SUMAtriptan (Imitrex) 25 MG tablet Take 1 tablet by mouth 1 (One) Time As Needed for Migraine for up to 1 dose. Take one tablet at onset of headache. May repeat dose one time in 2 hours if headache not relieved. 6/22/22  Yes Teresa Fernandez APRN   traZODone (DESYREL) 50 MG tablet TAKE 1 TABLET BY MOUTH EVERY NIGHT 4/10/23  Yes Teresa Fernandez APRN   triamcinolone (KENALOG) 0.1 % cream Apply 1 application  topically to the appropriate area as directed 2 (Two) Times a Day. 7/11/23  Yes Teresa Fernandez APRN       Allergies:  No Known Allergies    Objective   Objective     Vital Signs  Temp:  [97.8 øF (36.6 øC)-98.3 øF (36.8 øC)] 98 øF (36.7 øC)  Heart Rate:  [52-70] 53  Resp:  [15-18] 18  BP: (102-145)/(66-90) 145/90  SpO2:  [93 %-98 %] 94 %  on  Flow (L/min):  [3] 3;   Device  (Oxygen Therapy): room air  Body mass index is 29.24 kg/mý.    Physical Exam  Physical Exam  Constitutional:       General: She is not in acute distress.     Appearance: Normal appearance. She is normal weight. She is not ill-appearing, toxic-appearing or diaphoretic.   HENT:      Head: Normocephalic.      Right Ear: External ear normal.      Left Ear: External ear normal.      Nose: Nose normal.      Mouth/Throat:      Mouth: Mucous membranes are moist.   Eyes:      Extraocular Movements: Extraocular movements intact.   Cardiovascular:      Rate and Rhythm: Normal rate and regular rhythm.      Pulses: Normal pulses.   Pulmonary:      Effort: Pulmonary effort is normal.      Breath sounds: Normal breath sounds.   Abdominal:      General: Bowel sounds are normal.      Palpations: Abdomen is soft.   Musculoskeletal:      Cervical back: Normal range of motion.      Right lower leg: No edema.      Left lower leg: No edema.   Skin:     General: Skin is warm and dry.      Capillary Refill: Capillary refill takes less than 2 seconds.   Neurological:      General: No focal deficit present.      Mental Status: She is alert.   Psychiatric:         Mood and Affect: Mood normal.         Behavior: Behavior normal.         Thought Content: Thought content normal.         Judgment: Judgment normal.     Results Review:  I have personally reviewed most recent lab results and radiology images and interpretations and agree with findings, most notably: Chest x-ray, CBC, CMP and UA.    Results from last 7 days   Lab Units 08/12/23  0335   WBC 10*3/mm3 8.00   HEMOGLOBIN g/dL 11.2*   HEMATOCRIT % 33.4*   PLATELETS 10*3/mm3 146     Results from last 7 days   Lab Units 08/12/23  0335 08/11/23  0455 08/10/23  2227   SODIUM mmol/L 141   < >  --    POTASSIUM mmol/L 4.4   < >  --    CHLORIDE mmol/L 107   < >  --    CO2 mmol/L 27.0   < >  --    BUN mg/dL 27*   < >  --    CREATININE mg/dL 0.73   < >  --    GLUCOSE mg/dL 106*   < >  --    CALCIUM  mg/dL 8.7   < >  --    LACTATE mmol/L  --   --  0.4    < > = values in this interval not displayed.     Estimated Creatinine Clearance: 79.1 mL/min (by C-G formula based on SCr of 0.73 mg/dL).  Brief Urine Lab Results  (Last result in the past 365 days)        Color   Clarity   Blood   Leuk Est   Nitrite   Protein   CREAT   Urine HCG        08/10/23 2015 Yellow   Turbid   Moderate (2+)   Large (3+)   Negative   100 mg/dL (2+)                   Microbiology Results (last 10 days)       Procedure Component Value - Date/Time    Blood Culture - Blood, Arm, Right [[id number]]  (Normal) Collected: 08/10/23 2220    Lab Status: Preliminary result Specimen: Blood from Arm, Right Updated: 08/11/23 2231     Blood Culture No growth at 24 hours    Blood Culture - Blood, Arm, Left [[id number]]  (Normal) Collected: 08/10/23 2220    Lab Status: Preliminary result Specimen: Blood from Arm, Left Updated: 08/11/23 2231     Blood Culture No growth at 24 hours    Urine Culture - Urine, Urine, Clean Catch [[id number]] Collected: 08/10/23 2015    Lab Status: Final result Specimen: Urine, Clean Catch Updated: 08/11/23 1320     Urine Culture 25,000 CFU/mL Mixed Debbie Isolated    Narrative:      Specimen contains mixed organisms of questionable pathogenicity suggestive of contamination. If symptoms persist, suggest recollection.  Colonization of the urinary tract without infection is common. Treatment is discouraged unless the patient is symptomatic, pregnant, or undergoing an invasive urologic procedure.            ECG/EMG Results (most recent)       None                    XR Chest 1 View    Result Date: 8/12/2023  1.Cardiomegaly. 2.Possible small left pleural effusion and left basilar opacities. There is prominence of the central pulmonary vasculature. Electronically Signed: Carter Self  8/12/2023 3:50 PM EDT  Workstation ID: [id number]       Estimated Creatinine Clearance: 79.1 mL/min (by C-G formula based on SCr of 0.73  "mg/dL).    Assessment & Plan   Assessment/Plan       Active Hospital Problems    Diagnosis  POA    **UTI (urinary tract infection) [N39.0]  Yes      Resolved Hospital Problems   No resolved problems to display.       Urinary tract infection  -WBCs: 8.70  -UA showed 4+ bacteria with WBCs too numerous to count, 31-50 RBCs, 2+ protein, 3+ leukocyte Estrace, negative nitrites and 2+ blood  -Owen catheter placed in the ED, continue  -Rocephin started in the ED, continue  -Urology consulted     LUZ        Lab Results   Component Value Date     CREATININE 0.73 08/12/2023     BUN 27 (H) 08/12/2023     BCR 37.0 (H) 08/12/2023     EGFR 90.8 08/12/2023   -Creatinine: 1.33 initially  -1 L fluid bolus followed by infusion given in ED  -Hold losartan-hydrochlorothiazide  -Avoid nephrotoxic medication IV dye unless urgently needed  -Monitor BMP and I's and O's while admitted     Hypokalemia            Lab Results   Component Value Date     K 3.3 (L) 08/11/2023   -Potassium: 4.4 on 8/12  -Electrolyte replacement protocol ordered  -Monitor potassium and magnesium    Hallucinations/confusion  -No focal neurologic deficits appreciated on exam and patient is currently ANO x3.  She does report that she \"has had this happen before and review of record shows patient has been evaluated by primary care physician in the past for some visual hallucinations thought to be related to medications and sleep  -We will attempt to taper opiate usage back to her home dosage  -Chest x-ray was ordered given the patient's hypoxia and bradycardia which showed cardiomegaly as well as a possible small left pleural effusion and left basilar opacity along with prominence of the central pulmonary vasculature with patient denying any respiratory symptoms at this time.  -Patient does have some bradycardia in her sleep and has also been noted as being somewhat restless sleeping and intermittent times throughout the day.  When questioned she did report that she " "uses a \"breathing machine\" at home  -Start CPAP therapy while admitted and encourage continued compliance at home CPAP  -Symbicort, Singulair and DuoNeb     Hypertension  -Well controlled         BP Readings from Last 1 Encounters:   08/11/23 110/61   - Continue atenolol, hydralazine, clonidine  -Hold losartan hydrochlorothiazide due to LUZ  - Monitor while admitted     GERD  -PPI     Depression/anxiety/cognitive impairment  -Zoloft, Wellbutrin and Namenda    MURTAZA  -CPAP          VTE Prophylaxis -   Mechanical Order History:        Ordered        08/11/23 0106  Place Sequential Compression Device  Once            08/11/23 0106  Maintain Sequential Compression Device  Continuous                          Pharmalogical Order History:       None            CODE STATUS:    Code Status and Medical Interventions:   Ordered at: 08/11/23 0101     Code Status (Patient has no pulse and is not breathing):    CPR (Attempt to Resuscitate)     Medical Interventions (Patient has pulse or is breathing):    Full Support       This patient has been examined wearing personal protective equipment.     I discussed the patient's findings and my recommendations with patient and nursing staff.      Signature:Electronically signed by Brandt Hernandez PA-C, 08/12/23, 4:49 PM EDT.     "

## 2023-08-13 ENCOUNTER — READMISSION MANAGEMENT (OUTPATIENT)
Dept: CALL CENTER | Facility: HOSPITAL | Age: 68
End: 2023-08-13
Payer: MEDICARE

## 2023-08-13 VITALS
HEART RATE: 67 BPM | OXYGEN SATURATION: 95 % | BODY MASS INDEX: 29.27 KG/M2 | RESPIRATION RATE: 16 BRPM | HEIGHT: 65 IN | DIASTOLIC BLOOD PRESSURE: 82 MMHG | WEIGHT: 175.71 LBS | SYSTOLIC BLOOD PRESSURE: 153 MMHG | TEMPERATURE: 99.8 F

## 2023-08-13 LAB
ANION GAP SERPL CALCULATED.3IONS-SCNC: 11 MMOL/L (ref 5–15)
BASOPHILS # BLD AUTO: 0.1 10*3/MM3 (ref 0–0.2)
BASOPHILS NFR BLD AUTO: 0.6 % (ref 0–1.5)
BUN SERPL-MCNC: 15 MG/DL (ref 8–23)
BUN/CREAT SERPL: 19.2 (ref 7–25)
CALCIUM SPEC-SCNC: 9.3 MG/DL (ref 8.6–10.5)
CHLORIDE SERPL-SCNC: 100 MMOL/L (ref 98–107)
CO2 SERPL-SCNC: 27 MMOL/L (ref 22–29)
CREAT SERPL-MCNC: 0.78 MG/DL (ref 0.57–1)
DEPRECATED RDW RBC AUTO: 40.7 FL (ref 37–54)
EGFRCR SERPLBLD CKD-EPI 2021: 83.9 ML/MIN/1.73
EOSINOPHIL # BLD AUTO: 0.2 10*3/MM3 (ref 0–0.4)
EOSINOPHIL NFR BLD AUTO: 1.5 % (ref 0.3–6.2)
ERYTHROCYTE [DISTWIDTH] IN BLOOD BY AUTOMATED COUNT: 13.1 % (ref 12.3–15.4)
GLUCOSE SERPL-MCNC: 129 MG/DL (ref 65–99)
HCT VFR BLD AUTO: 34.3 % (ref 34–46.6)
HGB BLD-MCNC: 11.4 G/DL (ref 12–15.9)
LYMPHOCYTES # BLD AUTO: 1.6 10*3/MM3 (ref 0.7–3.1)
LYMPHOCYTES NFR BLD AUTO: 14.7 % (ref 19.6–45.3)
MCH RBC QN AUTO: 29.3 PG (ref 26.6–33)
MCHC RBC AUTO-ENTMCNC: 33.2 G/DL (ref 31.5–35.7)
MCV RBC AUTO: 88.4 FL (ref 79–97)
MONOCYTES # BLD AUTO: 0.6 10*3/MM3 (ref 0.1–0.9)
MONOCYTES NFR BLD AUTO: 5.8 % (ref 5–12)
NEUTROPHILS NFR BLD AUTO: 77.4 % (ref 42.7–76)
NEUTROPHILS NFR BLD AUTO: 8.3 10*3/MM3 (ref 1.7–7)
NRBC BLD AUTO-RTO: 0 /100 WBC (ref 0–0.2)
PLATELET # BLD AUTO: 172 10*3/MM3 (ref 140–450)
PMV BLD AUTO: 9.4 FL (ref 6–12)
POTASSIUM SERPL-SCNC: 4 MMOL/L (ref 3.5–5.2)
RBC # BLD AUTO: 3.87 10*6/MM3 (ref 3.77–5.28)
SODIUM SERPL-SCNC: 138 MMOL/L (ref 136–145)
WBC NRBC COR # BLD: 10.7 10*3/MM3 (ref 3.4–10.8)

## 2023-08-13 PROCEDURE — 80048 BASIC METABOLIC PNL TOTAL CA: CPT | Performed by: PHYSICIAN ASSISTANT

## 2023-08-13 PROCEDURE — 85025 COMPLETE CBC W/AUTO DIFF WBC: CPT | Performed by: PHYSICIAN ASSISTANT

## 2023-08-13 PROCEDURE — G0378 HOSPITAL OBSERVATION PER HR: HCPCS

## 2023-08-13 PROCEDURE — 63710000001 ONDANSETRON ODT 4 MG TABLET DISPERSIBLE: Performed by: PHYSICIAN ASSISTANT

## 2023-08-13 RX ORDER — PHENAZOPYRIDINE HYDROCHLORIDE 100 MG/1
100 TABLET, FILM COATED ORAL
Status: DISCONTINUED | OUTPATIENT
Start: 2023-08-13 | End: 2023-08-13 | Stop reason: HOSPADM

## 2023-08-13 RX ORDER — ONDANSETRON 4 MG/1
4 TABLET, ORALLY DISINTEGRATING ORAL EVERY 6 HOURS PRN
Status: DISCONTINUED | OUTPATIENT
Start: 2023-08-13 | End: 2023-08-13 | Stop reason: HOSPADM

## 2023-08-13 RX ORDER — CEFDINIR 300 MG/1
300 CAPSULE ORAL 2 TIMES DAILY
Qty: 10 CAPSULE | Refills: 0 | Status: SHIPPED | OUTPATIENT
Start: 2023-08-13 | End: 2023-08-18

## 2023-08-13 RX ORDER — PHENAZOPYRIDINE HYDROCHLORIDE 100 MG/1
100 TABLET, FILM COATED ORAL
Qty: 6 TABLET | Refills: 0 | Status: SHIPPED | OUTPATIENT
Start: 2023-08-13 | End: 2023-08-15

## 2023-08-13 RX ORDER — CEFDINIR 300 MG/1
300 CAPSULE ORAL 2 TIMES DAILY
Qty: 10 CAPSULE | Refills: 0 | Status: SHIPPED | OUTPATIENT
Start: 2023-08-13 | End: 2023-08-13 | Stop reason: SDUPTHER

## 2023-08-13 RX ADMIN — MEMANTINE HYDROCHLORIDE 5 MG: 5 TABLET ORAL at 09:53

## 2023-08-13 RX ADMIN — PHENAZOPYRIDINE HYDROCHLORIDE 100 MG: 100 TABLET ORAL at 13:16

## 2023-08-13 RX ADMIN — ONDANSETRON 4 MG: 4 TABLET, ORALLY DISINTEGRATING ORAL at 09:15

## 2023-08-13 RX ADMIN — PHENAZOPYRIDINE HYDROCHLORIDE 100 MG: 100 TABLET ORAL at 09:53

## 2023-08-13 RX ADMIN — OXYCODONE HYDROCHLORIDE 10 MG: 5 TABLET ORAL at 09:53

## 2023-08-13 RX ADMIN — SENNOSIDES AND DOCUSATE SODIUM 2 TABLET: 50; 8.6 TABLET ORAL at 09:53

## 2023-08-13 RX ADMIN — OXYCODONE HYDROCHLORIDE 10 MG: 5 TABLET ORAL at 05:43

## 2023-08-13 RX ADMIN — ATENOLOL 100 MG: 50 TABLET ORAL at 09:52

## 2023-08-13 RX ADMIN — BUPROPION HYDROCHLORIDE 150 MG: 150 TABLET, EXTENDED RELEASE ORAL at 09:52

## 2023-08-13 RX ADMIN — CLONIDINE HYDROCHLORIDE 0.2 MG: 0.1 TABLET ORAL at 09:53

## 2023-08-13 RX ADMIN — SERTRALINE HYDROCHLORIDE 150 MG: 100 TABLET ORAL at 09:52

## 2023-08-13 RX ADMIN — HYDRALAZINE HYDROCHLORIDE 25 MG: 25 TABLET, FILM COATED ORAL at 09:53

## 2023-08-13 RX ADMIN — PANTOPRAZOLE SODIUM 40 MG: 40 TABLET, DELAYED RELEASE ORAL at 05:43

## 2023-08-13 NOTE — OUTREACH NOTE
Prep Survey      Flowsheet Row Responses   [facility] patient discharged from? [place]   Is LACE score < 7 ? Yes   Eligibility [facility]   Date of Admission 08/10/23   Date of Discharge 08/13/23   Discharge Disposition Home or Self Care   Discharge diagnosis Urinary urgency and frequencyUTI (urinary tract infection)   Does the patient have one of the following disease processes/diagnoses(primary or secondary)? Other   Does the patient have Home health ordered? No   Is there a DME ordered? No   Prep survey completed? Yes            NUHA MCFARLANE - Registered Nurse

## 2023-08-13 NOTE — DISCHARGE SUMMARY
"[facility]    Teresa Fernandez APRN    CHIEF COMPLAINT:     Urinary urgency and frequency    HISTORY OF PRESENT ILLNESS:    Obtained from admitting physician HPI on 8/10/2023:  67-year-old female states that for the last 1 week she has had increasing dysuria urgency and frequency and suprapubic discomfort. She states she did have a low-grade fever. She has had nausea without vomiting. She reports no flank pain or diarrhea or constipation. She reports no numbness or weakness in extremities. She reports a history of chronic back pain without change. She reports feeling generally weak.    08/11/23:  Patient confirms the HPI noted above including approximately 1 week history of increasing urinary urgency and frequency.  She notes that she has had some mild abdominal discomfort but no specific dysuria.  A subjective fever is also been present and she notes some nausea without vomiting as well but denies any dyspnea, chest pain, peripheral edema or palpitations.  Patient additionally reports that her urine became cloudy on the day of presentation as well.    8/12/2023  Patient reports that she is somewhat \"sore and stiff\" but denies any other new or acute complaints.  Urine is noted as draining into Owen catheter and is light yellow in Owen remains patent and draining     RN reported patient had called out stating she had had some visual hallucinations in her room as well as some confusion which have subsequently resolved.  No focal neurologic deficits were noted on exam and she was alert and oriented x3 when I spoke with patient.  She does report that this has \"happened to her before\" and review of records showed she had been seen by primary care physician for the same symptoms which were thought to be related to her sleeping medications.  She has had some episodes of bradycardia as well as hypoxia when sleeping and this was discussed with patient who denies any history of sleep apnea but " "when pressed further reports that she \"wears a breathing machine at night\".  Voiding trial was unsuccessful and discussed with patient the plans for replacement of catheter and discharged with antibiotics and follow-up with urology in the office.  Patient reports that she will not have transportation or way to get into her home until after 1030 when her brother gets off work.  We will monitor overnight and restart CPAP therapy with plan discharge in a.m.      8/13/2023:  Prior to replacement of catheter the previous day patient began to urinate spontaneously and repeat catheterization was not required.  She does note that she continues to urinate spontaneously but reports some dysuria.  Low-grade temperature of 100.6 was noted overnight and patient did report some nausea to RN on 8/13/2023.  Beyond that patient notes her chronic pain which is being managed at this time with her home oxycodone          Past Medical History:   Diagnosis Date    Arthritis     Asthma     Depression     Hypertension     Injury of back     Pneumonia      Past Surgical History:   Procedure Laterality Date    APPENDECTOMY      CHOLECYSTECTOMY      HYSTERECTOMY      SPINAL CORD STIMULATOR TRIAL W/ LAMINOTOMY      TONSILLECTOMY       Family History   Problem Relation Age of Onset    Hypertension Mother     Heart failure Mother     Hypertension Father     Heart disease Father     Heart failure Brother      Social History     Tobacco Use    Smoking status: Every Day     Packs/day: 0.50     Years: 50.00     Pack years: 25.00     Types: Cigarettes    Smokeless tobacco: Never   Vaping Use    Vaping Use: Never used   Substance Use Topics    Alcohol use: Yes     Alcohol/week: 2.0 standard drinks     Types: 1 Glasses of wine, 1 Cans of beer per week     Comment: OCC    Drug use: Yes     Types: Marijuana     Comment: medical marijuana     Medications Prior to Admission   Medication Sig Dispense Refill Last Dose    albuterol (PROVENTIL) (2.5 MG/3ML) " 0.083% nebulizer solution Take 2.5 mg by nebulization Every 4 (Four) Hours As Needed for Wheezing. 180 mL 0 8/10/2023    albuterol sulfate  (90 Base) MCG/ACT inhaler albuterol sulfate HFA 90 mcg/actuation aerosol inhaler   INHALE 2 PUFFS BY MOUTH EVERY 6 HOURS AS NEEDED   Past Week    atenolol (TENORMIN) 100 MG tablet TAKE 1 TABLET BY MOUTH EVERY DAY 90 tablet 0 Past Week    atorvastatin (LIPITOR) 40 MG tablet Take 1 tablet by mouth Daily for 90 days. 90 tablet 1 Past Week    baclofen (LIORESAL) 10 MG tablet TAKE 1 TABLET BY MOUTH EVERY NIGHT 30 tablet 1 Past Week    Budeson-Glycopyrrol-Formoterol (Breztri Aerosphere) 160-9-4.8 MCG/ACT aerosol inhaler Inhale 2 puffs 2 (Two) Times a Day. 8 g 1 Past Week    buPROPion XL (WELLBUTRIN XL) 150 MG 24 hr tablet TAKE 1 TABLET BY MOUTH DAILY 30 tablet 1 Past Week    cloNIDine (CATAPRES) 0.2 MG tablet TAKE 1 TABLET BY MOUTH TWICE DAILY 180 tablet 1 Past Week    dexlansoprazole (DEXILANT) 60 MG capsule Take 1 capsule by mouth Daily.   Past Week    Diclofenac Sodium (VOLTAREN) 1 % gel gel Apply 4 g topically to the appropriate area as directed 4 (Four) Times a Day As Needed (hand pain). 60 g 2 Past Week    fluticasone (FLONASE) 50 MCG/ACT nasal spray fluticasone propionate 50 mcg/actuation nasal spray,suspension   Past Week    hydrALAZINE (APRESOLINE) 25 MG tablet TAKE 1 TABLET BY MOUTH THREE TIMES DAILY 90 tablet 0 Past Week    hydrOXYzine pamoate (VISTARIL) 25 MG capsule Take 1 capsule by mouth 3 (Three) Times a Day As Needed for Anxiety. 30 capsule 0 Past Week    ibuprofen (ADVIL,MOTRIN) 800 MG tablet Take 1 tablet by mouth Every 8 (Eight) Hours As Needed for Moderate Pain. 90 tablet 0 Past Week    lidocaine (LIDODERM) 5 % Lidoderm 5 % topical patch   APPLY 1 PATCH BY TRANSDERMAL ROUTE ONCE DAILY (MAY WEAR UP TO 12HOURS.)   Past Week    Lidocaine, Anorectal, (LMX 5) 5 % cream cream Apply nightly as needed. 60 g 1 Past Week    losartan-hydrochlorothiazide (HYZAAR)  100-25 MG per tablet TAKE 1 TABLET BY MOUTH DAILY 90 tablet 0 Past Week    memantine (NAMENDA) 5 MG tablet TAKE 1 TABLET BY MOUTH DAILY 90 tablet 1 Past Week    montelukast (Singulair) 10 MG tablet Take 1 tablet by mouth Every Night. 30 tablet 1 Past Week    oxyCODONE-acetaminophen (PERCOCET)  MG per tablet Take 1 tablet by mouth 2 (Two) Times a Day As Needed for Moderate Pain. 60 tablet 0 Past Week    sertraline (ZOLOFT) 100 MG tablet TAKE 1 AND 1/2 TABLETS BY MOUTH DAILY 135 tablet 0 Past Week    SUMAtriptan (Imitrex) 25 MG tablet Take 1 tablet by mouth 1 (One) Time As Needed for Migraine for up to 1 dose. Take one tablet at onset of headache. May repeat dose one time in 2 hours if headache not relieved. 8 tablet 0 Past Month    traZODone (DESYREL) 50 MG tablet TAKE 1 TABLET BY MOUTH EVERY NIGHT 30 tablet 1 8/10/2023    triamcinolone (KENALOG) 0.1 % cream Apply 1 application  topically to the appropriate area as directed 2 (Two) Times a Day. 60 g 1 Past Week     Allergies:  Patient has no known allergies.    Immunization History   Administered Date(s) Administered    COVID-19 (MODERNA) 1st,2nd,3rd Dose Monovalent 03/26/2022, 04/23/2022    Fluzone High-Dose 65+yrs 09/27/2022    Pneumococcal Conjugate 20-Valent (PCV20) 09/27/2022           REVIEW OF SYSTEMS:    Review of Systems   Constitutional: Positive for fever.   HENT: Negative.     Eyes: Negative.    Cardiovascular: Negative.    Respiratory: Negative.     Skin: Negative.    Musculoskeletal: Negative.    Gastrointestinal:  Positive for nausea.   Genitourinary:  Positive for frequency, pelvic pain and urgency.   Neurological: Negative.    Psychiatric/Behavioral: Negative.         Vital Signs  Temp:  [97.7 øF (36.5 øC)-100.6 øF (38.1 øC)] 100.6 øF (38.1 øC)  Heart Rate:  [53-73] 73  Resp:  [16-19] 16  BP: (115-164)/(66-90) 164/88          Physical Exam:  Physical Exam  Constitutional:       General: She is not in acute distress.     Appearance: Normal  appearance. She is normal weight. She is not ill-appearing, toxic-appearing or diaphoretic.   HENT:      Head: Normocephalic.      Right Ear: External ear normal.      Left Ear: External ear normal.      Nose: Nose normal.      Mouth/Throat:      Mouth: Mucous membranes are moist.   Eyes:      Extraocular Movements: Extraocular movements intact.   Cardiovascular:      Rate and Rhythm: Normal rate and regular rhythm.      Pulses: Normal pulses.   Pulmonary:      Effort: Pulmonary effort is normal.      Breath sounds: Normal breath sounds.   Abdominal:      General: Bowel sounds are normal.      Palpations: Abdomen is soft.   Musculoskeletal:      Cervical back: Normal range of motion.      Right lower leg: No edema.      Left lower leg: No edema.   Skin:     General: Skin is warm and dry.      Capillary Refill: Capillary refill takes less than 2 seconds.   Neurological:      General: No focal deficit present.      Mental Status: She is alert.   Psychiatric:         Mood and Affect: Mood normal.         Behavior: Behavior normal.         Thought Content: Thought content normal.         Judgment: Judgment normal.         Emotional Behavior:   Normal   Debilities:  None  Results Review:    I reviewed the patient's new clinical results.  Lab Results (most recent)       Procedure Component Value Units Date/Time    Basic Metabolic Panel [[id number]]  (Abnormal) Collected: 08/11/23 0455    Specimen: Blood from Hand, Left Updated: 08/11/23 0556     Glucose 110 mg/dL      BUN 42 mg/dL      Creatinine 1.05 mg/dL      Sodium 141 mmol/L      Potassium 3.3 mmol/L      Chloride 104 mmol/L      CO2 27.0 mmol/L      Calcium 8.4 mg/dL      BUN/Creatinine Ratio 40.0     Anion Gap 10.0 mmol/L      eGFR 58.7 mL/min/1.73     Narrative:      GFR Normal >60  Chronic Kidney Disease <60  Kidney Failure <15      CBC Auto Differential [[id number]]  (Abnormal) Collected: 08/11/23 0455    Specimen: Blood from Hand, Left Updated: 08/11/23 0531      WBC 8.70 10*3/mm3      RBC 3.92 10*6/mm3      Hemoglobin 11.5 g/dL      Hematocrit 34.3 %      MCV 87.5 fL      MCH 29.4 pg      MCHC 33.6 g/dL      RDW 13.5 %      RDW-SD 43.3 fl      MPV 10.0 fL      Platelets 150 10*3/mm3      Neutrophil % 63.5 %      Lymphocyte % 24.3 %      Monocyte % 10.2 %      Eosinophil % 1.6 %      Basophil % 0.4 %      Neutrophils, Absolute 5.50 10*3/mm3      Lymphocytes, Absolute 2.10 10*3/mm3      Monocytes, Absolute 0.90 10*3/mm3      Eosinophils, Absolute 0.10 10*3/mm3      Basophils, Absolute 0.00 10*3/mm3      nRBC 0.1 /100 WBC     POC Lactate [[id number]]  (Normal) Collected: 08/10/23 2227    Specimen: Blood Updated: 08/10/23 2229     Lactate 0.4 mmol/L      Comment: Serial Number: [id number]:  [id number]       Blood Culture - Blood, Arm, Right [[id number]] Collected: 08/10/23 2220    Specimen: Blood from Arm, Right Updated: 08/10/23 2224    Blood Culture - Blood, Arm, Left [[id number]] Collected: 08/10/23 2220    Specimen: Blood from Arm, Left Updated: 08/10/23 2224    POC Glucose Once [[id number]]  (Abnormal) Collected: 08/10/23 2052    Specimen: Blood Updated: 08/10/23 2054     Glucose 110 mg/dL      Comment: Serial Number: [id number]:  [id number]       Urinalysis, Microscopic Only - Urine, Clean Catch [[id number]]  (Abnormal) Collected: 08/10/23 2015    Specimen: Urine, Clean Catch Updated: 08/10/23 2027     RBC, UA 31-50 /HPF      WBC, UA Too Numerous to Count /HPF      Bacteria, UA 4+ /HPF      Squamous Epithelial Cells, UA 7-12 /HPF      Hyaline Casts, UA 0-2 /LPF      Methodology Automated Microscopy    Urinalysis With Culture If Indicated - Urine, Clean Catch [[id number]]  (Abnormal) Collected: 08/10/23 2015    Specimen: Urine, Clean Catch Updated: 08/10/23 2027     Color, UA Yellow     Appearance, UA Turbid     pH, UA 5.5     Specific Gravity, UA 1.012     Glucose, UA Negative     Ketones, UA Negative     Bilirubin, UA Negative     Blood, UA Moderate (2+)      Protein,  mg/dL (2+)     Leuk Esterase, UA Large (3+)     Nitrite, UA Negative     Urobilinogen, UA 1.0 E.U./dL    Narrative:      In absence of clinical symptoms, the presence of pyuria, bacteria, and/or nitrites on the urinalysis result does not correlate with infection.    Urine Culture - Urine, Urine, Clean Catch [[id number]] Collected: 08/10/23 2015    Specimen: Urine, Clean Catch Updated: 08/10/23 2027    CBC & Differential [[id number]]  (Abnormal) Collected: 08/10/23 1856    Specimen: Blood Updated: 08/10/23 2002    Narrative:      The following orders were created for panel order CBC & Differential.  Procedure                               Abnormality         Status                     ---------                               -----------         ------                     CBC Auto Differential[[id number]]        Abnormal            Final result                 Please view results for these tests on the individual orders.    CBC Auto Differential [[id number]]  (Abnormal) Collected: 08/10/23 1856    Specimen: Blood Updated: 08/10/23 2002     WBC 10.30 10*3/mm3      RBC 4.49 10*6/mm3      Hemoglobin 13.1 g/dL      Hematocrit 39.2 %      MCV 87.3 fL      MCH 29.3 pg      MCHC 33.6 g/dL      RDW 13.5 %      RDW-SD 43.3 fl      MPV 10.0 fL      Platelets 195 10*3/mm3      Neutrophil % 76.8 %      Lymphocyte % 13.5 %      Monocyte % 8.5 %      Eosinophil % 0.7 %      Basophil % 0.5 %      Neutrophils, Absolute 7.90 10*3/mm3      Lymphocytes, Absolute 1.40 10*3/mm3      Monocytes, Absolute 0.90 10*3/mm3      Eosinophils, Absolute 0.10 10*3/mm3      Basophils, Absolute 0.00 10*3/mm3      nRBC 0.0 /100 WBC     [place] Draw [[id number]] Collected: 08/10/23 1856    Specimen: Blood Updated: 08/10/23 2001    Narrative:      The following orders were created for panel order [place] Draw.  Procedure                               Abnormality         Status                     ---------                                -----------         ------                     Green Top (Gel)[[id number]]                                  Final result               Lavender Top[[id number]]                                     Final result               Gold Top - SST[[id number]]                                   Final result               Light Blue Top[[id number]]                                   Final result                 Please view results for these tests on the individual orders.    Lavender Top [[id number]] Collected: 08/10/23 1856    Specimen: Blood Updated: 08/10/23 2001     Extra Tube hold for add-on     Comment: Auto resulted       Gold Top - SST [[id number]] Collected: 08/10/23 1856    Specimen: Blood Updated: 08/10/23 2001     Extra Tube Hold for add-ons.     Comment: Auto resulted.       Light Blue Top [[id number]] Collected: 08/10/23 1856    Specimen: Blood Updated: 08/10/23 2001     Extra Tube Hold for add-ons.     Comment: Auto resulted       Green Top (Gel) [[id number]] Collected: 08/10/23 1856    Specimen: Blood Updated: 08/10/23 2001     Extra Tube Hold for add-ons.     Comment: Auto resulted.       Basic Metabolic Panel [[id number]]  (Abnormal) Collected: 08/10/23 1856    Specimen: Blood Updated: 08/10/23 1947     Glucose 116 mg/dL      BUN 50 mg/dL      Creatinine 1.33 mg/dL      Sodium 139 mmol/L      Potassium 3.3 mmol/L      Chloride 99 mmol/L      CO2 28.0 mmol/L      Calcium 9.2 mg/dL      BUN/Creatinine Ratio 37.6     Anion Gap 12.0 mmol/L      eGFR 44.2 mL/min/1.73     Narrative:      GFR Normal >60  Chronic Kidney Disease <60  Kidney Failure <15              Imaging Results (Most Recent)       Procedure Component Value Units Date/Time    XR Chest 1 View [[id number]] Collected: 08/12/23 1549     Updated: 08/12/23 1553    Narrative:        XR CHEST 1 VW    Date of Exam: 8/12/2023 3:26 PM EDT    Indication: Increased respiratory effort    Comparison: None available.    FINDINGS:  There is prominence of the central pulmonary  vasculature. There is suspicion for a small left effusion. There appear to be mild left basilar opacities. No pneumothorax or significant right pleural effusion. The heart appears enlarged. Mediastinal contour   appears grossly normal. There is mild tortuosity of the aorta. Spinal stimulator electrodes project over the mid and lower thoracic spinal canal.      Impression:      1.Cardiomegaly.  2.Possible small left pleural effusion and left basilar opacities. There is prominence of the central pulmonary vasculature.      Electronically Signed: Carter Self    8/12/2023 3:50 PM EDT    Workstation ID: [id number]          not reviewed    ECG/EMG Results (most recent)       None          not reviewed            Microbiology Results (last 10 days)       Procedure Component Value - Date/Time    Blood Culture - Blood, Arm, Right [[id number]]  (Normal) Collected: 08/10/23 2220    Lab Status: Preliminary result Specimen: Blood from Arm, Right Updated: 08/12/23 2231     Blood Culture No growth at 2 days    Blood Culture - Blood, Arm, Left [[id number]]  (Normal) Collected: 08/10/23 2220    Lab Status: Preliminary result Specimen: Blood from Arm, Left Updated: 08/12/23 2231     Blood Culture No growth at 2 days    Urine Culture - Urine, Urine, Clean Catch [[id number]] Collected: 08/10/23 2015    Lab Status: Final result Specimen: Urine, Clean Catch Updated: 08/11/23 1320     Urine Culture 25,000 CFU/mL Mixed Debbie Isolated    Narrative:      Specimen contains mixed organisms of questionable pathogenicity suggestive of contamination. If symptoms persist, suggest recollection.  Colonization of the urinary tract without infection is common. Treatment is discouraged unless the patient is symptomatic, pregnant, or undergoing an invasive urologic procedure.            Assessment & Plan     UTI (urinary tract infection)     Urinary tract infection  -WBCs: 8.70  -UA showed 4+ bacteria with WBCs too numerous to count, 31-50 RBCs, 2+ protein,  "3+ leukocyte Estrace, negative nitrites and 2+ blood  -Owen catheter placed in the ED, continue  -Rocephin started in the ED, continue  -Urology consulted     LUZ  Lab Results   Component Value Date    CREATININE 0.78 08/13/2023    BUN 15 08/13/2023    BCR 19.2 08/13/2023    EGFR 83.9 08/13/2023   -Creatinine: 1.33 initially  -1 L fluid bolus followed by infusion given in ED  -Hold losartan-hydrochlorothiazide  -Avoid nephrotoxic medication IV dye unless urgently needed  -Monitor BMP and I's and O's while admitted    Hypokalemia        Lab Results   Component Value Date     K 3.3 (L) 08/11/2023   -Potassium: 4.4 on 8/12  -Electrolyte replacement protocol ordered  -Monitor potassium and magnesium    Hallucinations/confusion  -No focal neurologic deficits appreciated on exam and patient is currently ANO x3.  She does report that she \"has had this happen before and review of record shows patient has been evaluated by primary care physician in the past for some visual hallucinations thought to be related to medications and sleep  -We will attempt to taper opiate usage back to her home dosage  -Chest x-ray was ordered given the patient's hypoxia and bradycardia which showed cardiomegaly as well as a possible small left pleural effusion and left basilar opacity along with prominence of the central pulmonary vasculature with patient denying any respiratory symptoms at this time.  -Patient does have some bradycardia in her sleep and has also been noted as being somewhat restless sleeping and intermittent times throughout the day.  When questioned she did report that she uses a \"breathing machine\" at home  -Start CPAP therapy while admitted and encourage continued compliance at home CPAP  -Symbicort, Singulair and DuoNeb     COPD  -Symbicort, Singulair and DuoNeb     Hypertension  -Well controlled       BP Readings from Last 1 Encounters:   08/11/23 110/61   - Continue atenolol, hydralazine, clonidine  -Hold losartan " "hydrochlorothiazide due to LUZ  - Monitor while admitted     GERD  -PPI     Depression/anxiety/cognitive impairment  -Zoloft, Wellbutrin and Namenda  -Patient was noted to have some confusion as well as possible hallucinations while admitted which she reports has been evaluated outpatient.  No focal neurologic deficits were present and this was discussed with patient including potential for further evaluation or referral to psychiatry on an outpatient basis.  She notes that she would prefer to discuss this with her primary care provider    I discussed the patients findings and my recommendations with patient and nursing staff.     Discharge Diagnosis:      UTI (urinary tract infection)      Hospital Course  Patient is a 67 y.o. female presented with increased urinary frequency and urgency with an HPI noted above.  WBCs were found to be within normal limits at 8.70 however UA showed bacteria along with WBCs, RBCs, protein and leukocyte esterase.  Creatinine was initially elevated at 1.33.  Owen catheter was placed in the ED and she was started on Rocephin which was continued throughout her admission.  Additionally patient was given morphine as well as Zofran and fluid bolus followed by infusion.  Urology was consulted who recommended continuing antibiotics and Owen catheter with voiding trial on 8/12/2023.  Voiding trial was initially unsuccessful however she did eventually begin urinating spontaneously which she has continued to do with some dysuria but no other significant difficulty reported.  Patient was noted to have some confusion and visual hallucinations on the afternoon of 8/12 with RN reporting patient had used her phone to call the nurses station directly as she could not \"figure out how to use her call light\".  Evaluation of patient at that time showed patient who was alert and oriented x3 with no focal neurologic deficits noted.  When I discussed this with patient she does report that she has been " "evaluated for this in the past.  Review of records shows patient's primary care physician had evaluated patient for hallucinations previously which were felt to be related to medications she was receiving at that time.  During my exam at this incident patient's breath sounds were noted with slight rhonchi and chest x-ray was ordered which showed cardiomegaly with possible small left pleural effusion and left basilar opacities with prominence of the central pulmonary vasculature noted.  Some bradycardia has been present generally well patient was at rest.  Discussed this with patient who notes that she typically \"wears a breathing machine\" at home and was being placed on intermittent oxygen during her admission.  CPAP was ordered for the remainder of her admission and she is currently maintaining adequate oxygen saturations on room air.  A low-grade fever of 100.6 was noted on the morning of 8/13.  No specific treatment was given for this fever and repeat vital signs showed a temperature of 99.8.  Pyridium was also initiated due to her complaints of dysuria.  Creatinine has improved to 0.78 with a potassium of 4.0..  At this time patient is felt to be in good condition for discharge with close follow-up with her PCP as well as urology on an outpatient basis.  Her full testing/results and plan were discussed with patient along with concerning/alarm symptoms for which to call 911/return to the ED.  All questions were answered and she verbalizes her understanding and agreement.      Past Medical History:     Past Medical History:   Diagnosis Date    Arthritis     Asthma     Depression     Hypertension     Injury of back     Pneumonia        Past Surgical History:     Past Surgical History:   Procedure Laterality Date    APPENDECTOMY      CHOLECYSTECTOMY      HYSTERECTOMY      SPINAL CORD STIMULATOR TRIAL W/ LAMINOTOMY      TONSILLECTOMY         Social History:   Social History     Socioeconomic History    Marital " status:    Tobacco Use    Smoking status: Every Day     Packs/day: 0.50     Years: 50.00     Pack years: 25.00     Types: Cigarettes    Smokeless tobacco: Never   Vaping Use    Vaping Use: Never used   Substance and Sexual Activity    Alcohol use: Yes     Alcohol/week: 2.0 standard drinks     Types: 1 Glasses of wine, 1 Cans of beer per week     Comment: OCC    Drug use: Yes     Types: Marijuana     Comment: medical marijuana    Sexual activity: Defer       Procedures Performed         Consults:   Consults       Date and Time Order Name Status Description    8/11/2023  1:06 AM Inpatient Urology Consult Completed             Condition on Discharge:     Stable    Discharge Disposition      Discharge Medications     Discharge Medications        ASK your doctor about these medications        Instructions Start Date   albuterol sulfate  (90 Base) MCG/ACT inhaler  Commonly known as: PROVENTIL HFA;VENTOLIN HFA;PROAIR HFA   albuterol sulfate HFA 90 mcg/actuation aerosol inhaler   INHALE 2 PUFFS BY MOUTH EVERY 6 HOURS AS NEEDED      albuterol (2.5 MG/3ML) 0.083% nebulizer solution  Commonly known as: PROVENTIL   2.5 mg, Nebulization, Every 4 Hours PRN      atenolol 100 MG tablet  Commonly known as: TENORMIN   TAKE 1 TABLET BY MOUTH EVERY DAY      atorvastatin 40 MG tablet  Commonly known as: LIPITOR   40 mg, Oral, Daily      baclofen 10 MG tablet  Commonly known as: LIORESAL   10 mg, Oral, Nightly      Breztri Aerosphere 160-9-4.8 MCG/ACT aerosol inhaler  Generic drug: Budeson-Glycopyrrol-Formoterol   2 puffs, Inhalation, 2 Times Daily      buPROPion  MG 24 hr tablet  Commonly known as: WELLBUTRIN XL   150 mg, Oral, Daily      cloNIDine 0.2 MG tablet  Commonly known as: CATAPRES   TAKE 1 TABLET BY MOUTH TWICE DAILY      dexlansoprazole 60 MG capsule  Commonly known as: DEXILANT   60 mg, Oral, Daily      Diclofenac Sodium 1 % gel gel  Commonly known as: VOLTAREN   4 g, Topical, 4 Times Daily PRN       fluticasone 50 MCG/ACT nasal spray  Commonly known as: FLONASE   fluticasone propionate 50 mcg/actuation nasal spray,suspension      hydrALAZINE 25 MG tablet  Commonly known as: APRESOLINE   TAKE 1 TABLET BY MOUTH THREE TIMES DAILY      hydrOXYzine pamoate 25 MG capsule  Commonly known as: VISTARIL   25 mg, Oral, 3 Times Daily PRN      ibuprofen 800 MG tablet  Commonly known as: ADVIL,MOTRIN   800 mg, Oral, Every 8 Hours PRN      Lidocaine (Anorectal) 5 % cream cream  Commonly known as: LMX 5   Apply nightly as needed.      lidocaine 5 %  Commonly known as: LIDODERM   Lidoderm 5 % topical patch   APPLY 1 PATCH BY TRANSDERMAL ROUTE ONCE DAILY (MAY WEAR UP TO 12HOURS.)      losartan-hydrochlorothiazide 100-25 MG per tablet  Commonly known as: HYZAAR   1 tablet, Oral, Daily      memantine 5 MG tablet  Commonly known as: NAMENDA   5 mg, Oral, Daily      montelukast 10 MG tablet  Commonly known as: Singulair   10 mg, Oral, Nightly      oxyCODONE-acetaminophen  MG per tablet  Commonly known as: PERCOCET   1 tablet, Oral, 2 Times Daily PRN      sertraline 100 MG tablet  Commonly known as: ZOLOFT   TAKE 1 AND 1/2 TABLETS BY MOUTH DAILY      SUMAtriptan 25 MG tablet  Commonly known as: Imitrex   25 mg, Oral, Once As Needed, Take one tablet at onset of headache. May repeat dose one time in 2 hours if headache not relieved.      traZODone 50 MG tablet  Commonly known as: DESYREL   50 mg, Oral, Nightly      triamcinolone 0.1 % cream  Commonly known as: KENALOG   1 application , Topical, 2 Times Daily               Discharge Diet:     Activity at Discharge:     Follow-up Appointments  Future Appointments   Date Time Provider Department Center   11/17/2023  2:15 PM Teresa Fernandez APRN [facility]         Test Results Pending at Discharge  Pending Labs       Order Current Status    Blood Culture - Blood, Arm, Left Preliminary result    Blood Culture - Blood, Arm, Right Preliminary result             Risk for  Readmission (LACE) Score: 5 (8/13/2023  6:00 AM)      Greater than 30 minutes spent in discharge activities for this patient    Brandt Hernandez PA-C  08/13/23  09:58 EDT

## 2023-08-13 NOTE — PLAN OF CARE
Problem: Breathing Pattern Ineffective  Goal: Effective Breathing Pattern  Outcome: Ongoing, Progressing  Intervention: Promote Improved Breathing Pattern  Recent Flowsheet Documentation  Taken 8/12/2023 2000 by Jenae Nayak RN  Supportive Measures: active listening utilized  Head of Bed (HOB) Positioning: HOB at 20-30 degrees   Goal Outcome Evaluation:

## 2023-08-13 NOTE — PLAN OF CARE
Goal Outcome Evaluation:  Plan of Care Reviewed With: patient        Progress: improving  Outcome Evaluation: patient is improving. Voiding without difficulty. Patient is discharging to home with antibiotic. To follow up with Urology.

## 2023-08-14 ENCOUNTER — TELEPHONE (OUTPATIENT)
Dept: FAMILY MEDICINE CLINIC | Facility: CLINIC | Age: 68
End: 2023-08-14
Payer: MEDICARE

## 2023-08-14 ENCOUNTER — TRANSITIONAL CARE MANAGEMENT TELEPHONE ENCOUNTER (OUTPATIENT)
Dept: CALL CENTER | Facility: HOSPITAL | Age: 68
End: 2023-08-14
Payer: MEDICARE

## 2023-08-14 DIAGNOSIS — F32.A ANXIETY AND DEPRESSION: ICD-10-CM

## 2023-08-14 DIAGNOSIS — F41.9 ANXIETY AND DEPRESSION: Primary | ICD-10-CM

## 2023-08-14 DIAGNOSIS — F41.9 ANXIETY AND DEPRESSION: ICD-10-CM

## 2023-08-14 DIAGNOSIS — F32.A ANXIETY AND DEPRESSION: Primary | ICD-10-CM

## 2023-08-14 RX ORDER — HYDRALAZINE HYDROCHLORIDE 25 MG/1
TABLET, FILM COATED ORAL
Qty: 90 TABLET | Refills: 1 | Status: SHIPPED | OUTPATIENT
Start: 2023-08-14 | End: 2023-08-14

## 2023-08-14 RX ORDER — HYDRALAZINE HYDROCHLORIDE 25 MG/1
TABLET, FILM COATED ORAL
Qty: 270 TABLET | Refills: 0 | Status: SHIPPED | OUTPATIENT
Start: 2023-08-14

## 2023-08-14 NOTE — TELEPHONE ENCOUNTER
Rx Refill Note  Requested Prescriptions     Pending Prescriptions Disp Refills    hydrALAZINE (APRESOLINE) 25 MG tablet [Pharmacy Med Name: HYDRALAZINE  25MG TABLETS(ORANGE)] 270 tablet      Sig: TAKE 1 TABLET BY MOUTH THREE TIMES DAILY      Last office visit with prescribing clinician: 7/11/2023   Last telemedicine visit with prescribing clinician: Visit date not found   Next office visit with prescribing clinician: 11/17/2023                         Would you like a call back once the refill request has been completed: [] Yes [] No    If the office needs to give you a call back, can they leave a voicemail: [] Yes [] No    John Lu MA  08/14/23, 10:09 EDT

## 2023-08-14 NOTE — OUTREACH NOTE
Call Center TCM Note      Flowsheet Row Responses   [facility] facility patient discharged from? [facility]   Does the patient have one of the following disease processes/diagnoses(primary or secondary)? Other   TCM attempt successful? No   Unsuccessful attempts Attempt 1   Call Status Left message            Miriam Guerra RN    8/14/2023, 10:50 EDT

## 2023-08-14 NOTE — CASE MANAGEMENT/SOCIAL WORK
Case Management Discharge Note      Final Note: Routine home           Transportation Services  Private: Car    Final Discharge Disposition Code: 01 - home or self-care

## 2023-08-14 NOTE — TELEPHONE ENCOUNTER
Pharmacy Name:  GRETCHEN FROM       [id number] - [place], [place] - [address] AT [facility] [address] [address] - [phone] [facility] [phone] [facility] (Pharmacy) [phone]       What medication are you calling in regards to: HYDRALAZINE     What question does the pharmacy have: RX WRITTEN FOR ANXIETY, AND WANTED TO SEE IF APRN MEANT HYDROXYZINE INSTEAD?       Who is the provider that prescribed the medication:   ZULEYMA MARCUS    Additional notes:     hydrALAZINE (APRESOLINE) 25 MG tablet   0 ordered           Summary: TAKE 1 TABLET BY MOUTH THREE TIMES DAILY, Normal          OR     hydrOXYzine pamoate (VISTARIL) 25 MG capsule  25 mg, 3 Times Daily PRN 0 ordered           Summary: Take 1 capsule by mouth 3 (Three) Times a Day As Needed for Anxiety.

## 2023-08-14 NOTE — OUTREACH NOTE
Call Center TCM Note      Flowsheet Row Responses   [facility] patient discharged from? [facility]   Does the patient have one of the following disease processes/diagnoses(primary or secondary)? Other   TCM attempt successful? Yes   Call start time 1617   Call end time 1619   Discharge diagnosis Urinary urgency and frequencyUTI (urinary tract infection)   Meds reviewed with patient/caregiver? Yes   Is the patient having any side effects they believe may be caused by any medication additions or changes? No   Does the patient have all medications ordered at discharge? Yes   Is the patient taking all medications as directed (includes completed medication regime)? Yes   Does the patient have an appointment with their PCP within 7-14 days of discharge? No   Nursing Interventions Routed TCM call to PCP office  [Patient states she called the office to schedule her hospital follow up appointment and she was given the date of 9/6/23]   Has home health visited the patient within 72 hours of discharge? N/A   Psychosocial issues? No   Did the patient receive a copy of their discharge instructions? Yes   Nursing interventions Reviewed instructions with patient   What is the patient's perception of their health status since discharge? Same   Is the patient/caregiver able to teach back signs and symptoms related to disease process for when to call PCP? Yes   Is the patient/caregiver able to teach back signs and symptoms related to disease process for when to call 911? Yes   Is the patient/caregiver able to teach back the hierarchy of who to call/visit for symptoms/problems? PCP, Specialist, Home health nurse, Urgent Care, ED, 911 Yes   If the patient is a current smoker, are they able to teach back resources for cessation? [id number]   TCM call completed? Yes   Call end time 1619   Would this patient benefit from a Referral to [facility] Social Work? No   Is the patient interested in additional calls from an ambulatory ?  Jamilah Epps, SANIYA    8/14/2023, 16:20 EDT

## 2023-08-14 NOTE — TELEPHONE ENCOUNTER
Hydralazine is a blood pressure medicine, prescribed 3 times daily.  Hydroxyzine is used as needed for anxiety

## 2023-08-14 NOTE — TELEPHONE ENCOUNTER
Spoke with pharmacist to let her know it is for the blood pressure and that was the requested medication. She expressed understanding.

## 2023-08-15 LAB
BACTERIA SPEC AEROBE CULT: NORMAL
BACTERIA SPEC AEROBE CULT: NORMAL

## 2023-08-15 RX ORDER — ATORVASTATIN CALCIUM 40 MG/1
TABLET, FILM COATED ORAL
Qty: 90 TABLET | Refills: 1 | OUTPATIENT
Start: 2023-08-15

## 2023-08-17 RX ORDER — ATENOLOL 100 MG/1
TABLET ORAL
Qty: 90 TABLET | Refills: 0 | Status: SHIPPED | OUTPATIENT
Start: 2023-08-17

## 2023-08-21 ENCOUNTER — TELEPHONE (OUTPATIENT)
Dept: FAMILY MEDICINE CLINIC | Facility: CLINIC | Age: 68
End: 2023-08-21
Payer: MEDICARE

## 2023-08-21 NOTE — TELEPHONE ENCOUNTER
Is patient having any active sx of infection? Did she complete the first regimen and now sx are back?

## 2023-08-21 NOTE — TELEPHONE ENCOUNTER
Caller: SandravinnyMona    Relationship: Self    Best call back number: [phone] (Mobile)     What medication are you requesting:   phenazopyridine (PYRIDIUM) tablet 100 mg   AND  cefdinir (OMNICEF) 300 MG capsule     What are your current symptoms: BLADDER/KIDNEY INFECTION     HAS APPT BUT IN SEPTEMBER     How long have you been experiencing symptoms:     Have you had these symptoms before:    [x] Yes  [] No    Have you been treated for these symptoms before:   [x] Yes  [] No    If a prescription is needed, what is your preferred pharmacy and phone number: [id number] - [facility] [address] [address] AT [place] [address] - [phone]  - [phone]      Additional notes:    PRESCRIBED AT [facility] FOR BLADDER AND KIDNEY INFECTION     Date: 8/13/2023 Department: [facility] Ordering/Authorizing: Brandt Hernandez PA-C

## 2023-08-22 ENCOUNTER — CLINICAL SUPPORT (OUTPATIENT)
Dept: FAMILY MEDICINE CLINIC | Facility: CLINIC | Age: 68
End: 2023-08-22
Payer: MEDICARE

## 2023-08-22 DIAGNOSIS — R10.9 FLANK PAIN: Primary | ICD-10-CM

## 2023-08-22 LAB
BILIRUB BLD-MCNC: NEGATIVE MG/DL
CLARITY, POC: CLEAR
COLOR UR: YELLOW
GLUCOSE UR STRIP-MCNC: NEGATIVE MG/DL
KETONES UR QL: NEGATIVE
LEUKOCYTE EST, POC: NEGATIVE
NITRITE UR-MCNC: NEGATIVE MG/ML
PH UR: 6.5 [PH] (ref 5–8)
PROT UR STRIP-MCNC: NEGATIVE MG/DL
RBC # UR STRIP: NEGATIVE /UL
SP GR UR: 1.01 (ref 1–1.03)
UROBILINOGEN UR QL: NORMAL

## 2023-08-22 NOTE — TELEPHONE ENCOUNTER
Patient came in to leave a urine sample. Results sent in separate message. She said she cannot hold her bladder and is having a lot of back pain and leg pain. She stated that she feels awful. She is wondering what more to do because she feels worse now than she did when she went to the ER.

## 2023-08-22 NOTE — TELEPHONE ENCOUNTER
Spoke with Mona. States that she was given 5 days worth of medication previously which did not clear symptoms. She states that she is unable to control bladder and is using incontinence supplies currently She is also having burning. Asked if could come by today to provided a sample, patient agreed.

## 2023-08-22 NOTE — PROGRESS NOTES
Patient came in today to leave a urine sample because she continues to have UTI symptoms after completing antibiotic. Urine sample collected and sent to provider for review.    PIETRO Bear

## 2023-08-22 NOTE — PROGRESS NOTES
UA is negative.  Does patient have any lingering urinary symptoms?  Additional antibiotics are not indicated if symptoms have resolved

## 2023-08-23 ENCOUNTER — TELEPHONE (OUTPATIENT)
Dept: FAMILY MEDICINE CLINIC | Facility: CLINIC | Age: 68
End: 2023-08-23
Payer: MEDICARE

## 2023-08-23 DIAGNOSIS — R32 URINARY INCONTINENCE, UNSPECIFIED TYPE: ICD-10-CM

## 2023-08-23 DIAGNOSIS — R10.9 FLANK PAIN: Primary | ICD-10-CM

## 2023-08-23 RX ORDER — NITROFURANTOIN 25; 75 MG/1; MG/1
100 CAPSULE ORAL 2 TIMES DAILY
Qty: 14 CAPSULE | Refills: 0 | Status: SHIPPED | OUTPATIENT
Start: 2023-08-23

## 2023-08-23 NOTE — TELEPHONE ENCOUNTER
Caller: Mona Sheffield    Relationship: Self    Best call back number: [phone]     Do you know the name of the person who called: JULI    What was the call regarding: RESULTS

## 2023-08-23 NOTE — TELEPHONE ENCOUNTER
Spoke with patient and she stated that she is having a lot of pain in her upper legs and lower back. She said she cannot hold her urine at all. It just comes out when it wants and she is wearing a poise pad because she cannot control things now at all.

## 2023-08-23 NOTE — TELEPHONE ENCOUNTER
I am starting her on an antibiotic which has been sent to the pharmacy and I am referring her to the urologist that she was seen by during hospitalization.

## 2023-08-28 DIAGNOSIS — M51.36 DDD (DEGENERATIVE DISC DISEASE), LUMBAR: ICD-10-CM

## 2023-08-28 RX ORDER — OXYCODONE AND ACETAMINOPHEN 10; 325 MG/1; MG/1
1 TABLET ORAL 2 TIMES DAILY PRN
Qty: 60 TABLET | Refills: 0 | Status: SHIPPED | OUTPATIENT
Start: 2023-08-28

## 2023-08-28 NOTE — TELEPHONE ENCOUNTER
Caller: [facility] Mona J    Relationship: Self    Best call back number: [phone]    Requested Prescriptions:   Requested Prescriptions     Pending Prescriptions Disp Refills    oxyCODONE-acetaminophen (PERCOCET)  MG per tablet 60 tablet 0     Sig: Take 1 tablet by mouth 2 (Two) Times a Day As Needed for Moderate Pain.        Pharmacy where request should be sent: [facility][id number] - [place] [address] [address] [place] [address] - [phone] [facility] [phone] [place]     Last office visit with prescribing clinician: 7/11/2023   Last telemedicine visit with prescribing clinician: Visit date not found   Next office visit with prescribing clinician: 9/6/2023     Additional details provided by patient: PATIENT STATES HER PHARMACY FINALLY HAS MEDICATION IN STOCK AND NEEDS NEW PRESCRIPTION SENT TO PHARMACY    Does the patient have less than a 3 day supply:  [x] Yes  [] No    Farrah Garrett Rep   08/28/23 15:06 EDT

## 2023-08-29 ENCOUNTER — TELEPHONE (OUTPATIENT)
Dept: FAMILY MEDICINE CLINIC | Facility: CLINIC | Age: 68
End: 2023-08-29
Payer: MEDICARE

## 2023-08-29 NOTE — TELEPHONE ENCOUNTER
Caller: Mona Sheffield    Relationship: Self    Best call back number: [phone]     PATIENT WANTED TO CONFIRM THAT HER OXYCODONE WAS CALLED INTO THE PHARMACY, AND WHICH LOCATION WAS IT SENT TO ?    [facility]

## 2023-08-30 NOTE — TELEPHONE ENCOUNTER
Left message to let pt know that this was called to [facility] in [place] and to let us know if she has any other questions.

## 2023-09-06 ENCOUNTER — OFFICE VISIT (OUTPATIENT)
Dept: FAMILY MEDICINE CLINIC | Facility: CLINIC | Age: 68
End: 2023-09-06
Payer: MEDICARE

## 2023-09-06 VITALS
SYSTOLIC BLOOD PRESSURE: 128 MMHG | HEIGHT: 65 IN | HEART RATE: 53 BPM | BODY MASS INDEX: 28.32 KG/M2 | DIASTOLIC BLOOD PRESSURE: 80 MMHG | OXYGEN SATURATION: 94 % | WEIGHT: 170 LBS

## 2023-09-06 DIAGNOSIS — G47.33 OSA (OBSTRUCTIVE SLEEP APNEA): ICD-10-CM

## 2023-09-06 DIAGNOSIS — D23.5 DERMOID CYST OF SKIN OF BACK: ICD-10-CM

## 2023-09-06 DIAGNOSIS — M51.36 DDD (DEGENERATIVE DISC DISEASE), LUMBAR: ICD-10-CM

## 2023-09-06 DIAGNOSIS — I10 PRIMARY HYPERTENSION: ICD-10-CM

## 2023-09-06 DIAGNOSIS — R07.9 CHEST PAIN, UNSPECIFIED TYPE: ICD-10-CM

## 2023-09-06 DIAGNOSIS — R39.15 URINARY URGENCY: ICD-10-CM

## 2023-09-06 DIAGNOSIS — N39.0 RECURRENT UTI: Primary | ICD-10-CM

## 2023-09-06 DIAGNOSIS — R32 URINARY INCONTINENCE, UNSPECIFIED TYPE: ICD-10-CM

## 2023-09-06 DIAGNOSIS — E78.2 MIXED HYPERLIPIDEMIA: ICD-10-CM

## 2023-09-06 PROCEDURE — 81001 URINALYSIS AUTO W/SCOPE: CPT | Performed by: NURSE PRACTITIONER

## 2023-09-06 NOTE — ASSESSMENT & PLAN NOTE
Percocet as needed for severe pain  Follow-up with pain management regarding stimulator and other injection options   04-Sep-2023 09:06

## 2023-09-06 NOTE — PROGRESS NOTES
Chief Complaint  Follow-up (Following up for UTI issues/still having urgency cannot hold urine )    Subjective        Mona Sheffield presents to [facility]  History of Present Illness    Patient presents for follow-up visit regarding UTI.  She was admitted to [facility] from 8/10-8/13 with diagnosis of UTI after presenting with complaints of dysuria, urinary urgency, frequency and suprapubic discomfort.  Patient was treated with Rocephin and urology was consulted.  Owen catheter placed in ED, removed prior to discharge with a voiding trial.  Patient was noted to have intermittent visual hallucinations (reportedly resolved) during hospitalization.  She was advised to follow-up with urology upon discharge - appointment not scheduled. Patient continues to have urinary urgency, unable to hold urine. She is having to wear pads.     Patient has hypertension, taking Atenolol 100 mg daily, Hydralazine 25 mg TID, Hyzaar daily and Clonidine BID.  Blood pressure is stable.  Patient does have intermittent chest pain, referred to cardiology last year for further evaluation but canceled appointment and she has not rescheduled.    Patent has COPD, prescribed Brezri and albuterol PRN dyspnea. She reports symptoms are stable, has no acute complaints.  Patient wearing CPAP nightly for MURTAZA - poorly tolerating her mask.     Patient has chronic back pain related to lumbar DDD.  She is taking Percocet as needed for pain, previously followed by Pain Management. She plans to see them next week due to c/o at site of stimulator. Patient also c/o abdominal pain, site of umbilicus. She is concerned that stimulator is causing generalized pain.     Patient also has epidermal cyst her back.  She was seen by dermatology last year and advised excision but did not schedule procedure.  Patient is wanting referral back to dermatology.    Objective   Vital Signs:  /80 (BP Location: Left arm, Patient  "Position: Sitting, Cuff Size: Adult)   Pulse 53   Ht 165.1 cm (65\")   Wt 77.1 kg (170 lb)   SpO2 94%   BMI 28.29 kg/m²   Estimated body mass index is 28.29 kg/m² as calculated from the following:    Height as of this encounter: 165.1 cm (65\").    Weight as of this encounter: 77.1 kg (170 lb).           Physical Exam  Constitutional:       Appearance: Normal appearance.   HENT:      Head: Normocephalic.   Cardiovascular:      Rate and Rhythm: Normal rate and regular rhythm.   Pulmonary:      Effort: Pulmonary effort is normal.      Breath sounds: Normal breath sounds.   Abdominal:      General: Abdomen is flat. Bowel sounds are normal.      Palpations: Abdomen is soft.   Musculoskeletal:         General: Normal range of motion.      Cervical back: Neck supple.      Right lower leg: No edema.      Left lower leg: No edema.   Skin:     General: Skin is warm and dry.   Neurological:      Mental Status: She is alert and oriented to person, place, and time.      Gait: Gait is intact.   Psychiatric:         Attention and Perception: Attention normal.         Mood and Affect: Mood normal.         Speech: Speech normal.      Result Review :    CMP          8/11/2023    04:55 8/11/2023    20:57 8/12/2023    03:35 8/13/2023    08:00   CMP   Glucose 110   106  129    BUN 42   27  15    Creatinine 1.05   0.73  0.78    EGFR 58.7   90.8  83.9    Sodium 141   141  138    Potassium 3.3  4.1  4.4  4.0    Chloride 104   107  100    Calcium 8.4   8.7  9.3    BUN/Creatinine Ratio 40.0   37.0  19.2    Anion Gap 10.0   7.0  11.0      CBC          8/11/2023    04:55 8/12/2023    03:35 8/13/2023    08:00   CBC   WBC 8.70  8.00  10.70    RBC 3.92  3.81  3.87    Hemoglobin 11.5  11.2  11.4    Hematocrit 34.3  33.4  34.3    MCV 87.5  87.7  88.4    MCH 29.4  29.4  29.3    MCHC 33.6  33.5  33.2    RDW 13.5  13.3  13.1    Platelets 150  146  172      Lipid Panel          2/14/2023    15:12 7/11/2023    11:26   Lipid Panel   Total Cholesterol " 242  185    Triglycerides 377  399    HDL Cholesterol 37  31    VLDL Cholesterol 69  66    LDL Cholesterol  136  88    LDL/HDL Ratio 3.50  2.39      TSH          2/14/2023    15:12   TSH   TSH 0.985      Most Recent A1C          7/11/2023    11:26   HGBA1C Most Recent   Hemoglobin A1C 6.20      Data reviewed : Recent hospitalization notes [facility] H&P             Assessment and Plan   Diagnoses and all orders for this visit:    1. Recurrent UTI (Primary)  Assessment & Plan:  Repeat UA  Refer to urology for further evaluation    Orders:  -     Urinalysis With Culture If Indicated - Urine, Clean Catch  -     Ambulatory Referral to Urology    2. Urinary urgency  -     Urinalysis With Culture If Indicated - Urine, Clean Catch  -     Ambulatory Referral to Urology    3. Urinary incontinence, unspecified type  -     Urinalysis With Culture If Indicated - Urine, Clean Catch  -     Ambulatory Referral to Urology    4. MURTAZA (obstructive sleep apnea)  Assessment & Plan:  Order placed for mask fitting    Orders:  -     PAP Therapy    5. DDD (degenerative disc disease), lumbar  Assessment & Plan:  Percocet as needed for severe pain  Follow-up with pain management regarding stimulator and other injection options      6. Chest pain, unspecified type  -     Ambulatory Referral to Cardiology    7. Mixed hyperlipidemia  -     Ambulatory Referral to Cardiology    8. Primary hypertension  Assessment & Plan:  Blood pressure at goal of less than 140/90  Continue medications as prescribed    Orders:  -     Ambulatory Referral to Cardiology    9. Dermoid cyst of skin of back  -     Ambulatory Referral to Dermatology           I spent 35 minutes caring for Mona on this date of service. This time includes time spent by me in the following activities:preparing for the visit, reviewing tests, obtaining and/or reviewing a separately obtained history, performing a medically appropriate examination and/or evaluation , counseling and  educating the patient/family/caregiver, ordering medications, tests, or procedures, referring and communicating with other health care professionals , documenting information in the medical record, and care coordination  Follow Up   Return for Next scheduled follow up.  Patient was given instructions and counseling regarding her condition or for health maintenance advice. Please see specific information pulled into the AVS if appropriate.

## 2023-09-07 LAB
BACTERIA UR QL AUTO: ABNORMAL /HPF
BILIRUB UR QL STRIP: NEGATIVE
CLARITY UR: CLEAR
COLOR UR: YELLOW
GLUCOSE UR STRIP-MCNC: NEGATIVE MG/DL
HGB UR QL STRIP.AUTO: NEGATIVE
HYALINE CASTS UR QL AUTO: ABNORMAL /LPF
KETONES UR QL STRIP: NEGATIVE
LEUKOCYTE ESTERASE UR QL STRIP.AUTO: ABNORMAL
NITRITE UR QL STRIP: NEGATIVE
PH UR STRIP.AUTO: 6.5 [PH] (ref 5–8)
PROT UR QL STRIP: ABNORMAL
RBC # UR STRIP: ABNORMAL /HPF
REF LAB TEST METHOD: ABNORMAL
SP GR UR STRIP: 1.01 (ref 1–1.03)
SQUAMOUS #/AREA URNS HPF: ABNORMAL /HPF
STARCH GRANULES URNS QL MICRO: ABNORMAL /HPF
UROBILINOGEN UR QL STRIP: ABNORMAL
WBC # UR STRIP: ABNORMAL /HPF
YEAST URNS QL MICRO: ABNORMAL /HPF

## 2023-09-08 RX ORDER — LOSARTAN POTASSIUM AND HYDROCHLOROTHIAZIDE 25; 100 MG/1; MG/1
1 TABLET ORAL DAILY
Qty: 90 TABLET | Refills: 0 | Status: SHIPPED | OUTPATIENT
Start: 2023-09-08

## 2023-09-14 ENCOUNTER — OFFICE VISIT (OUTPATIENT)
Dept: CARDIOLOGY | Facility: CLINIC | Age: 68
End: 2023-09-14
Payer: MEDICARE

## 2023-09-14 VITALS
SYSTOLIC BLOOD PRESSURE: 105 MMHG | OXYGEN SATURATION: 95 % | WEIGHT: 172 LBS | HEART RATE: 49 BPM | BODY MASS INDEX: 28.66 KG/M2 | DIASTOLIC BLOOD PRESSURE: 67 MMHG | HEIGHT: 65 IN

## 2023-09-14 DIAGNOSIS — R06.02 SHORTNESS OF BREATH: Primary | ICD-10-CM

## 2023-09-14 DIAGNOSIS — E78.5 DYSLIPIDEMIA: ICD-10-CM

## 2023-09-14 DIAGNOSIS — R07.89 CHEST DISCOMFORT: ICD-10-CM

## 2023-09-14 DIAGNOSIS — I10 ESSENTIAL HYPERTENSION: ICD-10-CM

## 2023-09-14 PROCEDURE — 3078F DIAST BP <80 MM HG: CPT | Performed by: INTERNAL MEDICINE

## 2023-09-14 PROCEDURE — 1159F MED LIST DOCD IN RCRD: CPT | Performed by: INTERNAL MEDICINE

## 2023-09-14 PROCEDURE — 93000 ELECTROCARDIOGRAM COMPLETE: CPT | Performed by: INTERNAL MEDICINE

## 2023-09-14 PROCEDURE — 3074F SYST BP LT 130 MM HG: CPT | Performed by: INTERNAL MEDICINE

## 2023-09-14 PROCEDURE — 1160F RVW MEDS BY RX/DR IN RCRD: CPT | Performed by: INTERNAL MEDICINE

## 2023-09-14 PROCEDURE — 99204 OFFICE O/P NEW MOD 45 MIN: CPT | Performed by: INTERNAL MEDICINE

## 2023-09-14 NOTE — PROGRESS NOTES
Encounter Date:09/14/2023      Patient ID: Mona Sheffield is a 67 y.o. female.    Chief Complaint:  Shortness of breath  Chest discomfort  Hypertension  Dyslipidemia      History of Present Illness  Patient is a 67-year-old white female is here for evaluation of above problems.  Patient is a smoker.  Patient has been having off-and-on chest discomfort substernal heaviness and tightness sensation.  Patient also has exertional shortness of breath and family history of coronary artery disease.    Patient is not having any palpitations, dizziness or syncope.  Denies having any headache ,abdominal pain ,nausea, vomiting , diarrhea constipation, loss of weight or loss of appetite.  Denies having any excessive bruising ,hematuria or blood in the stool.    Review of all systems negative except as indicated.    Reviewed ROS.    Assessment and Plan     ]]]]]]]]]]]]]]]]]]]]]]  Impression  ==========  -Chest discomfort and shortness of breath    - Hypertension dyslipidemia obstructive sleep apnea    - Status post hysterectomy appendectomy cholecystectomy spinal cord stimulator placement tonsillectomy    - Family history of hypertension heart failure    - Smoker    - No known allergies    ==============  Plan  ===============  Chest discomfort and shortness of breath.  Patient has multiple coronary risk factors.  EKG showed spinal cord stimulator artifact sinus bradycardia 49/min no ectopy-9/14/2023  Stress Cardiolite test  Echocardiogram    Hypertension-well-controlled  Blood pressure 105/67  Patient is on atenolol clonidine hydralazine losartan hydrochlorothiazide    Dyslipidemia-on atorvastatin    Smoker-abstinence from smoking.    Medications were reviewed and updated.    Follow-up in the office on the same day.    Further plan will depend on patient's progress.  [[[[[[[[[[[[[[[[[[[[[[[               Diagnosis Plan   1. Shortness of breath  Adult Transthoracic Echo Complete W/ Cont if Necessary Per Protocol    Stress Test  With Myocardial Perfusion One Day      2. Chest discomfort  Adult Transthoracic Echo Complete W/ Cont if Necessary Per Protocol    Stress Test With Myocardial Perfusion One Day      3. Essential hypertension  Adult Transthoracic Echo Complete W/ Cont if Necessary Per Protocol    Stress Test With Myocardial Perfusion One Day      4. Dyslipidemia        LAB RESULTS (LAST 7 DAYS)    CBC        BMP        CMP         BNP        TROPONIN        CoAg        Creatinine Clearance  CrCl cannot be calculated (Patient's most recent lab result is older than the maximum 30 days allowed.).    ABG        Radiology  No radiology results for the last day                The following portions of the patient's history were reviewed and updated as appropriate: allergies, current medications, past family history, past medical history, past social history, past surgical history, and problem list.    Review of Systems   Constitutional: Positive for malaise/fatigue.   Cardiovascular:  Positive for palpitations. Negative for chest pain, leg swelling and syncope.   Respiratory:  Positive for shortness of breath.    Skin:  Negative for rash.   Gastrointestinal:  Negative for nausea and vomiting.   Neurological:  Positive for dizziness and light-headedness. Negative for numbness.   All other systems reviewed and are negative.      Current Outpatient Medications:   •  albuterol (PROVENTIL) (2.5 MG/3ML) 0.083% nebulizer solution, Take 2.5 mg by nebulization Every 4 (Four) Hours As Needed for Wheezing., Disp: 180 mL, Rfl: 0  •  albuterol sulfate  (90 Base) MCG/ACT inhaler, albuterol sulfate HFA 90 mcg/actuation aerosol inhaler  INHALE 2 PUFFS BY MOUTH EVERY 6 HOURS AS NEEDED, Disp: , Rfl:   •  atenolol (TENORMIN) 100 MG tablet, TAKE 1 TABLET BY MOUTH EVERY DAY, Disp: 90 tablet, Rfl: 0  •  atorvastatin (LIPITOR) 40 MG tablet, Take 1 tablet by mouth Daily for 90 days., Disp: 90 tablet, Rfl: 1  •  baclofen (LIORESAL) 10 MG tablet, TAKE 1  TABLET BY MOUTH EVERY NIGHT, Disp: 30 tablet, Rfl: 1  •  Budeson-Glycopyrrol-Formoterol (Breztri Aerosphere) 160-9-4.8 MCG/ACT aerosol inhaler, Inhale 2 puffs 2 (Two) Times a Day., Disp: 8 g, Rfl: 1  •  buPROPion XL (WELLBUTRIN XL) 150 MG 24 hr tablet, TAKE 1 TABLET BY MOUTH DAILY, Disp: 30 tablet, Rfl: 1  •  cloNIDine (CATAPRES) 0.2 MG tablet, TAKE 1 TABLET BY MOUTH TWICE DAILY, Disp: 180 tablet, Rfl: 1  •  dexlansoprazole (DEXILANT) 60 MG capsule, Take 1 capsule by mouth Daily., Disp: , Rfl:   •  Diclofenac Sodium (VOLTAREN) 1 % gel gel, Apply 4 g topically to the appropriate area as directed 4 (Four) Times a Day As Needed (hand pain)., Disp: 60 g, Rfl: 2  •  fluticasone (FLONASE) 50 MCG/ACT nasal spray, fluticasone propionate 50 mcg/actuation nasal spray,suspension, Disp: , Rfl:   •  hydrALAZINE (APRESOLINE) 25 MG tablet, TAKE 1 TABLET BY MOUTH THREE TIMES DAILY, Disp: 270 tablet, Rfl: 0  •  hydrOXYzine pamoate (VISTARIL) 25 MG capsule, Take 1 capsule by mouth 3 (Three) Times a Day As Needed for Anxiety., Disp: 30 capsule, Rfl: 0  •  lidocaine (LIDODERM) 5 %, Lidoderm 5 % topical patch  APPLY 1 PATCH BY TRANSDERMAL ROUTE ONCE DAILY (MAY WEAR UP TO 12HOURS.), Disp: , Rfl:   •  Lidocaine, Anorectal, (LMX 5) 5 % cream cream, Apply nightly as needed., Disp: 60 g, Rfl: 1  •  losartan-hydrochlorothiazide (HYZAAR) 100-25 MG per tablet, TAKE 1 TABLET BY MOUTH DAILY, Disp: 90 tablet, Rfl: 0  •  oxyCODONE-acetaminophen (PERCOCET)  MG per tablet, Take 1 tablet by mouth 2 (Two) Times a Day As Needed for Moderate Pain., Disp: 60 tablet, Rfl: 0  •  sertraline (ZOLOFT) 100 MG tablet, TAKE 1 AND 1/2 TABLETS BY MOUTH DAILY, Disp: 135 tablet, Rfl: 0  •  traZODone (DESYREL) 50 MG tablet, TAKE 1 TABLET BY MOUTH EVERY NIGHT, Disp: 30 tablet, Rfl: 1  •  triamcinolone (KENALOG) 0.1 % cream, Apply 1 application  topically to the appropriate area as directed 2 (Two) Times a Day., Disp: 60 g, Rfl: 1    No Known Allergies    Family  "History   Problem Relation Age of Onset   • Hypertension Mother    • Heart failure Mother    • Hypertension Father    • Heart disease Father    • Heart failure Brother        Past Surgical History:   Procedure Laterality Date   • APPENDECTOMY     • CHOLECYSTECTOMY     • HYSTERECTOMY     • SPINAL CORD STIMULATOR TRIAL W/ LAMINOTOMY     • TONSILLECTOMY         Past Medical History:   Diagnosis Date   • Arthritis    • Asthma    • Depression    • Hypertension    • Injury of back    • Pneumonia        Family History   Problem Relation Age of Onset   • Hypertension Mother    • Heart failure Mother    • Hypertension Father    • Heart disease Father    • Heart failure Brother        Social History     Socioeconomic History   • Marital status:    Tobacco Use   • Smoking status: Every Day     Packs/day: 0.50     Years: 50.00     Pack years: 25.00     Types: Cigarettes     Passive exposure: Current   • Smokeless tobacco: Never   Vaping Use   • Vaping Use: Never used   Substance and Sexual Activity   • Alcohol use: Yes     Alcohol/week: 2.0 standard drinks     Types: 1 Glasses of wine, 1 Cans of beer per week     Comment: OCC   • Drug use: Yes     Types: Marijuana     Comment: medical marijuana   • Sexual activity: Defer           ECG 12 Lead    Date/Time: 9/14/2023 3:32 PM  Performed by: Thomas Roa MD  Authorized by: Thomas Roa MD   Comparison: compared with previous ECG   Comparison to previous ECG: Sinus bradycardia baseline artifact due to spinal cord stimulator normal axis normal intervals no ectopy 49/min no prior tracing for comparison.        Objective:       Physical Exam    /67 (BP Location: Left arm, Patient Position: Sitting, Cuff Size: Large Adult)   Pulse (!) 49   Ht 165.1 cm (65\")   Wt 78 kg (172 lb)   SpO2 95% Comment: RA  BMI 28.62 kg/m²   The patient is alert, oriented and in no distress.    Vital signs as noted above.    Head and neck revealed no carotid bruits or jugular " venous distension.  No thyromegaly or lymphadenopathy is present.    Lungs clear.  No wheezing.  Breath sounds are normal bilaterally.    Heart normal first and second heart sounds.  No murmur..  No pericardial rub is present.  No gallop is present.    Abdomen soft and nontender.  No organomegaly is present.    Extremities revealed good peripheral pulses without any pedal edema.    Skin warm and dry.    Musculoskeletal system is grossly normal.    CNS grossly normal.    Reviewed and updated.

## 2023-09-18 ENCOUNTER — PATIENT ROUNDING (BHMG ONLY) (OUTPATIENT)
Dept: CARDIOLOGY | Facility: CLINIC | Age: 68
End: 2023-09-18
Payer: MEDICARE

## 2023-09-18 NOTE — PROGRESS NOTES
A My-Chart message has been sent to the patient for PATIENT ROUNDING with [facility] [place]

## 2023-09-21 RX ORDER — BUDESONIDE, GLYCOPYRROLATE, AND FORMOTEROL FUMARATE 160; 9; 4.8 UG/1; UG/1; UG/1
AEROSOL, METERED RESPIRATORY (INHALATION)
Qty: 10.7 G | Refills: 2 | Status: SHIPPED | OUTPATIENT
Start: 2023-09-21

## 2023-09-29 ENCOUNTER — TELEPHONE (OUTPATIENT)
Dept: CARDIOLOGY | Facility: CLINIC | Age: 68
End: 2023-09-29
Payer: MEDICARE

## 2023-09-29 NOTE — TELEPHONE ENCOUNTER
LEFT VM TO CALL BACK ABOUT APPT, PT HAS [employer] AND AS OF 9/22/23 [employer] IS OUT OF NETWORK WITH DR WOLFE'S OFFICE

## 2023-10-03 RX ORDER — ATORVASTATIN CALCIUM 20 MG/1
20 TABLET, FILM COATED ORAL DAILY
Qty: 90 TABLET | Refills: 0 | OUTPATIENT
Start: 2023-10-03

## 2023-10-13 RX ORDER — SERTRALINE HYDROCHLORIDE 100 MG/1
TABLET, FILM COATED ORAL
Qty: 135 TABLET | Refills: 0 | Status: SHIPPED | OUTPATIENT
Start: 2023-10-13

## 2023-10-15 NOTE — PROGRESS NOTES
Encounter Date:11/02/2023    Last seen-9/14/2023      Patient ID: Mona Sheffield is a 67 y.o. female.    Chief Complaint:  Shortness of breath  Chest discomfort  Hypertension  Dyslipidemia        History of Present Illness  Patient recently was seen with following history.  Reviewed and updated.    Patient is a 67-year-old white female is here for evaluation of above problems.  Patient is a smoker.  Patient has been having off-and-on chest discomfort substernal heaviness and tightness sensation.  Patient also has exertional shortness of breath and family history of coronary artery disease.     Patient is not having any palpitations, dizziness or syncope.  Denies having any headache ,abdominal pain ,nausea, vomiting , diarrhea constipation, loss of weight or loss of appetite.  Denies having any excessive bruising ,hematuria or blood in the stool.     Review of all systems negative except as indicated.     Reviewed ROS.     Assessment and Plan      ]]]]]]]]]]]]]]]]]]]]]]  History  ==========  -Chest discomfort and shortness of breath    Stress Myoview-normal-11/2/2023.    Echocardiogram 11/2/2023.    Left ventricular systolic function is normal. Calculated left ventricular EF = 63% Left ventricular ejection fraction appears to be 61 - 65%.    Left ventricular diastolic function is consistent with (grade II w/high LAP) pseudonormalization.    The left atrial cavity is dilated.    Estimated right ventricular systolic pressure from tricuspid regurgitation is normal (<35 mmHg).    No significant valvular abnormalities noted.     - Hypertension dyslipidemia obstructive sleep apnea     - Status post hysterectomy appendectomy cholecystectomy spinal cord stimulator placement tonsillectomy     - Family history of hypertension heart failure     - Smoker     - No known allergies     ==============  Plan  ===============  Chest discomfort and shortness of breath.  Patient has multiple coronary risk factors.  EKG showed spinal  cord stimulator artifact sinus bradycardia 49/min no ectopy  Prolonged QT interval.-9/14/2023.    Stress Cardiolite test-normal-as above  Echocardiogram-normal as above     Hypertension-blood pressure 140/80.  Maintain blood pressure log.  Patient is on atenolol clonidine hydralazine losartan hydrochlorothiazide     Dyslipidemia-on atorvastatin    Prolonged QT interval.     Smoker-abstinence from smoking.     Medications were reviewed and updated.    Patient has prolonged QT interval.  Patient is on sertraline.  Patient is asymptomatic  We will have electrophysiology consultation regarding options.  Dr. Hopper to see the patient soon possible..     Follow-up in the office in 6 weeks with EKG..     Further plan will depend on patient's progress.    Reviewed and updated-11/2/2023  [[[[[[[[[[[[[[[[[[[[[[[                  Diagnosis Plan   1. Shortness of breath        2. Chest discomfort        3. Dyslipidemia        4. Essential hypertension        LAB RESULTS (LAST 7 DAYS)    CBC        BMP        CMP         BNP        TROPONIN        CoAg        Creatinine Clearance  CrCl cannot be calculated (Patient's most recent lab result is older than the maximum 30 days allowed.).    ABG        Radiology  No radiology results for the last day                The following portions of the patient's history were reviewed and updated as appropriate: allergies, current medications, past family history, past medical history, past social history, past surgical history, and problem list.    Review of Systems   Constitutional: Negative for malaise/fatigue.   Cardiovascular:  Negative for chest pain, leg swelling, palpitations and syncope.   Respiratory:  Negative for shortness of breath.    Skin:  Negative for rash.   Gastrointestinal:  Negative for nausea and vomiting.   Neurological:  Negative for dizziness, light-headedness and numbness.   All other systems reviewed and are negative.      Current Outpatient Medications:      albuterol (PROVENTIL) (2.5 MG/3ML) 0.083% nebulizer solution, Take 2.5 mg by nebulization Every 4 (Four) Hours As Needed for Wheezing., Disp: 180 mL, Rfl: 0    albuterol sulfate  (90 Base) MCG/ACT inhaler, albuterol sulfate HFA 90 mcg/actuation aerosol inhaler  INHALE 2 PUFFS BY MOUTH EVERY 6 HOURS AS NEEDED, Disp: , Rfl:     atenolol (TENORMIN) 100 MG tablet, TAKE 1 TABLET BY MOUTH EVERY DAY, Disp: 90 tablet, Rfl: 0    baclofen (LIORESAL) 10 MG tablet, TAKE 1 TABLET BY MOUTH EVERY NIGHT, Disp: 30 tablet, Rfl: 1    Budeson-Glycopyrrol-Formoterol (Breztri Aerosphere) 160-9-4.8 MCG/ACT aerosol inhaler, INHALE 2 PUFFS BY MOUTH TWICE DAILY, Disp: 10.7 g, Rfl: 2    buPROPion XL (WELLBUTRIN XL) 150 MG 24 hr tablet, TAKE 1 TABLET BY MOUTH DAILY, Disp: 30 tablet, Rfl: 1    cloNIDine (CATAPRES) 0.2 MG tablet, TAKE 1 TABLET BY MOUTH TWICE DAILY, Disp: 180 tablet, Rfl: 1    dexlansoprazole (DEXILANT) 60 MG capsule, Take 1 capsule by mouth Daily., Disp: , Rfl:     Diclofenac Sodium (VOLTAREN) 1 % gel gel, Apply 4 g topically to the appropriate area as directed 4 (Four) Times a Day As Needed (hand pain)., Disp: 60 g, Rfl: 2    fluticasone (FLONASE) 50 MCG/ACT nasal spray, fluticasone propionate 50 mcg/actuation nasal spray,suspension, Disp: , Rfl:     hydrALAZINE (APRESOLINE) 25 MG tablet, TAKE 1 TABLET BY MOUTH THREE TIMES DAILY, Disp: 270 tablet, Rfl: 0    hydrOXYzine pamoate (VISTARIL) 25 MG capsule, Take 1 capsule by mouth 3 (Three) Times a Day As Needed for Anxiety., Disp: 30 capsule, Rfl: 0    lidocaine (LIDODERM) 5 %, Lidoderm 5 % topical patch  APPLY 1 PATCH BY TRANSDERMAL ROUTE ONCE DAILY (MAY WEAR UP TO 12HOURS.), Disp: , Rfl:     Lidocaine, Anorectal, (LMX 5) 5 % cream cream, Apply nightly as needed., Disp: 60 g, Rfl: 1    losartan-hydrochlorothiazide (HYZAAR) 100-25 MG per tablet, TAKE 1 TABLET BY MOUTH DAILY, Disp: 90 tablet, Rfl: 0    oxyCODONE-acetaminophen (PERCOCET)  MG per tablet, Take 1 tablet by  mouth 2 (Two) Times a Day As Needed for Moderate Pain., Disp: 60 tablet, Rfl: 0    sertraline (ZOLOFT) 100 MG tablet, TAKE 1 AND 1/2 TABLETS BY MOUTH DAILY, Disp: 135 tablet, Rfl: 0    traZODone (DESYREL) 50 MG tablet, TAKE 1 TABLET BY MOUTH EVERY NIGHT, Disp: 30 tablet, Rfl: 1    triamcinolone (KENALOG) 0.1 % cream, Apply 1 application  topically to the appropriate area as directed 2 (Two) Times a Day., Disp: 60 g, Rfl: 1    No Known Allergies    Family History   Problem Relation Age of Onset    Hypertension Mother     Heart failure Mother     Hypertension Father     Heart disease Father     Heart failure Brother        Past Surgical History:   Procedure Laterality Date    APPENDECTOMY      CHOLECYSTECTOMY      HYSTERECTOMY      SPINAL CORD STIMULATOR TRIAL W/ LAMINOTOMY      TONSILLECTOMY         Past Medical History:   Diagnosis Date    Arthritis     Asthma     Depression     Hypertension     Injury of back     Pneumonia        Family History   Problem Relation Age of Onset    Hypertension Mother     Heart failure Mother     Hypertension Father     Heart disease Father     Heart failure Brother        Social History     Socioeconomic History    Marital status:    Tobacco Use    Smoking status: Every Day     Packs/day: 0.50     Years: 50.00     Additional pack years: 0.00     Total pack years: 25.00     Types: Cigarettes     Passive exposure: Current    Smokeless tobacco: Never   Vaping Use    Vaping Use: Never used   Substance and Sexual Activity    Alcohol use: Yes     Alcohol/week: 2.0 standard drinks of alcohol     Types: 1 Glasses of wine, 1 Cans of beer per week     Comment: OCC    Drug use: Yes     Types: Marijuana     Comment: medical marijuana    Sexual activity: Defer         Procedures      Objective:       Physical Exam    There were no vitals taken for this visit.  The patient is alert, oriented and in no distress.    Vital signs as noted above.    Head and neck revealed no carotid bruits or  jugular venous distension.  No thyromegaly or lymphadenopathy is present.    Lungs clear.  No wheezing.  Breath sounds are normal bilaterally.    Heart normal first and second heart sounds.  No murmur..  No pericardial rub is present.  No gallop is present.    Abdomen soft and nontender.  No organomegaly is present.    Extremities revealed good peripheral pulses without any pedal edema.    Skin warm and dry.    Musculoskeletal system is grossly normal.    CNS grossly normal.    Reviewed and updated.

## 2023-11-02 ENCOUNTER — HOSPITAL ENCOUNTER (OUTPATIENT)
Dept: CARDIOLOGY | Facility: HOSPITAL | Age: 68
Discharge: HOME | End: 2023-11-02
Payer: MEDICARE

## 2023-11-02 ENCOUNTER — OFFICE VISIT (OUTPATIENT)
Dept: CARDIOLOGY | Facility: CLINIC | Age: 68
End: 2023-11-02
Payer: MEDICARE

## 2023-11-02 VITALS
SYSTOLIC BLOOD PRESSURE: 140 MMHG | HEIGHT: 65 IN | WEIGHT: 172 LBS | DIASTOLIC BLOOD PRESSURE: 80 MMHG | HEART RATE: 49 BPM | BODY MASS INDEX: 28.66 KG/M2

## 2023-11-02 VITALS
DIASTOLIC BLOOD PRESSURE: 50 MMHG | SYSTOLIC BLOOD PRESSURE: 107 MMHG | HEIGHT: 65 IN | BODY MASS INDEX: 28.66 KG/M2 | WEIGHT: 172 LBS

## 2023-11-02 DIAGNOSIS — R07.89 CHEST DISCOMFORT: ICD-10-CM

## 2023-11-02 DIAGNOSIS — R06.02 SHORTNESS OF BREATH: Primary | ICD-10-CM

## 2023-11-02 DIAGNOSIS — R94.31 LONG QT INTERVAL: ICD-10-CM

## 2023-11-02 DIAGNOSIS — I10 ESSENTIAL HYPERTENSION: ICD-10-CM

## 2023-11-02 DIAGNOSIS — R06.02 SHORTNESS OF BREATH: ICD-10-CM

## 2023-11-02 DIAGNOSIS — E78.5 DYSLIPIDEMIA: ICD-10-CM

## 2023-11-02 LAB
BH CV ECHO MEAS - AO MAX PG: 10.4 MMHG
BH CV ECHO MEAS - AO MEAN PG: 4.2 MMHG
BH CV ECHO MEAS - AO ROOT DIAM: 3.3 CM
BH CV ECHO MEAS - AO V2 MAX: 160.9 CM/SEC
BH CV ECHO MEAS - AO V2 VTI: 30.1 CM
BH CV ECHO MEAS - AVA(I,D): 1.93 CM2
BH CV ECHO MEAS - EDV(CUBED): 81.5 ML
BH CV ECHO MEAS - EDV(MOD-SP4): 66.8 ML
BH CV ECHO MEAS - EF(MOD-BP): 63 %
BH CV ECHO MEAS - EF(MOD-SP4): 63.2 %
BH CV ECHO MEAS - ESV(CUBED): 28.2 ML
BH CV ECHO MEAS - ESV(MOD-SP4): 24.6 ML
BH CV ECHO MEAS - FS: 29.8 %
BH CV ECHO MEAS - IVS/LVPW: 0.89 CM
BH CV ECHO MEAS - IVSD: 1 CM
BH CV ECHO MEAS - LA DIMENSION: 4.5 CM
BH CV ECHO MEAS - LV MASS(C)D: 157.6 GRAMS
BH CV ECHO MEAS - LV MAX PG: 3.6 MMHG
BH CV ECHO MEAS - LV MEAN PG: 1.77 MMHG
BH CV ECHO MEAS - LV V1 MAX: 94.6 CM/SEC
BH CV ECHO MEAS - LV V1 VTI: 21.5 CM
BH CV ECHO MEAS - LVIDD: 4.3 CM
BH CV ECHO MEAS - LVIDS: 3 CM
BH CV ECHO MEAS - LVOT AREA: 2.7 CM2
BH CV ECHO MEAS - LVOT DIAM: 1.85 CM
BH CV ECHO MEAS - LVPWD: 1.13 CM
BH CV ECHO MEAS - MV A MAX VEL: 36.8 CM/SEC
BH CV ECHO MEAS - MV DEC SLOPE: 188.7 CM/SEC2
BH CV ECHO MEAS - MV DEC TIME: 0.31 SEC
BH CV ECHO MEAS - MV E MAX VEL: 59.4 CM/SEC
BH CV ECHO MEAS - MV E/A: 1.62
BH CV ECHO MEAS - MV MAX PG: 2.7 MMHG
BH CV ECHO MEAS - MV MEAN PG: 0.69 MMHG
BH CV ECHO MEAS - MV V2 VTI: 32.5 CM
BH CV ECHO MEAS - MVA(VTI): 1.79 CM2
BH CV ECHO MEAS - PA ACC TIME: 0.16 SEC
BH CV ECHO MEAS - PA V2 MAX: 106.8 CM/SEC
BH CV ECHO MEAS - PULM A REVS DUR: 0.08 SEC
BH CV ECHO MEAS - PULM A REVS VEL: 20.1 CM/SEC
BH CV ECHO MEAS - PULM DIAS VEL: 59.5 CM/SEC
BH CV ECHO MEAS - PULM S/D: 0.77
BH CV ECHO MEAS - PULM SYS VEL: 45.6 CM/SEC
BH CV ECHO MEAS - RAP SYSTOLE: 3 MMHG
BH CV ECHO MEAS - RV MAX PG: 1.35 MMHG
BH CV ECHO MEAS - RV V1 MAX: 58.1 CM/SEC
BH CV ECHO MEAS - RV V1 VTI: 13.7 CM
BH CV ECHO MEAS - RVDD: 3.5 CM
BH CV ECHO MEAS - RVSP: 22.3 MMHG
BH CV ECHO MEAS - SV(LVOT): 57.9 ML
BH CV ECHO MEAS - SV(MOD-SP4): 42.2 ML
BH CV ECHO MEAS - TR MAX PG: 19.3 MMHG
BH CV ECHO MEAS - TR MAX VEL: 219.1 CM/SEC
BH CV REST NUCLEAR ISOTOPE DOSE: 10.1 MCI
BH CV STRESS COMMENTS STAGE 1: NORMAL
BH CV STRESS DOSE REGADENOSON STAGE 1: 0.4
BH CV STRESS DURATION MIN STAGE 1: 0
BH CV STRESS DURATION SEC STAGE 1: 10
BH CV STRESS NUCLEAR ISOTOPE DOSE: 32 MCI
BH CV STRESS PROTOCOL 1: NORMAL
BH CV STRESS RECOVERY BP: NORMAL MMHG
BH CV STRESS RECOVERY HR: 66 BPM
BH CV STRESS STAGE 1: 1
MAXIMAL PREDICTED HEART RATE: 154 BPM
STRESS BASELINE BP: NORMAL MMHG
STRESS BASELINE HR: 52 BPM
STRESS TARGET HR: 131 BPM

## 2023-11-02 PROCEDURE — 0 TECHNETIUM SESTAMIBI: Performed by: INTERNAL MEDICINE

## 2023-11-02 PROCEDURE — 93306 TTE W/DOPPLER COMPLETE: CPT

## 2023-11-02 PROCEDURE — 78452 HT MUSCLE IMAGE SPECT MULT: CPT

## 2023-11-02 PROCEDURE — 25010000002 REGADENOSON 0.4 MG/5ML SOLUTION: Performed by: INTERNAL MEDICINE

## 2023-11-02 PROCEDURE — 3079F DIAST BP 80-89 MM HG: CPT | Performed by: INTERNAL MEDICINE

## 2023-11-02 PROCEDURE — 1160F RVW MEDS BY RX/DR IN RCRD: CPT | Performed by: INTERNAL MEDICINE

## 2023-11-02 PROCEDURE — 99214 OFFICE O/P EST MOD 30 MIN: CPT | Performed by: INTERNAL MEDICINE

## 2023-11-02 PROCEDURE — A9500 TC99M SESTAMIBI: HCPCS | Performed by: INTERNAL MEDICINE

## 2023-11-02 PROCEDURE — 93017 CV STRESS TEST TRACING ONLY: CPT

## 2023-11-02 PROCEDURE — 1159F MED LIST DOCD IN RCRD: CPT | Performed by: INTERNAL MEDICINE

## 2023-11-02 PROCEDURE — 3077F SYST BP >= 140 MM HG: CPT | Performed by: INTERNAL MEDICINE

## 2023-11-02 RX ORDER — REGADENOSON 0.08 MG/ML
0.4 INJECTION, SOLUTION INTRAVENOUS
Status: COMPLETED | OUTPATIENT
Start: 2023-11-02 | End: 2023-11-02

## 2023-11-02 RX ADMIN — TECHNETIUM TC 99M SESTAMIBI 1 DOSE: 1 INJECTION INTRAVENOUS at 09:46

## 2023-11-02 RX ADMIN — TECHNETIUM TC 99M SESTAMIBI 1 DOSE: 1 INJECTION INTRAVENOUS at 08:35

## 2023-11-02 RX ADMIN — REGADENOSON 0.4 MG: 0.08 INJECTION, SOLUTION INTRAVENOUS at 09:47

## 2023-11-14 DIAGNOSIS — F32.A ANXIETY AND DEPRESSION: ICD-10-CM

## 2023-11-14 DIAGNOSIS — F41.9 ANXIETY AND DEPRESSION: ICD-10-CM

## 2023-11-14 RX ORDER — ATENOLOL 100 MG/1
TABLET ORAL
Qty: 90 TABLET | Refills: 0 | Status: SHIPPED | OUTPATIENT
Start: 2023-11-14

## 2023-11-14 RX ORDER — HYDRALAZINE HYDROCHLORIDE 25 MG/1
TABLET, FILM COATED ORAL
Qty: 270 TABLET | Refills: 0 | Status: SHIPPED | OUTPATIENT
Start: 2023-11-14

## 2023-11-17 ENCOUNTER — PRIOR AUTHORIZATION (OUTPATIENT)
Dept: FAMILY MEDICINE CLINIC | Facility: CLINIC | Age: 68
End: 2023-11-17

## 2023-11-17 ENCOUNTER — OFFICE VISIT (OUTPATIENT)
Dept: FAMILY MEDICINE CLINIC | Facility: CLINIC | Age: 68
End: 2023-11-17
Payer: MEDICARE

## 2023-11-17 VITALS
HEART RATE: 55 BPM | WEIGHT: 172 LBS | SYSTOLIC BLOOD PRESSURE: 148 MMHG | DIASTOLIC BLOOD PRESSURE: 80 MMHG | BODY MASS INDEX: 28.66 KG/M2 | OXYGEN SATURATION: 95 % | HEIGHT: 65 IN

## 2023-11-17 DIAGNOSIS — F32.A ANXIETY AND DEPRESSION: ICD-10-CM

## 2023-11-17 DIAGNOSIS — G47.33 OSA (OBSTRUCTIVE SLEEP APNEA): ICD-10-CM

## 2023-11-17 DIAGNOSIS — I10 PRIMARY HYPERTENSION: ICD-10-CM

## 2023-11-17 DIAGNOSIS — D23.5 DERMOID CYST OF SKIN OF BACK: ICD-10-CM

## 2023-11-17 DIAGNOSIS — M51.36 DDD (DEGENERATIVE DISC DISEASE), LUMBAR: ICD-10-CM

## 2023-11-17 DIAGNOSIS — R73.03 PREDIABETES: ICD-10-CM

## 2023-11-17 DIAGNOSIS — N39.498 OTHER URINARY INCONTINENCE: ICD-10-CM

## 2023-11-17 DIAGNOSIS — J44.9 CHRONIC OBSTRUCTIVE PULMONARY DISEASE, UNSPECIFIED COPD TYPE: ICD-10-CM

## 2023-11-17 DIAGNOSIS — F41.9 ANXIETY AND DEPRESSION: ICD-10-CM

## 2023-11-17 DIAGNOSIS — E78.2 MIXED HYPERLIPIDEMIA: ICD-10-CM

## 2023-11-17 DIAGNOSIS — Z23 NEED FOR INFLUENZA VACCINATION: Primary | ICD-10-CM

## 2023-11-17 RX ORDER — OXYCODONE AND ACETAMINOPHEN 10; 325 MG/1; MG/1
1 TABLET ORAL 2 TIMES DAILY PRN
Qty: 60 TABLET | Refills: 0 | Status: SHIPPED | OUTPATIENT
Start: 2023-11-17

## 2023-11-17 RX ORDER — TRIAMCINOLONE ACETONIDE 1 MG/G
1 CREAM TOPICAL 2 TIMES DAILY
Qty: 60 G | Refills: 1 | Status: SHIPPED | OUTPATIENT
Start: 2023-11-17

## 2023-11-17 NOTE — TELEPHONE ENCOUNTER
PA oxycodone Submitted on Covermymeds (Key: [id number]) - [id number]    Pharmacy able to provide one week supply but needed PA for additional days due to patient changing insurance. Pharmacy does not have supply currently to complete remaining 60. Hoping to get more next week.

## 2023-11-17 NOTE — PROGRESS NOTES
The ABCs of the Annual Wellness Visit  Subsequent Medicare Wellness Visit    Subjective    Mona Sheffield is a 67 y.o. female who presents for a Subsequent Medicare Wellness Visit.    The following portions of the patient's history were reviewed and   updated as appropriate: allergies, current medications, past family history, past medical history, past social history, past surgical history, and problem list.    Compared to one year ago, the patient feels her physical   health is the same.    Compared to one year ago, the patient feels her mental   health is better.    Recent Hospitalizations:  This patient has had a [facility] admission record on file within the last 365 days.    Current Medical Providers:  Patient Care Team:  Tereas Fernandez APRN as PCP - General (Nurse Practitioner)  Thomas Roa MD as Consulting Physician (Cardiology)  Barby Hopper MD as Consulting Physician (Cardiology)    Outpatient Medications Prior to Visit   Medication Sig Dispense Refill    albuterol (PROVENTIL) (2.5 MG/3ML) 0.083% nebulizer solution Take 2.5 mg by nebulization Every 4 (Four) Hours As Needed for Wheezing. 180 mL 0    albuterol sulfate  (90 Base) MCG/ACT inhaler albuterol sulfate HFA 90 mcg/actuation aerosol inhaler   INHALE 2 PUFFS BY MOUTH EVERY 6 HOURS AS NEEDED      atenolol (TENORMIN) 100 MG tablet TAKE 1 TABLET BY MOUTH EVERY DAY 90 tablet 0    baclofen (LIORESAL) 10 MG tablet TAKE 1 TABLET BY MOUTH EVERY NIGHT 30 tablet 1    Budeson-Glycopyrrol-Formoterol (Breztri Aerosphere) 160-9-4.8 MCG/ACT aerosol inhaler INHALE 2 PUFFS BY MOUTH TWICE DAILY 10.7 g 2    buPROPion XL (WELLBUTRIN XL) 150 MG 24 hr tablet TAKE 1 TABLET BY MOUTH DAILY 30 tablet 1    cloNIDine (CATAPRES) 0.2 MG tablet TAKE 1 TABLET BY MOUTH TWICE DAILY 180 tablet 1    dexlansoprazole (DEXILANT) 60 MG capsule Take 1 capsule by mouth Daily.      Diclofenac Sodium (VOLTAREN) 1 % gel gel Apply 4 g topically to the appropriate  area as directed 4 (Four) Times a Day As Needed (hand pain). 60 g 2    fluticasone (FLONASE) 50 MCG/ACT nasal spray fluticasone propionate 50 mcg/actuation nasal spray,suspension      hydrALAZINE (APRESOLINE) 25 MG tablet TAKE 1 TABLET BY MOUTH THREE TIMES DAILY 270 tablet 0    hydrOXYzine pamoate (VISTARIL) 25 MG capsule Take 1 capsule by mouth 3 (Three) Times a Day As Needed for Anxiety. 30 capsule 0    lidocaine (LIDODERM) 5 % Lidoderm 5 % topical patch   APPLY 1 PATCH BY TRANSDERMAL ROUTE ONCE DAILY (MAY WEAR UP TO 12HOURS.)      Lidocaine, Anorectal, (LMX 5) 5 % cream cream Apply nightly as needed. 60 g 1    losartan-hydrochlorothiazide (HYZAAR) 100-25 MG per tablet TAKE 1 TABLET BY MOUTH DAILY 90 tablet 0    sertraline (ZOLOFT) 100 MG tablet TAKE 1 AND 1/2 TABLETS BY MOUTH DAILY 135 tablet 0    oxyCODONE-acetaminophen (PERCOCET)  MG per tablet Take 1 tablet by mouth 2 (Two) Times a Day As Needed for Moderate Pain. 60 tablet 0    triamcinolone (KENALOG) 0.1 % cream Apply 1 application  topically to the appropriate area as directed 2 (Two) Times a Day. 60 g 1    traZODone (DESYREL) 50 MG tablet TAKE 1 TABLET BY MOUTH EVERY NIGHT (Patient not taking: Reported on 11/17/2023) 30 tablet 1     No facility-administered medications prior to visit.       Opioid medication/s are on active medication list.  and I have evaluated her active treatment plan and pain score trends (see table).  Vitals:    11/17/23 1409   PainSc: 0-No pain     I have reviewed the chart for potential of high risk medication and harmful drug interactions in the elderly.          Aspirin is not on active medication list.  Aspirin use is not indicated based on review of current medical condition/s. Risk of harm outweighs potential benefits.  .    Patient Active Problem List   Diagnosis    DDD (degenerative disc disease), lumbar    Screening mammogram for breast cancer    Preventative health care    Vertigo    Vitamin D deficiency    Dermoid  "cyst of skin of back    Primary hypertension    Anxiety and depression    Gastroesophageal reflux disease    Chronic obstructive pulmonary disease    Tension headache    Fever    Cough    Lower respiratory infection    Migraine without status migrainosus, not intractable    Chest pain    Encounter for observation for other suspected diseases and conditions ruled out     Pain of upper abdomen    Cognitive impairment    Contact dermatitis    Snoring    Daytime somnolence    Hypersomnia, unspecified    Arthralgia    Insomnia    Mixed hyperlipidemia    Prediabetes    MURTAZA (obstructive sleep apnea)    UTI (urinary tract infection)    Recurrent UTI    Urinary urgency    Urinary incontinence    Need for influenza vaccination     Advance Care Planning   Advance Care Planning     Advance Directive is not on file.  ACP discussion was held with the patient during this visit. Patient does not have an advance directive, information provided.     Objective    Vitals:    11/17/23 1409   BP: 148/80   BP Location: Left arm   Patient Position: Sitting   Cuff Size: Large Adult   Pulse: 55   SpO2: 95%   Weight: 78 kg (172 lb)   Height: 165.1 cm (65\")   PainSc: 0-No pain     Estimated body mass index is 28.62 kg/m² as calculated from the following:    Height as of this encounter: 165.1 cm (65\").    Weight as of this encounter: 78 kg (172 lb).           Does the patient have evidence of cognitive impairment? Yes    ATTENTION  What is the year: correct  What is the month of the year: correct  What is the day of the week?: correct  What is the date?: correct  MEMORY  Repeat address three times, only score third attempt: Gareth Garcia [address] [place] [address]: 3  HOW MANY ANIMALS DID THE PATIENT NAME  Verbal Fluency -- Animal Names (0-25): 17-21  CLOCK DRAWING  Clock Drawing: All Correct  MEMORY RECALL  Tell me what you remember about that name and address we were repeating at the beginning: 3  ACE TOTAL SCORE  Total ACE Score - " <25/30 strongly suggests cognitive impairment; <21/30 almost certainly shows dementia: 21            HEALTH RISK ASSESSMENT    Smoking Status:  Social History     Tobacco Use   Smoking Status Every Day    Packs/day: 0.50    Years: 50.00    Additional pack years: 0.00    Total pack years: 25.00    Types: Cigarettes    Passive exposure: Current   Smokeless Tobacco Never     Alcohol Consumption:  Social History     Substance and Sexual Activity   Alcohol Use Yes    Alcohol/week: 2.0 standard drinks of alcohol    Types: 1 Glasses of wine, 1 Cans of beer per week    Comment: OCC     Fall Risk Screen:    MARKIE Fall Risk Assessment was completed, and patient is at HIGH risk for falls. Assessment completed on:2023    Depression Screenin/17/2023     2:00 PM   PHQ-2/PHQ-9 Depression Screening   Little Interest or Pleasure in Doing Things 1-->several days   Feeling Down, Depressed or Hopeless 1-->several days   Trouble Falling or Staying Asleep, or Sleeping Too Much 0-->not at all   Feeling Tired or Having Little Energy 2-->more than half the days   Poor Appetite or Overeating 0-->not at all   Feeling Bad about Yourself - or that You are a Failure or Have Let Yourself or Your Family Down 0-->not at all   Trouble Concentrating on Things, Such as Reading the Newspaper or Watching Television 0-->not at all   Moving or Speaking So Slowly that Other People Could Have Noticed? Or the Opposite - Being So Fidgety 0-->not at all   Thoughts that You Would be Better Off Dead or of Hurting Yourself in Some Way 0-->not at all   PHQ-9: Brief Depression Severity Measure Score 4   If You Checked Off Any Problems, How Difficult Have These Problems Made It For You to Do Your Work, Take Care of Things at Home, or Get Along with Other People? not difficult at all       Health Habits and Functional and Cognitive Screenin/17/2023     2:00 PM   Functional & Cognitive Status   Do you have difficulty preparing food and  eating? No   Do you have difficulty bathing yourself, getting dressed or grooming yourself? No   Do you have difficulty using the toilet? No   Do you have difficulty moving around from place to place? No   Do you have trouble with steps or getting out of a bed or a chair? No   Do you need help using the phone?  No   Are you deaf or do you have serious difficulty hearing?  No   Do you need help to go to places out of walking distance? No   Do you need help shopping? No   Do you need help preparing meals?  No   Do you need help with housework?  No   Do you need help with laundry? No   Do you need help taking your medications? No   Do you need help managing money? No   Do you ever drive or ride in a car without wearing a seat belt? No   Have you felt unusual stress, anger or loneliness in the last month? No   Who do you live with? Sibling   If you need help, do you have trouble finding someone available to you? No   Have you been bothered in the last four weeks by sexual problems? No   Do you have difficulty concentrating, remembering or making decisions? Yes       Age-appropriate Screening Schedule:  Refer to the list below for future screening recommendations based on patient's age, sex and/or medical conditions. Orders for these recommended tests are listed in the plan section. The patient has been provided with a written plan.    Health Maintenance   Topic Date Due    DXA SCAN  Never done    TDAP/TD VACCINES (1 - Tdap) Never done    ZOSTER VACCINE (1 of 2) Never done    LUNG CANCER SCREENING  Never done    COVID-19 Vaccine (3 - 2023-24 season) 09/01/2023    LIPID PANEL  07/11/2024    BMI FOLLOWUP  07/11/2024    ANNUAL WELLNESS VISIT  11/17/2024    MAMMOGRAM  05/03/2025    COLORECTAL CANCER SCREENING  04/19/2032    HEPATITIS C SCREENING  Completed    INFLUENZA VACCINE  Completed    Pneumococcal Vaccine 65+  Completed                  CMS Preventative Services Quick Reference  Risk Factors Identified During  Encounter  Chronic Pain:  Percocet PRN severe pain  Depression/Dysphoria: Current medication is effective, no change recommended  Immunizations Discussed/Encouraged: Tdap, Influenza, Prevnar 20 (Pneumococcal 20-valent conjugate), Shingrix, COVID19, and RSV (Respiratory Syncytial Virus)  Tobacco Use/Dependance Risk (use dotphrase .tobaccocessation for documentation)  Dental Screening Recommended  Vision Screening Recommended  The above risks/problems have been discussed with the patient.  Pertinent information has been shared with the patient in the After Visit Summary.  An After Visit Summary and PPPS were made available to the patient.    Follow Up:   Next Medicare Wellness visit to be scheduled in 1 year.       Additional E&M Note during same encounter follows:  Patient has multiple medical problems which are significant and separately identifiable that require additional work above and beyond the Medicare Wellness Visit.      Chief Complaint  Medicare Wellness-subsequent and Follow-up (4 month follow up HTN/COPD)    Subjective        HPI  Mona Sheffield is also being seen today for HTN and COPD.     Patent has COPD, prescribed Brezri and albuterol PRN dyspnea. She reports symptoms are stable, has no acute complaints.  Patient has MURTAZA, previously reported that she was tolerating her CPAP poorly.  An order was placed for new mask fitting - patient reportedly not contacted.    Patient has hypertension, taking Atenolol 100 mg daily, Hydralazine 25 mg TID, Hyzaar daily and Clonidine BID.  Blood pressure is stable.  Patient does have intermittent chest pain, referred to cardiology last year for further evaluation but canceled appointment.  She was referred back to cardiology and has since seen Dr. Roa.  Stress test and echocardiogram were ordered.  Stress test was negative for ischemia.  Echocardiogram showed normal left ventricular systolic function, diastolic function consistent with pseudonormalization and there  "was dilation of the left atrial cavity.  Patient is scheduled for follow-up in December.    Patient referred to Urology due to recurrent UTI. She reports having episodes where she spontaneously empties her bladder. Patient has not followed up with Urology.         Objective   Vital Signs:  /80 (BP Location: Left arm, Patient Position: Sitting, Cuff Size: Large Adult)   Pulse 55   Ht 165.1 cm (65\")   Wt 78 kg (172 lb)   SpO2 95%   BMI 28.62 kg/m²     Physical Exam  Constitutional:       Appearance: Normal appearance.   HENT:      Head: Normocephalic.   Cardiovascular:      Rate and Rhythm: Normal rate and regular rhythm.   Pulmonary:      Effort: Pulmonary effort is normal.      Breath sounds: Normal breath sounds.   Abdominal:      General: Abdomen is flat. Bowel sounds are normal.      Palpations: Abdomen is soft.   Musculoskeletal:         General: Normal range of motion.      Cervical back: Neck supple.      Right lower leg: No edema.      Left lower leg: No edema.   Skin:     General: Skin is warm and dry.   Neurological:      Mental Status: She is alert and oriented to person, place, and time.      Gait: Gait is intact.   Psychiatric:         Attention and Perception: Attention normal.         Mood and Affect: Mood normal.         Speech: Speech normal.            CMP          8/11/2023    04:55 8/11/2023    20:57 8/12/2023    03:35 8/13/2023    08:00   CMP   Glucose 110   106  129    BUN 42   27  15    Creatinine 1.05   0.73  0.78    EGFR 58.7   90.8  83.9    Sodium 141   141  138    Potassium 3.3  4.1  4.4  4.0    Chloride 104   107  100    Calcium 8.4   8.7  9.3    BUN/Creatinine Ratio 40.0   37.0  19.2    Anion Gap 10.0   7.0  11.0      CBC          8/11/2023    04:55 8/12/2023    03:35 8/13/2023    08:00   CBC   WBC 8.70  8.00  10.70    RBC 3.92  3.81  3.87    Hemoglobin 11.5  11.2  11.4    Hematocrit 34.3  33.4  34.3    MCV 87.5  87.7  88.4    MCH 29.4  29.4  29.3    MCHC 33.6  33.5  33.2  "   RDW 13.5  13.3  13.1    Platelets 150  146  172      Lipid Panel          2/14/2023    15:12 7/11/2023    11:26   Lipid Panel   Total Cholesterol 242  185    Triglycerides 377  399    HDL Cholesterol 37  31    VLDL Cholesterol 69  66    LDL Cholesterol  136  88    LDL/HDL Ratio 3.50  2.39      TSH          2/14/2023    15:12   TSH   TSH 0.985      Most Recent A1C          7/11/2023    11:26   HGBA1C Most Recent   Hemoglobin A1C 6.20                 Assessment and Plan   Diagnoses and all orders for this visit:    1. Need for influenza vaccination (Primary)  -     Fluzone High-Dose 65+yrs ([id number])    2. Chronic obstructive pulmonary disease, unspecified COPD type    3. Anxiety and depression    4. Other urinary incontinence  Assessment & Plan:  Follow up with Urology      5. Primary hypertension    6. Mixed hyperlipidemia    7. Prediabetes    8. DDD (degenerative disc disease), lumbar  -     oxyCODONE-acetaminophen (PERCOCET)  MG per tablet; Take 1 tablet by mouth 2 (Two) Times a Day As Needed for Moderate Pain.  Dispense: 60 tablet; Refill: 0    9. MURTAZA (obstructive sleep apnea)  Assessment & Plan:  [facility]      10. Dermoid cyst of skin of back  Assessment & Plan:  Schedule with Dermatology      Other orders  -     triamcinolone (KENALOG) 0.1 % cream; Apply 1 application  topically to the appropriate area as directed 2 (Two) Times a Day.  Dispense: 60 g; Refill: 1           I spent 30 minutes caring for Mona on this date of service. This time includes time spent by me in the following activities:preparing for the visit, reviewing tests, obtaining and/or reviewing a separately obtained history, performing a medically appropriate examination and/or evaluation , counseling and educating the patient/family/caregiver, ordering medications, tests, or procedures, documenting information in the medical record, and care coordination  Follow Up   Return in about 4  months (around 3/17/2024) for COPD.  Patient was given instructions and counseling regarding her condition or for health maintenance advice. Please see specific information pulled into the AVS if appropriate.

## 2023-11-17 NOTE — PROGRESS NOTES
Encounter Date:12/14/2023  Last seen 11/2/2023      Patient ID: Mona Sheffield is a 67 y.o. female.      Chief Complaint:  Shortness of breath  Chest discomfort  Hypertension  Dyslipidemia        History of Present Illness  Occasional palpitations.  Patient has not seen Dr. Hopepr.  Missed her appointment.    Since I have last seen, the patient has been without any chest discomfort ,shortness of breath,, dizziness or syncope.  Denies having any headache ,abdominal pain ,nausea, vomiting , diarrhea constipation, loss of weight or loss of appetite.  Denies having any excessive bruising ,hematuria or blood in the stool.    Review of all systems negative except as indicated.    Reviewed ROS.  Assessment and Plan      ]]]]]]]]]]]]]]]]]]]]]]  History  ==========  -Chest discomfort and shortness of breath     Stress Eqpecbw-yvlzwy-00/2/2023.     Echocardiogram 11/2/2023.    Left ventricular systolic function is normal. Calculated left ventricular EF = 63% Left ventricular ejection fraction appears to be 61 - 65%.    Left ventricular diastolic function is consistent with (grade II w/high LAP) pseudonormalization.    The left atrial cavity is dilated.    Estimated right ventricular systolic pressure from tricuspid regurgitation is normal (<35 mmHg).    No significant valvular abnormalities noted.     - Hypertension dyslipidemia obstructive sleep apnea     - Status post hysterectomy appendectomy cholecystectomy spinal cord stimulator placement tonsillectomy     - Family history of hypertension heart failure     - Smoker     - No known allergies     ==============  Plan  ===============  Chest discomfort and shortness of breath.  Patient has multiple coronary risk factors.  EKG showed spinal cord stimulator artifact sinus bradycardia 49/min no ectopy  Prolonged QT interval.-9/14/2023.     Stress Cardiolite test-normal-as above  Echocardiogram-normal as above no need for cardiac catheterization.     Hypertension-blood  pressure 140/80.  Borderline elevation of systolic blood pressure.  Maintain blood pressure log.  Patient is on atenolol clonidine hydralazine losartan hydrochlorothiazide     Dyslipidemia-on atorvastatin     Prolonged QT interval.     Smoker-abstinence from smoking.     Medications were reviewed and updated.     Patient has prolonged QT interval.  Patient is on sertraline.  Patient is asymptomatic  We will have electrophysiology consultation regarding options.  Patient had an appointment but missed due to her brothers illness.  Reschedule appointment with him again.    Follow-up in the office in 6 weeks with EKG..     Further plan will depend on patient's progress.     Reviewed and updated-12/14/2023  [[[[[[[[[[[[[[[[[[[[[[[              Diagnosis Plan   1. Shortness of breath        2. Chest discomfort        3. Dyslipidemia        4. Essential hypertension        LAB RESULTS (LAST 7 DAYS)    CBC        BMP        CMP         BNP        TROPONIN        CoAg        Creatinine Clearance  CrCl cannot be calculated (Patient's most recent lab result is older than the maximum 30 days allowed.).    ABG        Radiology  No radiology results for the last day                The following portions of the patient's history were reviewed and updated as appropriate: allergies, current medications, past family history, past medical history, past social history, past surgical history, and problem list.    Review of Systems   Constitutional: Negative for malaise/fatigue.   Cardiovascular:  Negative for chest pain, dyspnea on exertion, leg swelling and palpitations.   Respiratory:  Positive for cough. Negative for shortness of breath.    Gastrointestinal:  Negative for abdominal pain, nausea and vomiting.   Neurological:  Negative for dizziness, focal weakness, headaches, light-headedness and numbness.   All other systems reviewed and are negative.      Current Outpatient Medications:     albuterol (PROVENTIL) (2.5 MG/3ML) 0.083%  nebulizer solution, Take 2.5 mg by nebulization Every 4 (Four) Hours As Needed for Wheezing., Disp: 180 mL, Rfl: 0    albuterol sulfate  (90 Base) MCG/ACT inhaler, albuterol sulfate HFA 90 mcg/actuation aerosol inhaler  INHALE 2 PUFFS BY MOUTH EVERY 6 HOURS AS NEEDED, Disp: , Rfl:     atenolol (TENORMIN) 100 MG tablet, TAKE 1 TABLET BY MOUTH EVERY DAY, Disp: 90 tablet, Rfl: 0    baclofen (LIORESAL) 10 MG tablet, TAKE 1 TABLET BY MOUTH EVERY NIGHT, Disp: 30 tablet, Rfl: 1    Budeson-Glycopyrrol-Formoterol (Breztri Aerosphere) 160-9-4.8 MCG/ACT aerosol inhaler, INHALE 2 PUFFS BY MOUTH TWICE DAILY, Disp: 10.7 g, Rfl: 2    buPROPion XL (WELLBUTRIN XL) 150 MG 24 hr tablet, TAKE 1 TABLET BY MOUTH DAILY, Disp: 30 tablet, Rfl: 1    cloNIDine (CATAPRES) 0.2 MG tablet, TAKE 1 TABLET BY MOUTH TWICE DAILY, Disp: 180 tablet, Rfl: 1    dexlansoprazole (DEXILANT) 60 MG capsule, Take 1 capsule by mouth Daily., Disp: , Rfl:     Diclofenac Sodium (VOLTAREN) 1 % gel gel, Apply 4 g topically to the appropriate area as directed 4 (Four) Times a Day As Needed (hand pain)., Disp: 60 g, Rfl: 2    fluticasone (FLONASE) 50 MCG/ACT nasal spray, fluticasone propionate 50 mcg/actuation nasal spray,suspension, Disp: , Rfl:     hydrALAZINE (APRESOLINE) 25 MG tablet, TAKE 1 TABLET BY MOUTH THREE TIMES DAILY, Disp: 270 tablet, Rfl: 0    hydrOXYzine pamoate (VISTARIL) 25 MG capsule, Take 1 capsule by mouth 3 (Three) Times a Day As Needed for Anxiety., Disp: 30 capsule, Rfl: 0    lidocaine (LIDODERM) 5 %, Lidoderm 5 % topical patch  APPLY 1 PATCH BY TRANSDERMAL ROUTE ONCE DAILY (MAY WEAR UP TO 12HOURS.), Disp: , Rfl:     Lidocaine, Anorectal, (LMX 5) 5 % cream cream, Apply nightly as needed., Disp: 60 g, Rfl: 1    losartan-hydrochlorothiazide (HYZAAR) 100-25 MG per tablet, TAKE 1 TABLET BY MOUTH DAILY, Disp: 90 tablet, Rfl: 0    oxyCODONE-acetaminophen (PERCOCET)  MG per tablet, Take 1 tablet by mouth 2 (Two) Times a Day As Needed for  Moderate Pain., Disp: 60 tablet, Rfl: 0    sertraline (ZOLOFT) 100 MG tablet, TAKE 1 AND 1/2 TABLETS BY MOUTH DAILY, Disp: 135 tablet, Rfl: 0    traZODone (DESYREL) 50 MG tablet, TAKE 1 TABLET BY MOUTH EVERY NIGHT, Disp: 30 tablet, Rfl: 1    triamcinolone (KENALOG) 0.1 % cream, Apply 1 application  topically to the appropriate area as directed 2 (Two) Times a Day., Disp: 60 g, Rfl: 1    No Known Allergies    Family History   Problem Relation Age of Onset    Hypertension Mother     Heart failure Mother     Hypertension Father     Heart disease Father     Heart failure Brother        Past Surgical History:   Procedure Laterality Date    APPENDECTOMY      CHOLECYSTECTOMY      HYSTERECTOMY      SPINAL CORD STIMULATOR TRIAL W/ LAMINOTOMY      TONSILLECTOMY         Past Medical History:   Diagnosis Date    Arthritis     Asthma     Depression     Hypertension     Injury of back     Pneumonia        Family History   Problem Relation Age of Onset    Hypertension Mother     Heart failure Mother     Hypertension Father     Heart disease Father     Heart failure Brother        Social History     Socioeconomic History    Marital status:    Tobacco Use    Smoking status: Every Day     Packs/day: 0.50     Years: 50.00     Additional pack years: 0.00     Total pack years: 25.00     Types: Cigarettes     Passive exposure: Current    Smokeless tobacco: Never   Vaping Use    Vaping Use: Never used   Substance and Sexual Activity    Alcohol use: Yes     Alcohol/week: 2.0 standard drinks of alcohol     Types: 1 Glasses of wine, 1 Cans of beer per week     Comment: OCC    Drug use: Yes     Types: Marijuana     Comment: medical marijuana    Sexual activity: Defer         Procedures      Objective:       Physical Exam    There were no vitals taken for this visit.  The patient is alert, oriented and in no distress.    Vital signs as noted above.  Exogenous obesity (BMI 29)    Head and neck revealed no carotid bruits or jugular venous  distension.  No thyromegaly or lymphadenopathy is present.    Lungs clear.  No wheezing.  Breath sounds are normal bilaterally.    Heart normal first and second heart sounds.  No murmur..  No pericardial rub is present.  No gallop is present.    Abdomen soft and nontender.  No organomegaly is present.    Extremities revealed good peripheral pulses without any pedal edema.    Skin warm and dry.    Musculoskeletal system is grossly normal.    CNS grossly normal.    Reviewed and updated.

## 2023-11-30 DIAGNOSIS — M51.36 DDD (DEGENERATIVE DISC DISEASE), LUMBAR: ICD-10-CM

## 2023-11-30 RX ORDER — OXYCODONE AND ACETAMINOPHEN 10; 325 MG/1; MG/1
1 TABLET ORAL 2 TIMES DAILY PRN
Qty: 60 TABLET | Refills: 0 | OUTPATIENT
Start: 2023-11-30

## 2023-11-30 NOTE — TELEPHONE ENCOUNTER
PATIENT WAS ONLY GIVEN A SMALL AMOUNT FOR THE FIRST FILL AND LOST THE REMAINING PILLS     CAN YOU REFILL THIS MEDICATION FOR THE PATIENT     Caller: Mona Sheffield GITA    Relationship: Self    Best call back number:     [phone] (Mobile)       Requested Prescriptions:   Requested Prescriptions     Pending Prescriptions Disp Refills    oxyCODONE-acetaminophen (PERCOCET)  MG per tablet 60 tablet 0     Sig: Take 1 tablet by mouth 2 (Two) Times a Day As Needed for Moderate Pain.        Pharmacy where request should be sent: [facility] #[id number] [address] [place] [address] - [phone]  - [phone] FX     Last office visit with prescribing clinician: 11/17/2023   Last telemedicine visit with prescribing clinician: Visit date not found   Next office visit with prescribing clinician: 3/26/2024     Additional details provided by patient:     Does the patient have less than a 3 day supply:  [x] Yes  [] No    Would you like a call back once the refill request has been completed: [] Yes [] No    If the office needs to give you a call back, can they leave a voicemail: [] Yes [] No    Farrah Granger Rep   11/30/23 15:50 EST

## 2023-11-30 NOTE — TELEPHONE ENCOUNTER
Caller: Mona Sheffield    Relationship: Self    Best call back number: [phone]    Requested Prescriptions:   Requested Prescriptions     Pending Prescriptions Disp Refills    oxyCODONE-acetaminophen (PERCOCET)  MG per tablet 60 tablet 0     Sig: Take 1 tablet by mouth 2 (Two) Times a Day As Needed for Moderate Pain.        Pharmacy where request should be sent: [facility] #[id number] [place] [address] AT [place] [address] - [phone]  - [phone]      Last office visit with prescribing clinician: 11/17/2023   Last telemedicine visit with prescribing clinician: Visit date not found   Next office visit with prescribing clinician: 3/26/2024     Additional details provided by patient: OUT    Does the patient have less than a 3 day supply:  [x] Yes  [] No    Would you like a call back once the refill request has been completed: [] Yes [] No    If the office needs to give you a call back, can they leave a voicemail: [] Yes [] No    Don Collins   11/30/23 15:07 EST

## 2023-12-01 RX ORDER — OXYCODONE AND ACETAMINOPHEN 10; 325 MG/1; MG/1
1 TABLET ORAL 2 TIMES DAILY PRN
Qty: 60 TABLET | Refills: 0 | Status: SHIPPED | OUTPATIENT
Start: 2023-12-01

## 2023-12-01 NOTE — TELEPHONE ENCOUNTER
Per patient she was only given 14 pills per her insurance would not over the full month supply. The rest of the script was voided because she got the shorter day supply. She is now all out and asking for the full month. She said she is not sure if it will need authorization or not.

## 2023-12-12 RX ORDER — CLONIDINE HYDROCHLORIDE 0.2 MG/1
TABLET ORAL
Qty: 180 TABLET | Refills: 1 | Status: SHIPPED | OUTPATIENT
Start: 2023-12-12

## 2023-12-12 RX ORDER — SERTRALINE HYDROCHLORIDE 100 MG/1
TABLET, FILM COATED ORAL
Qty: 135 TABLET | Refills: 0 | Status: SHIPPED | OUTPATIENT
Start: 2023-12-12

## 2023-12-12 RX ORDER — LOSARTAN POTASSIUM AND HYDROCHLOROTHIAZIDE 25; 100 MG/1; MG/1
1 TABLET ORAL DAILY
Qty: 90 TABLET | Refills: 0 | Status: SHIPPED | OUTPATIENT
Start: 2023-12-12

## 2023-12-14 ENCOUNTER — OFFICE VISIT (OUTPATIENT)
Dept: CARDIOLOGY | Facility: CLINIC | Age: 68
End: 2023-12-14
Payer: MEDICARE

## 2023-12-14 VITALS
WEIGHT: 173 LBS | OXYGEN SATURATION: 94 % | DIASTOLIC BLOOD PRESSURE: 80 MMHG | BODY MASS INDEX: 28.82 KG/M2 | HEART RATE: 69 BPM | HEIGHT: 65 IN | SYSTOLIC BLOOD PRESSURE: 140 MMHG

## 2023-12-14 DIAGNOSIS — R07.89 CHEST DISCOMFORT: ICD-10-CM

## 2023-12-14 DIAGNOSIS — R06.02 SHORTNESS OF BREATH: Primary | ICD-10-CM

## 2023-12-14 DIAGNOSIS — I10 ESSENTIAL HYPERTENSION: ICD-10-CM

## 2023-12-14 DIAGNOSIS — E78.5 DYSLIPIDEMIA: ICD-10-CM

## 2023-12-14 PROCEDURE — 3079F DIAST BP 80-89 MM HG: CPT | Performed by: INTERNAL MEDICINE

## 2023-12-14 PROCEDURE — 1160F RVW MEDS BY RX/DR IN RCRD: CPT | Performed by: INTERNAL MEDICINE

## 2023-12-14 PROCEDURE — 1159F MED LIST DOCD IN RCRD: CPT | Performed by: INTERNAL MEDICINE

## 2023-12-14 PROCEDURE — 99214 OFFICE O/P EST MOD 30 MIN: CPT | Performed by: INTERNAL MEDICINE

## 2023-12-14 PROCEDURE — 3077F SYST BP >= 140 MM HG: CPT | Performed by: INTERNAL MEDICINE

## 2023-12-14 RX ORDER — ATORVASTATIN CALCIUM 40 MG/1
TABLET, FILM COATED ORAL
COMMUNITY
Start: 2023-12-12

## 2024-01-04 RX ORDER — ATENOLOL 100 MG/1
TABLET ORAL
Qty: 90 TABLET | Refills: 0 | Status: SHIPPED | OUTPATIENT
Start: 2024-01-04

## 2024-02-02 DIAGNOSIS — M51.36 DDD (DEGENERATIVE DISC DISEASE), LUMBAR: ICD-10-CM

## 2024-02-02 RX ORDER — OXYCODONE AND ACETAMINOPHEN 10; 325 MG/1; MG/1
1 TABLET ORAL 2 TIMES DAILY PRN
Qty: 60 TABLET | Refills: 0 | Status: SHIPPED | OUTPATIENT
Start: 2024-02-02

## 2024-02-02 NOTE — TELEPHONE ENCOUNTER
Caller: Mona Sheffield    Relationship: Self  Best call back number: [id number] [place] [address] [place] [address] - [phone] [place] [phone] FX     Requested Prescriptions:   Requested Prescriptions     Pending Prescriptions Disp Refills    oxyCODONE-acetaminophen (PERCOCET)  MG per tablet 60 tablet 0     Sig: Take 1 tablet by mouth 2 (Two) Times a Day As Needed for Moderate Pain.        Pharmacy where request should be sent: [id number] - [place] [address] [place] [address] - [phone] [place] [phone] FX     Last office visit with prescribing clinician: 11/17/2023   Last telemedicine visit with prescribing clinician: Visit date not found   Next office visit with prescribing clinician: 3/26/2024         Does the patient have less than a 3 day supply:  [] Yes  [x] No    Would you like a call back once the refill request has been completed: [] Yes [x] No    If the office needs to give you a call back, can they leave a voicemail: [] Yes [x] No    Farrah Davis Rep   02/02/24 13:31 EST

## 2024-02-13 ENCOUNTER — TELEPHONE (OUTPATIENT)
Dept: FAMILY MEDICINE CLINIC | Facility: CLINIC | Age: 69
End: 2024-02-13
Payer: MEDICARE

## 2024-02-14 ENCOUNTER — CLINICAL SUPPORT (OUTPATIENT)
Dept: FAMILY MEDICINE CLINIC | Facility: CLINIC | Age: 69
End: 2024-02-14
Payer: MEDICARE

## 2024-02-14 ENCOUNTER — TELEPHONE (OUTPATIENT)
Dept: FAMILY MEDICINE CLINIC | Facility: CLINIC | Age: 69
End: 2024-02-14

## 2024-02-14 DIAGNOSIS — R30.0 DYSURIA: Primary | ICD-10-CM

## 2024-02-14 LAB
BILIRUB BLD-MCNC: NEGATIVE MG/DL
CLARITY, POC: CLEAR
COLOR UR: YELLOW
GLUCOSE UR STRIP-MCNC: NEGATIVE MG/DL
KETONES UR QL: NEGATIVE
LEUKOCYTE EST, POC: NEGATIVE
NITRITE UR-MCNC: NEGATIVE MG/ML
PH UR: 5.5 [PH] (ref 5–8)
PROT UR STRIP-MCNC: ABNORMAL MG/DL
RBC # UR STRIP: NEGATIVE /UL
SP GR UR: 1.02 (ref 1–1.03)
UROBILINOGEN UR QL: NORMAL

## 2024-02-14 PROCEDURE — 87086 URINE CULTURE/COLONY COUNT: CPT | Performed by: NURSE PRACTITIONER

## 2024-02-14 RX ORDER — NITROFURANTOIN 25; 75 MG/1; MG/1
100 CAPSULE ORAL 2 TIMES DAILY
Qty: 14 CAPSULE | Refills: 0 | Status: SHIPPED | OUTPATIENT
Start: 2024-02-14

## 2024-02-16 LAB — BACTERIA SPEC AEROBE CULT: NO GROWTH

## 2024-02-26 ENCOUNTER — APPOINTMENT (OUTPATIENT)
Dept: CT IMAGING | Facility: HOSPITAL | Age: 69
DRG: 696 | End: 2024-02-26
Payer: MEDICARE

## 2024-02-26 ENCOUNTER — HOSPITAL ENCOUNTER (INPATIENT)
Facility: HOSPITAL | Age: 69
LOS: 2 days | Discharge: HOME | DRG: 696 | End: 2024-03-01
Attending: EMERGENCY MEDICINE | Admitting: EMERGENCY MEDICINE
Payer: MEDICARE

## 2024-02-26 DIAGNOSIS — R33.8 ACUTE URINARY RETENTION: ICD-10-CM

## 2024-02-26 DIAGNOSIS — R10.30 LOWER ABDOMINAL PAIN: Primary | ICD-10-CM

## 2024-02-26 PROBLEM — R33.9 URINARY RETENTION: Status: ACTIVE | Noted: 2024-02-26

## 2024-02-26 LAB
ALBUMIN SERPL-MCNC: 4.3 G/DL (ref 3.5–5.2)
ALBUMIN/GLOB SERPL: 1.4 G/DL
ALP SERPL-CCNC: 63 U/L (ref 39–117)
ALT SERPL W P-5'-P-CCNC: 20 U/L (ref 1–33)
ANION GAP SERPL CALCULATED.3IONS-SCNC: 12 MMOL/L (ref 5–15)
AST SERPL-CCNC: 24 U/L (ref 1–32)
BACTERIA UR QL AUTO: NORMAL /HPF
BASOPHILS # BLD AUTO: 0.1 10*3/MM3 (ref 0–0.2)
BASOPHILS NFR BLD AUTO: 0.7 % (ref 0–1.5)
BILIRUB SERPL-MCNC: 0.5 MG/DL (ref 0–1.2)
BILIRUB UR QL STRIP: NEGATIVE
BUN SERPL-MCNC: 22 MG/DL (ref 8–23)
BUN/CREAT SERPL: 20.4 (ref 7–25)
CALCIUM SPEC-SCNC: 9.9 MG/DL (ref 8.6–10.5)
CHLORIDE SERPL-SCNC: 102 MMOL/L (ref 98–107)
CLARITY UR: ABNORMAL
CO2 SERPL-SCNC: 27 MMOL/L (ref 22–29)
COLOR UR: YELLOW
CREAT SERPL-MCNC: 1.08 MG/DL (ref 0.57–1)
DEPRECATED RDW RBC AUTO: 43.3 FL (ref 37–54)
EGFRCR SERPLBLD CKD-EPI 2021: 56.4 ML/MIN/1.73
EOSINOPHIL # BLD AUTO: 0.1 10*3/MM3 (ref 0–0.4)
EOSINOPHIL NFR BLD AUTO: 1.2 % (ref 0.3–6.2)
ERYTHROCYTE [DISTWIDTH] IN BLOOD BY AUTOMATED COUNT: 13.8 % (ref 12.3–15.4)
GLOBULIN UR ELPH-MCNC: 3 GM/DL
GLUCOSE SERPL-MCNC: 96 MG/DL (ref 65–99)
GLUCOSE UR STRIP-MCNC: NEGATIVE MG/DL
HCT VFR BLD AUTO: 41.5 % (ref 34–46.6)
HGB BLD-MCNC: 13.7 G/DL (ref 12–15.9)
HGB UR QL STRIP.AUTO: NEGATIVE
HYALINE CASTS UR QL AUTO: NORMAL /LPF
KETONES UR QL STRIP: NEGATIVE
LEUKOCYTE ESTERASE UR QL STRIP.AUTO: NEGATIVE
LIPASE SERPL-CCNC: 45 U/L (ref 13–60)
LYMPHOCYTES # BLD AUTO: 2.2 10*3/MM3 (ref 0.7–3.1)
LYMPHOCYTES NFR BLD AUTO: 23.9 % (ref 19.6–45.3)
MCH RBC QN AUTO: 29.7 PG (ref 26.6–33)
MCHC RBC AUTO-ENTMCNC: 33.2 G/DL (ref 31.5–35.7)
MCV RBC AUTO: 89.5 FL (ref 79–97)
MONOCYTES # BLD AUTO: 0.6 10*3/MM3 (ref 0.1–0.9)
MONOCYTES NFR BLD AUTO: 6.6 % (ref 5–12)
NEUTROPHILS NFR BLD AUTO: 6.2 10*3/MM3 (ref 1.7–7)
NEUTROPHILS NFR BLD AUTO: 67.6 % (ref 42.7–76)
NITRITE UR QL STRIP: NEGATIVE
NRBC BLD AUTO-RTO: 0 /100 WBC (ref 0–0.2)
PH UR STRIP.AUTO: 6 [PH] (ref 5–8)
PLATELET # BLD AUTO: 166 10*3/MM3 (ref 140–450)
PMV BLD AUTO: 9.5 FL (ref 6–12)
POTASSIUM SERPL-SCNC: 3.8 MMOL/L (ref 3.5–5.2)
PROT SERPL-MCNC: 7.3 G/DL (ref 6–8.5)
PROT UR QL STRIP: ABNORMAL
RBC # BLD AUTO: 4.64 10*6/MM3 (ref 3.77–5.28)
RBC # UR STRIP: NORMAL /HPF
REF LAB TEST METHOD: NORMAL
SODIUM SERPL-SCNC: 141 MMOL/L (ref 136–145)
SP GR UR STRIP: 1.01 (ref 1–1.03)
SQUAMOUS #/AREA URNS HPF: NORMAL /HPF
STARCH GRANULES URNS QL MICRO: NORMAL /HPF
UROBILINOGEN UR QL STRIP: ABNORMAL
WBC # UR STRIP: NORMAL /HPF
WBC NRBC COR # BLD AUTO: 9.1 10*3/MM3 (ref 3.4–10.8)

## 2024-02-26 PROCEDURE — 25510000001 IOPAMIDOL PER 1 ML: Performed by: NURSE PRACTITIONER

## 2024-02-26 PROCEDURE — G0378 HOSPITAL OBSERVATION PER HR: HCPCS

## 2024-02-26 PROCEDURE — 83690 ASSAY OF LIPASE: CPT | Performed by: NURSE PRACTITIONER

## 2024-02-26 PROCEDURE — 81001 URINALYSIS AUTO W/SCOPE: CPT | Performed by: NURSE PRACTITIONER

## 2024-02-26 PROCEDURE — 80053 COMPREHEN METABOLIC PANEL: CPT | Performed by: NURSE PRACTITIONER

## 2024-02-26 PROCEDURE — 85025 COMPLETE CBC W/AUTO DIFF WBC: CPT | Performed by: NURSE PRACTITIONER

## 2024-02-26 PROCEDURE — 74177 CT ABD & PELVIS W/CONTRAST: CPT

## 2024-02-26 PROCEDURE — 99285 EMERGENCY DEPT VISIT HI MDM: CPT

## 2024-02-26 RX ORDER — SODIUM CHLORIDE 0.9 % (FLUSH) 0.9 %
10 SYRINGE (ML) INJECTION AS NEEDED
Status: DISCONTINUED | OUTPATIENT
Start: 2024-02-26 | End: 2024-03-01 | Stop reason: HOSPADM

## 2024-02-26 RX ORDER — HYDROCODONE BITARTRATE AND ACETAMINOPHEN 5; 325 MG/1; MG/1
1 TABLET ORAL ONCE
Status: COMPLETED | OUTPATIENT
Start: 2024-02-26 | End: 2024-02-26

## 2024-02-26 RX ADMIN — IOPAMIDOL 100 ML: 755 INJECTION, SOLUTION INTRAVENOUS at 17:59

## 2024-02-26 RX ADMIN — HYDROCODONE BITARTRATE AND ACETAMINOPHEN 1 TABLET: 5; 325 TABLET ORAL at 21:59

## 2024-02-27 LAB
ANION GAP SERPL CALCULATED.3IONS-SCNC: 11 MMOL/L (ref 5–15)
BASOPHILS # BLD AUTO: 0.1 10*3/MM3 (ref 0–0.2)
BASOPHILS NFR BLD AUTO: 0.6 % (ref 0–1.5)
BUN SERPL-MCNC: 29 MG/DL (ref 8–23)
BUN/CREAT SERPL: 26.9 (ref 7–25)
CALCIUM SPEC-SCNC: 9.4 MG/DL (ref 8.6–10.5)
CHLORIDE SERPL-SCNC: 103 MMOL/L (ref 98–107)
CO2 SERPL-SCNC: 28 MMOL/L (ref 22–29)
CREAT SERPL-MCNC: 1.08 MG/DL (ref 0.57–1)
DEPRECATED RDW RBC AUTO: 44.6 FL (ref 37–54)
EGFRCR SERPLBLD CKD-EPI 2021: 56.4 ML/MIN/1.73
EOSINOPHIL # BLD AUTO: 0.2 10*3/MM3 (ref 0–0.4)
EOSINOPHIL NFR BLD AUTO: 1.9 % (ref 0.3–6.2)
ERYTHROCYTE [DISTWIDTH] IN BLOOD BY AUTOMATED COUNT: 13.9 % (ref 12.3–15.4)
GLUCOSE SERPL-MCNC: 98 MG/DL (ref 65–99)
HCT VFR BLD AUTO: 37.3 % (ref 34–46.6)
HGB BLD-MCNC: 12.6 G/DL (ref 12–15.9)
LYMPHOCYTES # BLD AUTO: 2.4 10*3/MM3 (ref 0.7–3.1)
LYMPHOCYTES NFR BLD AUTO: 27.1 % (ref 19.6–45.3)
MCH RBC QN AUTO: 29.6 PG (ref 26.6–33)
MCHC RBC AUTO-ENTMCNC: 33.7 G/DL (ref 31.5–35.7)
MCV RBC AUTO: 87.8 FL (ref 79–97)
MONOCYTES # BLD AUTO: 0.6 10*3/MM3 (ref 0.1–0.9)
MONOCYTES NFR BLD AUTO: 6.6 % (ref 5–12)
NEUTROPHILS NFR BLD AUTO: 5.8 10*3/MM3 (ref 1.7–7)
NEUTROPHILS NFR BLD AUTO: 63.8 % (ref 42.7–76)
NRBC BLD AUTO-RTO: 0.1 /100 WBC (ref 0–0.2)
PLATELET # BLD AUTO: 140 10*3/MM3 (ref 140–450)
PMV BLD AUTO: 10.1 FL (ref 6–12)
POTASSIUM SERPL-SCNC: 3.5 MMOL/L (ref 3.5–5.2)
RBC # BLD AUTO: 4.25 10*6/MM3 (ref 3.77–5.28)
SODIUM SERPL-SCNC: 142 MMOL/L (ref 136–145)
WBC NRBC COR # BLD AUTO: 9 10*3/MM3 (ref 3.4–10.8)

## 2024-02-27 PROCEDURE — 94799 UNLISTED PULMONARY SVC/PX: CPT

## 2024-02-27 PROCEDURE — 25010000002 HYDROMORPHONE 1 MG/ML SOLUTION: Performed by: NURSE PRACTITIONER

## 2024-02-27 PROCEDURE — 97161 PT EVAL LOW COMPLEX 20 MIN: CPT

## 2024-02-27 PROCEDURE — G0378 HOSPITAL OBSERVATION PER HR: HCPCS

## 2024-02-27 PROCEDURE — 94640 AIRWAY INHALATION TREATMENT: CPT

## 2024-02-27 PROCEDURE — 80048 BASIC METABOLIC PNL TOTAL CA: CPT | Performed by: NURSE PRACTITIONER

## 2024-02-27 PROCEDURE — 85025 COMPLETE CBC W/AUTO DIFF WBC: CPT | Performed by: NURSE PRACTITIONER

## 2024-02-27 PROCEDURE — 25810000003 SODIUM CHLORIDE 0.9 % SOLUTION: Performed by: NURSE PRACTITIONER

## 2024-02-27 RX ORDER — BISACODYL 10 MG
10 SUPPOSITORY, RECTAL RECTAL DAILY PRN
Status: DISCONTINUED | OUTPATIENT
Start: 2024-02-27 | End: 2024-03-01 | Stop reason: HOSPADM

## 2024-02-27 RX ORDER — OXYCODONE HYDROCHLORIDE 5 MG/1
10 TABLET ORAL EVERY 4 HOURS PRN
Status: DISCONTINUED | OUTPATIENT
Start: 2024-02-27 | End: 2024-03-01 | Stop reason: HOSPADM

## 2024-02-27 RX ORDER — SODIUM CHLORIDE 0.9 % (FLUSH) 0.9 %
10 SYRINGE (ML) INJECTION AS NEEDED
Status: DISCONTINUED | OUTPATIENT
Start: 2024-02-27 | End: 2024-03-01 | Stop reason: HOSPADM

## 2024-02-27 RX ORDER — PANTOPRAZOLE SODIUM 40 MG/1
40 TABLET, DELAYED RELEASE ORAL
Status: DISCONTINUED | OUTPATIENT
Start: 2024-02-27 | End: 2024-03-01 | Stop reason: HOSPADM

## 2024-02-27 RX ORDER — CHOLECALCIFEROL (VITAMIN D3) 125 MCG
5 CAPSULE ORAL NIGHTLY PRN
Status: DISCONTINUED | OUTPATIENT
Start: 2024-02-27 | End: 2024-03-01 | Stop reason: HOSPADM

## 2024-02-27 RX ORDER — NITROGLYCERIN 0.4 MG/1
0.4 TABLET SUBLINGUAL
Status: DISCONTINUED | OUTPATIENT
Start: 2024-02-27 | End: 2024-03-01 | Stop reason: HOSPADM

## 2024-02-27 RX ORDER — HYDROCHLOROTHIAZIDE 25 MG/1
25 TABLET ORAL
Status: DISCONTINUED | OUTPATIENT
Start: 2024-02-27 | End: 2024-03-01 | Stop reason: HOSPADM

## 2024-02-27 RX ORDER — LOSARTAN POTASSIUM 50 MG/1
100 TABLET ORAL
Status: DISCONTINUED | OUTPATIENT
Start: 2024-02-27 | End: 2024-03-01 | Stop reason: HOSPADM

## 2024-02-27 RX ORDER — SODIUM CHLORIDE 0.9 % (FLUSH) 0.9 %
10 SYRINGE (ML) INJECTION EVERY 12 HOURS SCHEDULED
Status: DISCONTINUED | OUTPATIENT
Start: 2024-02-27 | End: 2024-03-01 | Stop reason: HOSPADM

## 2024-02-27 RX ORDER — HYDRALAZINE HYDROCHLORIDE 25 MG/1
25 TABLET, FILM COATED ORAL 3 TIMES DAILY
Status: DISCONTINUED | OUTPATIENT
Start: 2024-02-27 | End: 2024-03-01 | Stop reason: HOSPADM

## 2024-02-27 RX ORDER — LIDOCAINE 4 G/G
1 PATCH TOPICAL
Status: DISCONTINUED | OUTPATIENT
Start: 2024-02-27 | End: 2024-03-01 | Stop reason: HOSPADM

## 2024-02-27 RX ORDER — AMOXICILLIN 250 MG
2 CAPSULE ORAL 2 TIMES DAILY
Status: DISCONTINUED | OUTPATIENT
Start: 2024-02-27 | End: 2024-03-01 | Stop reason: HOSPADM

## 2024-02-27 RX ORDER — BISACODYL 5 MG/1
5 TABLET, DELAYED RELEASE ORAL DAILY PRN
Status: DISCONTINUED | OUTPATIENT
Start: 2024-02-27 | End: 2024-03-01 | Stop reason: HOSPADM

## 2024-02-27 RX ORDER — ATORVASTATIN CALCIUM 40 MG/1
40 TABLET, FILM COATED ORAL DAILY
Status: DISCONTINUED | OUTPATIENT
Start: 2024-02-27 | End: 2024-03-01 | Stop reason: HOSPADM

## 2024-02-27 RX ORDER — ALBUTEROL SULFATE 2.5 MG/3ML
2.5 SOLUTION RESPIRATORY (INHALATION) EVERY 4 HOURS PRN
Status: DISCONTINUED | OUTPATIENT
Start: 2024-02-27 | End: 2024-03-01 | Stop reason: HOSPADM

## 2024-02-27 RX ORDER — SODIUM CHLORIDE 9 MG/ML
40 INJECTION, SOLUTION INTRAVENOUS AS NEEDED
Status: DISCONTINUED | OUTPATIENT
Start: 2024-02-27 | End: 2024-03-01 | Stop reason: HOSPADM

## 2024-02-27 RX ORDER — ACETAMINOPHEN 325 MG/1
650 TABLET ORAL EVERY 4 HOURS PRN
Status: DISCONTINUED | OUTPATIENT
Start: 2024-02-27 | End: 2024-03-01 | Stop reason: HOSPADM

## 2024-02-27 RX ORDER — ONDANSETRON 2 MG/ML
4 INJECTION INTRAMUSCULAR; INTRAVENOUS EVERY 6 HOURS PRN
Status: DISCONTINUED | OUTPATIENT
Start: 2024-02-27 | End: 2024-03-01 | Stop reason: HOSPADM

## 2024-02-27 RX ORDER — SODIUM CHLORIDE 9 MG/ML
75 INJECTION, SOLUTION INTRAVENOUS CONTINUOUS
Status: DISCONTINUED | OUTPATIENT
Start: 2024-02-27 | End: 2024-02-29

## 2024-02-27 RX ORDER — POLYETHYLENE GLYCOL 3350 17 G/17G
17 POWDER, FOR SOLUTION ORAL DAILY PRN
Status: DISCONTINUED | OUTPATIENT
Start: 2024-02-27 | End: 2024-03-01 | Stop reason: HOSPADM

## 2024-02-27 RX ORDER — CLONIDINE HYDROCHLORIDE 0.1 MG/1
0.2 TABLET ORAL 2 TIMES DAILY
Status: DISCONTINUED | OUTPATIENT
Start: 2024-02-27 | End: 2024-03-01 | Stop reason: HOSPADM

## 2024-02-27 RX ADMIN — OXYCODONE 10 MG: 5 TABLET ORAL at 00:51

## 2024-02-27 RX ADMIN — HYDRALAZINE HYDROCHLORIDE 25 MG: 25 TABLET ORAL at 16:08

## 2024-02-27 RX ADMIN — DOCUSATE SODIUM 50MG AND SENNOSIDES 8.6MG 2 TABLET: 8.6; 5 TABLET, FILM COATED ORAL at 09:26

## 2024-02-27 RX ADMIN — HYDRALAZINE HYDROCHLORIDE 25 MG: 25 TABLET ORAL at 20:28

## 2024-02-27 RX ADMIN — Medication 10 ML: at 00:51

## 2024-02-27 RX ADMIN — HYDROMORPHONE HYDROCHLORIDE 1 MG: 1 INJECTION, SOLUTION INTRAMUSCULAR; INTRAVENOUS; SUBCUTANEOUS at 22:21

## 2024-02-27 RX ADMIN — DOCUSATE SODIUM 50MG AND SENNOSIDES 8.6MG 2 TABLET: 8.6; 5 TABLET, FILM COATED ORAL at 20:28

## 2024-02-27 RX ADMIN — OXYCODONE 10 MG: 5 TABLET ORAL at 16:09

## 2024-02-27 RX ADMIN — OXYCODONE 10 MG: 5 TABLET ORAL at 09:13

## 2024-02-27 RX ADMIN — SERTRALINE 150 MG: 100 TABLET, FILM COATED ORAL at 08:44

## 2024-02-27 RX ADMIN — LIDOCAINE 1 PATCH: 4 PATCH TOPICAL at 08:41

## 2024-02-27 RX ADMIN — LOSARTAN POTASSIUM 100 MG: 50 TABLET, FILM COATED ORAL at 08:45

## 2024-02-27 RX ADMIN — CLONIDINE HYDROCHLORIDE 0.2 MG: 0.1 TABLET ORAL at 20:28

## 2024-02-27 RX ADMIN — PANTOPRAZOLE SODIUM 40 MG: 40 TABLET, DELAYED RELEASE ORAL at 08:44

## 2024-02-27 RX ADMIN — BISACODYL 5 MG: 5 TABLET, COATED ORAL at 09:13

## 2024-02-27 RX ADMIN — Medication 10 ML: at 09:13

## 2024-02-27 RX ADMIN — Medication 5 MG: at 00:51

## 2024-02-27 RX ADMIN — PANTOPRAZOLE SODIUM 40 MG: 40 TABLET, DELAYED RELEASE ORAL at 16:09

## 2024-02-27 RX ADMIN — ALBUTEROL SULFATE 2.5 MG: 2.5 SOLUTION RESPIRATORY (INHALATION) at 23:06

## 2024-02-27 RX ADMIN — ATORVASTATIN CALCIUM 40 MG: 40 TABLET, FILM COATED ORAL at 08:45

## 2024-02-27 RX ADMIN — CLONIDINE HYDROCHLORIDE 0.2 MG: 0.1 TABLET ORAL at 00:50

## 2024-02-27 RX ADMIN — OXYCODONE 10 MG: 5 TABLET ORAL at 20:28

## 2024-02-27 RX ADMIN — SODIUM CHLORIDE 75 ML/HR: 9 INJECTION, SOLUTION INTRAVENOUS at 10:05

## 2024-02-27 RX ADMIN — HYDROCHLOROTHIAZIDE 25 MG: 25 TABLET ORAL at 08:45

## 2024-02-27 NOTE — PLAN OF CARE
Goal Outcome Evaluation:  Plan of Care Reviewed With: patient           Outcome Evaluation: Pt is a 68 y.o. female who presented to [facility] on 2/26/24 with lower abdominal pain and acute urinary retention. UA negative for nitrites, blood, bacteria  CT abdomen pelvis showed urinary bladder distention and mild bilateral hydroureteronephrosis, left greater than right   Relevant PMHx: hx of extensive abdominal surgery, recurrent UTI, HTN, HLD, anxiety, depression. Pt is independent with mobility at home; however quality of mobility is questionable due to pt reports of recurrent falls, and only using AD for community distances. Pt presents for PT eval with impaired activity tolerance, gait, balance and safety. She requires Ambrosio for dynamic standing balance. Gait completed with CGA x42' with IV pole support. Pt will benefit from walker or cane use for future gait training. She needs acute care PT to address stated deficits and progress to highest level of safety and independence. HHPT recommended. Pt already has the necessary DME, she just needs additional encouragement to use it more consistently.      Anticipated Discharge Disposition (PT): home with assist, home with home health

## 2024-02-27 NOTE — ED PROVIDER NOTES
Subjective   History of Present Illness  Patient is a 68-year-old female with multiple abdominal surgeries who comes in with abdominal pain and difficulty controlling her bowels and bladder particularly her bladder.  She states that she cannot empty fully and sometimes when she stands up she just urinates on herself.  She thinks she has a urinary tract infection as this is what it has been in the past.    She denies fever or chills.      Review of Systems   Constitutional:  Negative for chills, fatigue and fever.   HENT:  Negative for congestion, tinnitus and trouble swallowing.    Eyes:  Negative for photophobia, discharge and redness.   Respiratory:  Negative for cough and shortness of breath.    Cardiovascular:  Negative for chest pain and palpitations.   Gastrointestinal:  Positive for abdominal pain. Negative for diarrhea, nausea and vomiting.   Genitourinary:  Negative for dysuria, frequency and urgency.   Musculoskeletal:  Negative for back pain, joint swelling and myalgias.   Skin:  Negative for rash.   Neurological:  Negative for dizziness and headaches.   Psychiatric/Behavioral:  Negative for confusion.    All other systems reviewed and are negative.      Past Medical History:   Diagnosis Date    Arthritis     Asthma     Depression     Hypertension     Injury of back     Pneumonia        No Known Allergies    Past Surgical History:   Procedure Laterality Date    APPENDECTOMY      CHOLECYSTECTOMY      HYSTERECTOMY      SPINAL CORD STIMULATOR TRIAL W/ LAMINOTOMY      TONSILLECTOMY         Family History   Problem Relation Age of Onset    Hypertension Mother     Heart failure Mother     Hypertension Father     Heart disease Father     Heart failure Brother        Social History     Socioeconomic History    Marital status:    Tobacco Use    Smoking status: Every Day     Packs/day: 0.50     Years: 50.00     Additional pack years: 0.00     Total pack years: 25.00     Types: Cigarettes     Passive  exposure: Current    Smokeless tobacco: Never   Vaping Use    Vaping Use: Never used   Substance and Sexual Activity    Alcohol use: Yes     Alcohol/week: 2.0 standard drinks of alcohol     Types: 1 Glasses of wine, 1 Cans of beer per week     Comment: OCC    Drug use: Yes     Types: Marijuana     Comment: medical marijuana    Sexual activity: Defer           Objective   Physical Exam  Vitals reviewed.   Constitutional:       Appearance: She is well-developed.   HENT:      Head: Normocephalic and atraumatic.   Eyes:      Conjunctiva/sclera: Conjunctivae normal.      Pupils: Pupils are equal, round, and reactive to light.   Cardiovascular:      Rate and Rhythm: Normal rate and regular rhythm.      Heart sounds: Normal heart sounds.   Pulmonary:      Effort: Pulmonary effort is normal. No respiratory distress.      Breath sounds: Normal breath sounds. No wheezing.   Abdominal:      General: Abdomen is protuberant. Bowel sounds are normal.      Palpations: Abdomen is soft.      Tenderness: There is generalized abdominal tenderness and tenderness in the suprapubic area. There is no right CVA tenderness, left CVA tenderness, guarding or rebound.      Hernia: A hernia is present. Hernia is present in the ventral area.      Comments: Patient has vertical and horizontal scarring across to her abdomen there is tenderness with what appears to be bladder distention noted   Musculoskeletal:         General: No deformity. Normal range of motion.      Cervical back: Normal range of motion and neck supple.   Skin:     General: Skin is warm and dry.      Capillary Refill: Capillary refill takes less than 2 seconds.   Neurological:      Mental Status: She is alert and oriented to person, place, and time.      GCS: GCS eye subscore is 4. GCS verbal subscore is 5. GCS motor subscore is 6.      Motor: No weakness.   Psychiatric:         Mood and Affect: Mood normal.         Behavior: Behavior normal.         Procedures           ED  "Course  ED Course as of 02/26/24 2324 Mon Feb 26, 2024 2116 Waiting the patient's bladder drained spoke to the nurse about this again and the patient is requesting pain medication which I will give her Norco at this time [KW]      ED Course User Index  [KW] Laura Rivera, APRN      /70   Pulse (!) 48   Temp 98.2 °F (36.8 °C)   Resp 20   Ht 167.6 cm (66\")   Wt 77.1 kg (170 lb)   SpO2 95%   BMI 27.44 kg/m²   Labs Reviewed   COMPREHENSIVE METABOLIC PANEL - Abnormal; Notable for the following components:       Result Value    Creatinine 1.08 (*)     eGFR 56.4 (*)     All other components within normal limits    Narrative:     GFR Normal >60  Chronic Kidney Disease <60  Kidney Failure <15     URINALYSIS W/ CULTURE IF INDICATED - Abnormal; Notable for the following components:    Appearance, UA Cloudy (*)     Protein, UA 30 mg/dL (1+) (*)     All other components within normal limits    Narrative:     In absence of clinical symptoms, the presence of pyuria, bacteria, and/or nitrites on the urinalysis result does not correlate with infection.   LIPASE - Normal   CBC WITH AUTO DIFFERENTIAL - Normal   URINALYSIS, MICROSCOPIC ONLY   CBC AND DIFFERENTIAL    Narrative:     The following orders were created for panel order CBC & Differential.  Procedure                               Abnormality         Status                     ---------                               -----------         ------                     CBC Auto Differential[[id number]]        Normal              Final result                 Please view results for these tests on the individual orders.     Medications   sodium chloride 0.9 % flush 10 mL (has no administration in time range)   iopamidol (ISOVUE-370) 76 % injection 100 mL (100 mL Intravenous Given 2/26/24 1759)   HYDROcodone-acetaminophen (NORCO) 5-325 MG per tablet 1 tablet (1 tablet Oral Given 2/26/24 2159)     CT Abdomen Pelvis With Contrast    Result Date: " 2/26/2024  Impression: 1.Urinary bladder distention. Correlate for history of bladder outlet obstruction or neurogenic bladder. Resultant mild bilateral hydroureteronephrosis, left greater than right. There is a potentially associated 5.9 cm left posterior bladder diverticulum. 2.Other incidental nonemergent findings as detailed above. Electronically Signed: Santiago Montenegro MD  2/26/2024 6:10 PM EST  Workstation ID: [id number]                                          Medical Decision Making  Patient is a 68-year-old female who comes in with abdominal pain she states that she is having difficulty controlling her bladder and feels like is not emptying well she states this happened months ago and she was discharged home and instructed worse over the last several months.  She states last time she had a urinary tract infection and they thought that is what was making her have difficulty controlling her urine.  Today she comes in has a CT of the abdomen pelvis with IV contrast which was interpreted by myself as well as the radiologist as having no acute findings except for some urinary bladder distention to correlate with history of neurogenic bladder or obstruction disorder the patient has neither 1 of these I had the nursing staff place a Owen catheter and they removed almost 2 L total the urine was examined in the lab and found to not be infected.  Patient will be placed in ED observation overnight for urology consultation as I am unsure why the patient is having urinary retention.  Patient had labs which were essentially normal as interpreted by  She was agreeable to placement in ED observation.      Problems Addressed:  Acute urinary retention: complicated acute illness or injury  Lower abdominal pain: complicated acute illness or injury    Amount and/or Complexity of Data Reviewed  External Data Reviewed: labs and notes.  Labs: ordered. Decision-making details documented in ED Course.  Radiology: ordered and  independent interpretation performed. Decision-making details documented in ED Course.  ECG/medicine tests: ordered and independent interpretation performed. Decision-making details documented in ED Course.    Risk  OTC drugs.  Prescription drug management.  Decision regarding hospitalization.        Final diagnoses:   Lower abdominal pain   Acute urinary retention       ED Disposition  ED Disposition       ED Disposition   Decision to Admit    Condition   --    Comment   --               No follow-up provider specified.       Medication List      No changes were made to your prescriptions during this visit.            Laura Rivera, APRN  02/26/24 6535

## 2024-02-27 NOTE — CASE MANAGEMENT/SOCIAL WORK
Discharge Planning Assessment   Moisés     Patient Name: Mona Sheffield  MRN: [id number]  Today's Date: 2/27/2024    Admit Date: 2/26/2024    Plan: Return home with brother   Discharge Needs Assessment       Row Name 02/27/24 1338       Living Environment    People in Home sibling(s)    Name(s) of People in Home Adrian valdivia    Current Living Arrangements home    Potentially Unsafe Housing Conditions none    In the past 12 months has the electric, gas, oil, or water company threatened to shut off services in your home? No    Primary Care Provided by self    Provides Primary Care For no one, unable/limited ability to care for self    Family Caregiver if Needed sibling(s)    Family Caregiver Names Adrian valdivia    Quality of Family Relationships supportive;helpful    Able to Return to Prior Arrangements yes       Resource/Environmental Concerns    Resource/Environmental Concerns none    Transportation Concerns none       Transportation Needs    In the past 12 months, has lack of transportation kept you from medical appointments or from getting medications? no    In the past 12 months, has lack of transportation kept you from meetings, work, or from getting things needed for daily living? No       Food Insecurity    Within the past 12 months, you worried that your food would run out before you got the money to buy more. Never true    Within the past 12 months, the food you bought just didn't last and you didn't have money to get more. Never true       Transition Planning    Patient/Family Anticipates Transition to home with family    Patient/Family Anticipated Services at Transition none       Discharge Needs Assessment    Equipment Currently Used at Home cane, straight    Anticipated Changes Related to Illness none    Equipment Needed After Discharge none                   Discharge Plan       Row Name 02/27/24 9065       Plan    Plan Return home with brother    Patient/Family in Agreement with Plan yes     Plan Comments Barriers: Pending Urology consult. CM met with Ms. Sheffield who lives with her brother, drives and is independent. She has a cane she uses occasionally. CM verified PCP and Pharmacy and she denied difficulty purchasing medications or utilities. CM will follow as needed                       Demographic Summary       Row Name 02/27/24 1393       General Information    Admission Type observation    Arrived From emergency department    Required Notices Provided Observation Status Notice    Referral Source admission list    Reason for Consult discharge planning    Preferred Language English       Contact Information    Permission Granted to Share Info With                    Functional Status       Row Name 02/27/24 4000       Functional Status    Usual Activity Tolerance good    Current Activity Tolerance moderate       Functional Status, IADL    Medications independent    Meal Preparation independent    Housekeeping independent    Laundry independent    Shopping independent       Mental Status    General Appearance WDL WDL       Mental Status Summary    Recent Changes in Mental Status/Cognitive Functioning no changes                    Maintained distance greater than six feet and spent less than 15 minutes in the room.     Brooke MENDOZA,RN Case Manager  [facility]  Phone: Desk- [phone] cell- [phone]

## 2024-02-27 NOTE — THERAPY EVALUATION
Patient Name: Mona Sheffield  : 1955    MRN: [id number]                              Today's Date: 2024       Admit Date: 2024    Visit Dx:     ICD-10-CM ICD-9-CM   1. Lower abdominal pain  R10.30 789.09   2. Acute urinary retention  R33.8 788.29     Patient Active Problem List   Diagnosis    DDD (degenerative disc disease), lumbar    Screening mammogram for breast cancer    Preventative health care    Vertigo    Vitamin D deficiency    Dermoid cyst of skin of back    Primary hypertension    Anxiety and depression    Gastroesophageal reflux disease    Chronic obstructive pulmonary disease    Tension headache    Fever    Cough    Lower respiratory infection    Migraine without status migrainosus, not intractable    Chest pain    Encounter for observation for other suspected diseases and conditions ruled out     Pain of upper abdomen    Cognitive impairment    Contact dermatitis    Snoring    Daytime somnolence    Hypersomnia, unspecified    Arthralgia    Insomnia    Mixed hyperlipidemia    Prediabetes    MURTAZA (obstructive sleep apnea)    UTI (urinary tract infection)    Recurrent UTI    Urinary urgency    Urinary incontinence    Need for influenza vaccination    Urinary retention     Past Medical History:   Diagnosis Date    Arthritis     Asthma     Depression     Hypertension     Injury of back     Pneumonia      Past Surgical History:   Procedure Laterality Date    APPENDECTOMY      CHOLECYSTECTOMY      HYSTERECTOMY      SPINAL CORD STIMULATOR TRIAL W/ LAMINOTOMY      TONSILLECTOMY        General Information       Row Name 24 1502          Physical Therapy Time and Intention    Document Type evaluation  -     Mode of Treatment physical therapy  -       Row Name 24 1502          General Information    Patient Profile Reviewed yes  -AH     Prior Level of Function independent:;all household mobility;community mobility;gait;transfer;ADL's;cleaning;yard work  pt's brother drives her  places. Pt uses cane or rollator when out in the community. Pt admits to furniture cruising and shuffles steps in the home. She reports recurrent falls, mostly outside. Pt is independent with floor/ground recovery after falling.  -     Existing Precautions/Restrictions fall  -[facility] Name 02/27/24 1502          Living Environment    People in Home sibling(s)  -AH       Row Name 02/27/24 1502          Home Main Entrance    Number of Stairs, Main Entrance four  -     Stair Railings, Main Entrance railings safe and in good condition;railing on right side (ascending)  -AH       Row Name 02/27/24 1502          Stairs Within Home, Primary    Number of Stairs, Within Home, Primary four  -     Stair Railings, Within Home, Primary railings safe and in good condition;railing on right side (ascending)  -     Stairs Comment, Within Home, Primary 4 steps up to her bedroom.  -AH       Row Name 02/27/24 1502          Cognition    Orientation Status (Cognition) oriented x 4  -AH       Row Name 02/27/24 1502          Safety Issues, Functional Mobility    Safety Issues Affecting Function (Mobility) awareness of need for assistance;insight into deficits/self-awareness  -     Comment, Safety Issues/Impairments (Mobility) pt is dismissive of balance deficit - appears to be chronic. pt reports she's learned for fall without getting hurt.  -               User Key  (r) = Recorded By, (t) = Taken By, (c) = Cosigned By      Initials Name Provider Type     Sylvia El, PT Physical Therapist                   Mobility       [facility] Name 02/27/24 1505          Bed Mobility    Bed Mobility bed mobility (all) activities  -     All Activities, [place] (Bed Mobility) modified independence  -     Assistive Device (Bed Mobility) head of bed elevated  -[facility] Name 02/27/24 1505          Sit-Stand Transfer    Sit-Stand [place] (Transfers) minimum assist (75% patient effort)  -     Comment, (Sit-Stand Transfer)  pt with R LOB upon standing with stepping techinuqe to attempt to recover. Therapist provides Ambrosio to steady.  -[facility] Name 02/27/24 1505          Gait/Stairs (Locomotion)    [place] Level (Gait) contact guard  -     Assistive Device (Gait) other (see comments)  IV pole support.  -     Patient was able to Ambulate yes  -     Distance in Feet (Gait) 42'  -     Deviations/Abnormal Patterns (Gait) skylar decreased;stride length decreased  -     Right Sided Gait Deviations --  RLE weakness in stance phase  -     Comment, (Gait/Stairs) gait distance limited by BLE fatigue.  -               User Key  (r) = Recorded By, (t) = Taken By, (c) = Cosigned By      Initials Name Provider Type     Sylvia El, JOE Physical Therapist                   Obj/Interventions       [facility] Name 02/27/24 1509          Range of Motion Comprehensive    Comment, General Range of Motion BLE AROM WFL  -[facility] Name 02/27/24 1509          Strength Comprehensive (MMT)    Comment, General Manual Muscle Testing (MMT) Assessment LLE grossly 4+/5. RLE grossly 4/5.  -[facility] Name 02/27/24 1509          Motor Skills    Motor Skills functional endurance  -     Functional Endurance fair  -[facility] Name 02/27/24 1509          Balance    Balance Assessment sitting static balance;sitting dynamic balance;standing static balance;standing dynamic balance  -     Static Sitting Balance independent  -     Dynamic Sitting Balance independent  -     Position, Sitting Balance supported  -     Static Standing Balance contact guard  -     Dynamic Standing Balance minimal assist  -     Position/Device Used, Standing Balance supported  -[facility] Name 02/27/24 1509          Sensory Assessment (Somatosensory)    Sensory Assessment (Somatosensory) sensation intact  -               User Key  (r) = Recorded By, (t) = Taken By, (c) = Cosigned By      Initials Name Provider Type    Sylvia Fong, JOE Physical  Therapist                   Goals/Plan       [facility] Name 02/27/24 1518          Transfer Goal 1 (PT)    Activity/Assistive Device (Transfer Goal 1, PT) transfers, all  -     [place] Level/Cues Needed (Transfer Goal 1, PT) modified independence  -     Time Frame (Transfer Goal 1, PT) long term goal (LTG);2 weeks  -[facility] Name 02/27/24 151          Gait Training Goal 1 (PT)    Activity/Assistive Device (Gait Training Goal 1, PT) gait (walking locomotion)  with least restrictive assistive device.  -     [place] Level (Gait Training Goal 1, PT) modified independence  -     Time Frame (Gait Training Goal 1, PT) long term goal (LTG);2 weeks  -[facility] Name 02/27/24 1513          Therapy Assessment/Plan (PT)    Planned Therapy Interventions (PT) balance training;gait training;home exercise program;neuromuscular re-education;patient/family education;stair training;strengthening;transfer training  -               User Key  (r) = Recorded By, (t) = Taken By, (c) = Cosigned By      Initials Name Provider Type     Sylvia El, PT Physical Therapist                   Clinical Impression       [facility] Name 02/27/24 1514          Pain    Pre/Posttreatment Pain Comment pt reports chronic abdominal pain shooting down midline to her vagina. pt does not rate.  -     Pain Intervention(s) Repositioned  -[facility] Name 02/27/24 151          Plan of Care Review    Plan of Care Reviewed With patient  -     Outcome Evaluation Pt is a 68 y.o. female who presented to [facility] on [facility]/26/24 with lower abdominal pain and acute urinary retention. UA negative for nitrites, blood, bacteria  CT abdomen pelvis showed urinary bladder distention and mild bilateral hydroureteronephrosis, left greater than right   Relevant PMHx: hx of extensive abdominal surgery, recurrent UTI, HTN, HLD, anxiety, depression. Pt is independent with mobility at home; however quality of mobility is questionable due to pt reports of recurrent  falls, and only using AD for community distances. Pt presents for PT eval with impaired activity tolerance, gait, balance and safety. She requires Ambrosio for dynamic standing balance. Gait completed with CGA x42' with IV pole support. Pt will benefit from walker or cane use for future gait training. She needs acute care PT to address stated deficits and progress to highest level of safety and independence. HHPT recommended. Pt already has the necessary DME, she just needs additional encouragement to use it more consistently.  -       Row Name 02/27/24 1510          Therapy Assessment/Plan (PT)    Patient/Family Therapy Goals Statement (PT) get better  -     Criteria for Skilled Interventions Met (PT) yes;meets criteria  -     Therapy Frequency (PT) 3 times/wk  -     Predicted Duration of Therapy Intervention (PT) until DC  -       Row Name 02/27/24 1510          Vital Signs    Pre Systolic BP Rehab 120  -AH     Pre Treatment Diastolic BP 69  -AH     Pretreatment Heart Rate (beats/min) 47  -AH     Intra SpO2 (%) 95  -AH     O2 Delivery Intra Treatment room air  -       Row Name 02/27/24 1510          Positioning and Restraints    Post Treatment Position bed  -     In Bed notified nsg;supine;exit alarm on;encouraged to call for assist;call light within reach  RN notified of pt's balance impairment  -               User Key  (r) = Recorded By, (t) = Taken By, (c) = Cosigned By      Initials Name Provider Type    Sylvia Fong, PT Physical Therapist                   Outcome Measures       Row Name 02/27/24 1522 02/27/24 0800       How much help from another person do you currently need...    Turning from your back to your side while in flat bed without using bedrails? 4  -AH 4  -CW    Moving from lying on back to sitting on the side of a flat bed without bedrails? 4  -AH 4  -CW    Moving to and from a bed to a chair (including a wheelchair)? 3  -AH 4  -CW    Standing up from a chair using your arms  (e.g., wheelchair, bedside chair)? 3  - 4  -CW    Climbing 3-5 steps with a railing? 3  - 3  -CW    To walk in hospital room? 3  - 4  -CW    AM-PAC 6 Clicks Score (PT) 20  - 23  -CW    Highest Level of Mobility Goal 6 --> Walk 10 steps or more  - 7 --> Walk 25 feet or more  -      Row Name 02/27/24 1522          Functional Assessment    Outcome Measure Options AM-PAC 6 Clicks Basic Mobility (PT)  -               User Key  (r) = Recorded By, (t) = Taken By, (c) = Cosigned By      Initials Name Provider Type    CW Char Cedeno, RN Registered Nurse     Sylvia El, PT Physical Therapist                                 Physical Therapy Education       Title: PT OT SLP Therapies (Done)       Topic: Physical Therapy (Done)       Point: Mobility training (Done)       Learning Progress Summary             Patient Eager, E, NR,VU by  at 2/27/2024 1523                         Point: Home exercise program (Done)       Learning Progress Summary             Patient Eager, E, NR,VU by  at 2/27/2024 1523                         Point: Body mechanics (Done)       Learning Progress Summary             Patient Eager, E, NR,VU by  at 2/27/2024 1523                         Point: Precautions (Done)       Learning Progress Summary             Patient Eager, E, NR,VU by  at 2/27/2024 1523                                         User Key       Initials Effective Dates Name Provider Type [facility] 12/04/23 -  Sylvia El, PT Physical Therapist PT                  PT Recommendation and Plan  Planned Therapy Interventions (PT): balance training, gait training, home exercise program, neuromuscular re-education, patient/family education, stair training, strengthening, transfer training  Plan of Care Reviewed With: patient  Outcome Evaluation: Pt is a 68 y.o. female who presented to [facility] on [facility]/26/24 with lower abdominal pain and acute urinary retention. UA negative for nitrites, blood, bacteria  CT abdomen  pelvis showed urinary bladder distention and mild bilateral hydroureteronephrosis, left greater than right   Relevant PMHx: hx of extensive abdominal surgery, recurrent UTI, HTN, HLD, anxiety, depression. Pt is independent with mobility at home; however quality of mobility is questionable due to pt reports of recurrent falls, and only using AD for community distances. Pt presents for PT eval with impaired activity tolerance, gait, balance and safety. She requires Ambrosio for dynamic standing balance. Gait completed with CGA x42' with IV pole support. Pt will benefit from walker or cane use for future gait training. She needs acute care PT to address stated deficits and progress to highest level of safety and independence. HHPT recommended. Pt already has the necessary DME, she just needs additional encouragement to use it more consistently.     Time Calculation:         PT Charges       Row Name 02/27/24 1524             Time Calculation    Start Time 1423  -      Stop Time 1446  -      Time Calculation (min) 23 min  -      PT Non-Billable Time (min) 0 min  -      PT Received On 02/27/24  -      PT - Next Appointment 02/29/24  -      PT Goal Re-Cert Due Date 03/12/24  -         Time Calculation- PT    Total Timed Code Minutes- PT 0 minute(s)  -                User Key  (r) = Recorded By, (t) = Taken By, (c) = Cosigned By      Initials Name Provider Type     Sylvia El PT Physical Therapist                  Therapy Charges for Today       Code Description Service Date Service Provider Modifiers Qty    [id number]  PT EVAL LOW COMPLEXITY 4 2/27/2024 Sylvia El, PT GP 1            PT G-Codes  Outcome Measure Options: AM-PAC 6 Clicks Basic Mobility (PT)  AM-PAC 6 Clicks Score (PT): 20  PT Discharge Summary  Anticipated Discharge Disposition (PT): home with assist, home with home health    Sylvia El PT  2/27/2024

## 2024-02-27 NOTE — CONSULTS
Visited with patient at bedside.    Patient reports wanting to make decision making in regards to estate. Encouraged patient to seek an  in regards to the estate. Patient also during this hospitalization want to complete advanced directives. Patient during the time of visit did not want to complete forms. Patient reports she is [place] and would like a blessing from a . Contacted Father Julius for the request.    Patient to contact  when wanting to complete AD forms.     Daren Nelson

## 2024-02-27 NOTE — ACP (ADVANCE CARE PLANNING)
ACP discussion with patient. Patient reports wanting to be a DNR/DNI, but wants to wait on completing forms or making wishes known to treatment team. Patient reports she will contact me later during hospitalization when she wants to complete advanced directives.    Chaplain Daren Nelson

## 2024-02-27 NOTE — H&P
[facility]    Patient Name: Mona Sheffield  : 1955  MRN: [id number]  Primary Care Physician: Teresa Fernandez APRN  Date of admission: 2024     Patient Care Team:  Teresa Fernandez APRN as PCP - General (Nurse Practitioner)  Thomas Roa MD as Consulting Physician (Cardiology)  Barby Hopper MD as Consulting Physician (Cardiology)          Subjective   History Present Illness     Chief Complaint:   Chief Complaint   Patient presents with    Abdominal Pain     Pt having abd pain, swelling, incontinent of urine. Some hard to have a BM, does not have the feeling to push.  Pt was here a month ago for same.      Abdominal Pain     Abdominal Pain  Associated symptoms include constipation. Pertinent negatives include no fever or nausea.     ED 2024  Patient is a 68-year-old female with multiple abdominal surgeries who comes in with abdominal pain and difficulty controlling her bowels and bladder particularly her bladder. She states that she cannot empty fully and sometimes when she stands up she just urinates on herself. She thinks she has a urinary tract infection as this is what it has been in the past.     Observation 2024  Patient agrees with HPI noted above including suprapubic and lower quadrant pain off-and-on for the past month.  She currently rates pain as 8/10.  States that she was admitted a few weeks ago with similar symptoms and diagnosed with cystitis.  She was sent home on antibiotics and noted that she was having urinary incontinence.  She proceeded to the ED yesterday due to difficulty controlling bladder and bowel, especially her bladder.  She thinks that it could be related to significant basic ovarian cyst that she had removed 15 years ago.  Thinks that burning pain could be related to scar tissue.    Review of Systems   Constitutional: Negative for fever.   Gastrointestinal:  Positive for bloating, abdominal pain, bowel incontinence and  constipation. Negative for nausea.   Genitourinary:  Positive for bladder incontinence, flank pain, incomplete emptying and pelvic pain.   All other systems reviewed and are negative.          Personal History     Past Medical History:   Past Medical History:   Diagnosis Date    Arthritis     Asthma     Depression     Hypertension     Injury of back     Pneumonia        Surgical History:      Past Surgical History:   Procedure Laterality Date    APPENDECTOMY      CHOLECYSTECTOMY      HYSTERECTOMY      SPINAL CORD STIMULATOR TRIAL W/ LAMINOTOMY      TONSILLECTOMY             Family History: family history includes Heart disease in her father; Heart failure in her brother and mother; Hypertension in her father and mother. Otherwise pertinent FHx was reviewed and unremarkable.     Social History:  reports that she has been smoking cigarettes. She has a 25.00 pack-year smoking history. She has been exposed to tobacco smoke. She has never used smokeless tobacco. She reports current alcohol use of about 2.0 standard drinks of alcohol per week. She reports current drug use. Drug: Marijuana.      Medications:  Prior to Admission medications    Medication Sig Start Date End Date Taking? Authorizing Provider   albuterol (PROVENTIL) (2.5 MG/3ML) 0.083% nebulizer solution Take 2.5 mg by nebulization Every 4 (Four) Hours As Needed for Wheezing. 7/11/23  Yes Teresa Fernandez APRN   albuterol sulfate  (90 Base) MCG/ACT inhaler albuterol sulfate HFA 90 mcg/actuation aerosol inhaler   INHALE 2 PUFFS BY MOUTH EVERY 6 HOURS AS NEEDED   Yes Emergency, Nurse Hiren, RN   atenolol (TENORMIN) 100 MG tablet TAKE 1 TABLET BY MOUTH EVERY DAY 1/4/24  Yes Teresa Fernandez APRN   atorvastatin (LIPITOR) 40 MG tablet  12/12/23  Yes Provider, MD Olman   Budeson-Glycopyrrol-Formoterol (Breztri Aerosphere) 160-9-4.8 MCG/ACT aerosol inhaler INHALE 2 PUFFS BY MOUTH TWICE DAILY 9/21/23  Yes Teresa Fernandez APRN   cloNIDine  (CATAPRES) 0.2 MG tablet TAKE 1 TABLET BY MOUTH TWICE DAILY 12/12/23  Yes Teresa Fernandez APRN   dexlansoprazole (DEXILANT) 60 MG capsule Take 1 capsule by mouth Daily.   Yes Provider, MD Olman   hydrALAZINE (APRESOLINE) 25 MG tablet TAKE 1 TABLET BY MOUTH THREE TIMES DAILY 11/14/23  Yes Teresa Fernandez APRN   lidocaine (LIDODERM) 5 % Lidoderm 5 % topical patch   APPLY 1 PATCH BY TRANSDERMAL ROUTE ONCE DAILY (MAY WEAR UP TO 12HOURS.)   Yes Emergency, Nurse Epic, RN   Lidocaine, Anorectal, (LMX 5) 5 % cream cream Apply nightly as needed. 8/30/22  Yes Teresa Fernandez APRN   losartan-hydrochlorothiazide (HYZAAR) 100-25 MG per tablet TAKE 1 TABLET BY MOUTH DAILY 12/12/23  Yes Teresa Fernandez APRN   oxyCODONE-acetaminophen (PERCOCET)  MG per tablet Take 1 tablet by mouth 2 (Two) Times a Day As Needed for Moderate Pain. 2/2/24  Yes Teresa Fernandez APRN   sertraline (ZOLOFT) 100 MG tablet TAKE 1 AND 1/2 TABLETS BY MOUTH DAILY 12/12/23  Yes Teresa Fernanedz APRN       Allergies:  No Known Allergies    Objective   Objective     Vital Signs  Temp:  [98 °F (36.7 °C)-98.3 °F (36.8 °C)] 98.1 °F (36.7 °C)  Heart Rate:  [47-64] 47  Resp:  [18-20] 18  BP: (124-187)/(68-85) 143/85  SpO2:  [91 %-95 %] 95 %  on   ;   Device (Oxygen Therapy): room air  Body mass index is 28.62 kg/m².    Physical Exam  Vitals and nursing note reviewed.   Constitutional:       Appearance: Normal appearance.   HENT:      Head: Normocephalic and atraumatic.      Right Ear: External ear normal.      Left Ear: External ear normal.      Nose: Nose normal.      Mouth/Throat:      Mouth: Mucous membranes are moist.      Pharynx: Oropharynx is clear.   Eyes:      Extraocular Movements: Extraocular movements intact.   Cardiovascular:      Rate and Rhythm: Normal rate and regular rhythm.      Pulses: Normal pulses.      Heart sounds: Normal heart sounds.   Pulmonary:      Effort: Pulmonary effort is normal.      Breath  sounds: Normal breath sounds.   Abdominal:      General: Abdomen is flat. Bowel sounds are normal.      Palpations: Abdomen is soft.      Tenderness: There is abdominal tenderness.      Comments: Scar noted on abdomen, midline.    Musculoskeletal:         General: Normal range of motion.      Cervical back: Normal range of motion.   Skin:     General: Skin is warm.   Neurological:      General: No focal deficit present.      Mental Status: She is alert and oriented to person, place, and time.   Psychiatric:         Mood and Affect: Mood normal.         Behavior: Behavior normal.           Results Review:  I have personally reviewed most recent lab results and radiology images and interpretations and agree with findings, most notably: CMP, lipase, CBC, UA and CT abdomen pelvis.    Results from last 7 days   Lab Units 02/27/24  0441   WBC 10*3/mm3 9.00   HEMOGLOBIN g/dL 12.6   HEMATOCRIT % 37.3   PLATELETS 10*3/mm3 140     Results from last 7 days   Lab Units 02/27/24  0441 02/26/24  1659   SODIUM mmol/L 142 141   POTASSIUM mmol/L 3.5 3.8   CHLORIDE mmol/L 103 102   CO2 mmol/L 28.0 27.0   BUN mg/dL 29* 22   CREATININE mg/dL 1.08* 1.08*   GLUCOSE mg/dL 98 96   CALCIUM mg/dL 9.4 9.9   ALK PHOS U/L  --  63   ALT (SGPT) U/L  --  20   AST (SGOT) U/L  --  24     Estimated Creatinine Clearance: 54 mL/min (A) (by C-G formula based on SCr of 1.08 mg/dL (H)).  Brief Urine Lab Results  (Last result in the past 365 days)        Color   Clarity   Blood   Leuk Est   Nitrite   Protein   CREAT   Urine HCG        02/26/24 1704 Yellow   Cloudy   Negative   Negative   Negative   30 mg/dL (1+)                   Microbiology Results (last 10 days)       ** No results found for the last 240 hours. **            ECG/EMG Results (most recent)       None                Results for orders placed during the hospital encounter of 11/02/23    Adult Transthoracic Echo Complete W/ Cont if Necessary Per Protocol    Interpretation Summary    Left  ventricular systolic function is normal. Calculated left ventricular EF = 63% Left ventricular ejection fraction appears to be 61 - 65%.    Left ventricular diastolic function is consistent with (grade II w/high LAP) pseudonormalization.    The left atrial cavity is dilated.    Estimated right ventricular systolic pressure from tricuspid regurgitation is normal (<35 mmHg).    No significant valvular abnormalities noted.      CT Abdomen Pelvis With Contrast    Result Date: 2/26/2024  Impression: 1.Urinary bladder distention. Correlate for history of bladder outlet obstruction or neurogenic bladder. Resultant mild bilateral hydroureteronephrosis, left greater than right. There is a potentially associated 5.9 cm left posterior bladder diverticulum. 2.Other incidental nonemergent findings as detailed above. Electronically Signed: Santiago Montenegro MD  2/26/2024 6:10 PM EST  Workstation ID: [id number]       Estimated Creatinine Clearance: 54 mL/min (A) (by C-G formula based on SCr of 1.08 mg/dL (H)).    Assessment & Plan   Assessment/Plan       Active Hospital Problems    Diagnosis  POA    **Urinary retention [R33.9]  Yes      Resolved Hospital Problems   No resolved problems to display.       Urinary retention  -CMP showed BUN 29 and creatinine 1.08 but otherwise unremarkable  -CBC and lipase unremarkable  -UA negative for nitrites, blood, bacteria  -CT abdomen pelvis showed urinary bladder distention and mild bilateral hydroureteronephrosis, left greater than right  -In the ED patient given Norco and iopamidol  -Owen catheter inserted in the ED and per ED's notes patient had 2000 urinary output shortly after insertion  -Urology consulted  -Oxycodone ordered as needed  -Gentle hydration    Hypertension  -Moderately controlled   BP Readings from Last 1 Encounters:   02/27/24 143/85   - Continue clonidine, and hydralazine, and Hyzaar  -Atenolol on hold due to bradycardia  - Monitor while admitted     Hyperlipidemia  -Continue  Lipitor    Depression/anxiety  -Continue Zoloft    Overweight  -BMI 28.62  -Lifestyle modifications    VTE Prophylaxis -   Mechanical Order History:        Ordered        02/27/24 0025  Place Sequential Compression Device  Once            02/27/24 0025  Maintain Sequential Compression Device  Continuous                          Pharmalogical Order History:       None            CODE STATUS:    Code Status and Medical Interventions:   Ordered at: 02/27/24 0025     Level Of Support Discussed With:    Patient     Code Status (Patient has no pulse and is not breathing):    CPR (Attempt to Resuscitate)     Medical Interventions (Patient has pulse or is breathing):    Full Support       This patient has been examined wearing personal protective equipment.     I discussed the patient's findings and my recommendations with patient and nursing staff.      Signature:Electronically signed by ZULEYMA Brown, 02/27/24, 9:49 AM EST.

## 2024-02-27 NOTE — PROGRESS NOTES
Patient CT scan of her abdomen and pelvis is showing bladder distention.  Has she followed up with urology as recommended?

## 2024-02-27 NOTE — ED NOTES
Nursing report ED to floor  Mona Sheffield  68 y.o.  female    HPI:   Chief Complaint   Patient presents with    Abdominal Pain     Pt having abd pain, swelling, incontinent of urine. Some hard to have a BM, does not have the feeling to push.  Pt was here a month ago for same.        Admitting doctor:   Jackson Gaines MD    Admitting diagnosis:   The primary encounter diagnosis was Lower abdominal pain. A diagnosis of Acute urinary retention was also pertinent to this visit.    Code status:   Current Code Status       Date Active Code Status Order ID Comments User Context       Prior            Allergies:   Patient has no known allergies.    Isolation:  No active isolations     Fall Risk:  Fall Risk Assessment was completed, and patient is at moderate risk for falls.   Predictive Model Details         24 (Low) Factor Value    Calculated 2/26/2024 23:05 Age 68    Risk of Fall Model Musculoskeletal Assessment WDL     Active Peripheral IV Present     Respiratory Rate 20     Skin Assessment WDL     Financial Class Other     Magnesium not on file     Diastolic BP 70     Tobacco Use Current     Benito Scale not on file     Number of Distinct Medication Classes administered 2     Peripheral Vascular Assessment WDL     Creatinine 1.08 mg/dL     Gastrointestinal Assessment WDL     Number of administrations of Analgesic Narcotics 1     Chloride 102 mmol/L     Cardiac Assessment WDL     Total Bilirubin 0.5 mg/dL     ALT 20 U/L     Days after Admission 0.166     Albumin 4.3 g/dL     Calcium 9.9 mg/dL     Potassium 3.8 mmol/L         Weight:       02/26/24  1649   Weight: 77.1 kg (170 lb)       Intake and Output    Intake/Output Summary (Last 24 hours) at 2/26/2024 2305  Last data filed at 2/26/2024 2135  Gross per 24 hour   Intake --   Output 2450 ml   Net -2450 ml       Diet:        Most recent vitals:   Vitals:    02/26/24 1649 02/26/24 2131 02/26/24 2202 02/26/24 2259   BP: 169/84 (!) 187/85 166/68 171/70   Pulse: 64 (!)  "49 (!) 47 (!) 48   Resp: 20      Temp: 98.2 °F (36.8 °C)      SpO2: 95%      Weight: 77.1 kg (170 lb)      Height: 167.6 cm (66\")          Active LDAs/IV Access:   Lines, Drains & Airways       Active LDAs       Name Placement date Placement time Site Days    Peripheral IV 02/26/24 1659 Left Antecubital 02/26/24 1659  Antecubital  less than 1                    Skin Condition:   Skin Assessments (last day)       None             Labs (abnormal labs have a star):   Labs Reviewed   COMPREHENSIVE METABOLIC PANEL - Abnormal; Notable for the following components:       Result Value    Creatinine 1.08 (*)     eGFR 56.4 (*)     All other components within normal limits    Narrative:     GFR Normal >60  Chronic Kidney Disease <60  Kidney Failure <15     URINALYSIS W/ CULTURE IF INDICATED - Abnormal; Notable for the following components:    Appearance, UA Cloudy (*)     Protein, UA 30 mg/dL (1+) (*)     All other components within normal limits    Narrative:     In absence of clinical symptoms, the presence of pyuria, bacteria, and/or nitrites on the urinalysis result does not correlate with infection.   LIPASE - Normal   CBC WITH AUTO DIFFERENTIAL - Normal   URINALYSIS, MICROSCOPIC ONLY   CBC AND DIFFERENTIAL    Narrative:     The following orders were created for panel order CBC & Differential.  Procedure                               Abnormality         Status                     ---------                               -----------         ------                     CBC Auto Differential[[id number]]        Normal              Final result                 Please view results for these tests on the individual orders.       LOC: Person, Place, Time, and Situation    Telemetry:  Observation Unit    Cardiac Monitoring Ordered: no    EKG:   No orders to display       Medications Given in the ED:   Medications   sodium chloride 0.9 % flush 10 mL (has no administration in time range)   iopamidol (ISOVUE-370) 76 % injection 100 mL " (100 mL Intravenous Given 2/26/24 1759)   HYDROcodone-acetaminophen (NORCO) 5-325 MG per tablet 1 tablet (1 tablet Oral Given 2/26/24 2159)       Imaging results:  CT Abdomen Pelvis With Contrast    Result Date: 2/26/2024  Impression: 1.Urinary bladder distention. Correlate for history of bladder outlet obstruction or neurogenic bladder. Resultant mild bilateral hydroureteronephrosis, left greater than right. There is a potentially associated 5.9 cm left posterior bladder diverticulum. 2.Other incidental nonemergent findings as detailed above. Electronically Signed: Santiago Montenegro MD  2/26/2024 6:10 PM EST  Workstation ID: [id number]     Social issues:   Social History     Socioeconomic History    Marital status:    Tobacco Use    Smoking status: Every Day     Packs/day: 0.50     Years: 50.00     Additional pack years: 0.00     Total pack years: 25.00     Types: Cigarettes     Passive exposure: Current    Smokeless tobacco: Never   Vaping Use    Vaping Use: Never used   Substance and Sexual Activity    Alcohol use: Yes     Alcohol/week: 2.0 standard drinks of alcohol     Types: 1 Glasses of wine, 1 Cans of beer per week     Comment: OCC    Drug use: Yes     Types: Marijuana     Comment: medical marijuana    Sexual activity: Defer       NIH Stroke Scale:  Interval: (not recorded)  1a. Level of Consciousness: (not recorded)  1b. LOC Questions: (not recorded)  1c. LOC Commands: (not recorded)  2. Best Gaze: (not recorded)  3. Visual: (not recorded)  4. Facial Palsy: (not recorded)  5a. Motor Arm, Left: (not recorded)  5b. Motor Arm, Right: (not recorded)  6a. Motor Leg, Left: (not recorded)  6b. Motor Leg, Right: (not recorded)  7. Limb Ataxia: (not recorded)  8. Sensory: (not recorded)  9. Best Language: (not recorded)  10. Dysarthria: (not recorded)  11. Extinction and Inattention (formerly Neglect): (not recorded)    Total (NIH Stroke Scale): (not recorded)     Additional notable assessment information:      Nursing report ED to floor:  OBS ROMARIO Montoya, RN   02/26/24 23:05 EST

## 2024-02-28 ENCOUNTER — APPOINTMENT (OUTPATIENT)
Dept: GENERAL RADIOLOGY | Facility: HOSPITAL | Age: 69
DRG: 696 | End: 2024-02-28
Payer: MEDICARE

## 2024-02-28 ENCOUNTER — APPOINTMENT (OUTPATIENT)
Dept: ULTRASOUND IMAGING | Facility: HOSPITAL | Age: 69
DRG: 696 | End: 2024-02-28
Payer: MEDICARE

## 2024-02-28 ENCOUNTER — INPATIENT HOSPITAL (OUTPATIENT)
Dept: URBAN - METROPOLITAN AREA HOSPITAL 84 | Facility: HOSPITAL | Age: 69
End: 2024-02-28
Payer: MEDICARE

## 2024-02-28 DIAGNOSIS — R10.9 UNSPECIFIED ABDOMINAL PAIN: ICD-10-CM

## 2024-02-28 LAB
ANION GAP SERPL CALCULATED.3IONS-SCNC: 9 MMOL/L (ref 5–15)
BASOPHILS # BLD AUTO: 0 10*3/MM3 (ref 0–0.2)
BASOPHILS NFR BLD AUTO: 0.4 % (ref 0–1.5)
BUN SERPL-MCNC: 21 MG/DL (ref 8–23)
BUN/CREAT SERPL: 21.6 (ref 7–25)
CALCIUM SPEC-SCNC: 9.3 MG/DL (ref 8.6–10.5)
CHLORIDE SERPL-SCNC: 102 MMOL/L (ref 98–107)
CO2 SERPL-SCNC: 30 MMOL/L (ref 22–29)
CREAT SERPL-MCNC: 0.97 MG/DL (ref 0.57–1)
DEPRECATED RDW RBC AUTO: 42.4 FL (ref 37–54)
EGFRCR SERPLBLD CKD-EPI 2021: 64.2 ML/MIN/1.73
EOSINOPHIL # BLD AUTO: 0.1 10*3/MM3 (ref 0–0.4)
EOSINOPHIL NFR BLD AUTO: 1.5 % (ref 0.3–6.2)
ERYTHROCYTE [DISTWIDTH] IN BLOOD BY AUTOMATED COUNT: 13.6 % (ref 12.3–15.4)
GLUCOSE SERPL-MCNC: 106 MG/DL (ref 65–99)
HCT VFR BLD AUTO: 37.3 % (ref 34–46.6)
HGB BLD-MCNC: 12.3 G/DL (ref 12–15.9)
LYMPHOCYTES # BLD AUTO: 1.7 10*3/MM3 (ref 0.7–3.1)
LYMPHOCYTES NFR BLD AUTO: 21.6 % (ref 19.6–45.3)
MCH RBC QN AUTO: 29.3 PG (ref 26.6–33)
MCHC RBC AUTO-ENTMCNC: 33 G/DL (ref 31.5–35.7)
MCV RBC AUTO: 88.9 FL (ref 79–97)
MONOCYTES # BLD AUTO: 0.5 10*3/MM3 (ref 0.1–0.9)
MONOCYTES NFR BLD AUTO: 5.9 % (ref 5–12)
NEUTROPHILS NFR BLD AUTO: 5.6 10*3/MM3 (ref 1.7–7)
NEUTROPHILS NFR BLD AUTO: 70.6 % (ref 42.7–76)
NRBC BLD AUTO-RTO: 0 /100 WBC (ref 0–0.2)
PLATELET # BLD AUTO: 129 10*3/MM3 (ref 140–450)
PMV BLD AUTO: 10 FL (ref 6–12)
POTASSIUM SERPL-SCNC: 3.8 MMOL/L (ref 3.5–5.2)
RBC # BLD AUTO: 4.19 10*6/MM3 (ref 3.77–5.28)
SODIUM SERPL-SCNC: 141 MMOL/L (ref 136–145)
WBC NRBC COR # BLD AUTO: 7.9 10*3/MM3 (ref 3.4–10.8)

## 2024-02-28 PROCEDURE — 85025 COMPLETE CBC W/AUTO DIFF WBC: CPT | Performed by: NURSE PRACTITIONER

## 2024-02-28 PROCEDURE — 80048 BASIC METABOLIC PNL TOTAL CA: CPT | Performed by: NURSE PRACTITIONER

## 2024-02-28 PROCEDURE — 99222 1ST HOSP IP/OBS MODERATE 55: CPT | Performed by: NURSE PRACTITIONER

## 2024-02-28 PROCEDURE — 94799 UNLISTED PULMONARY SVC/PX: CPT

## 2024-02-28 PROCEDURE — 74018 RADEX ABDOMEN 1 VIEW: CPT

## 2024-02-28 PROCEDURE — 25810000003 SODIUM CHLORIDE 0.9 % SOLUTION: Performed by: NURSE PRACTITIONER

## 2024-02-28 PROCEDURE — 76775 US EXAM ABDO BACK WALL LIM: CPT

## 2024-02-28 PROCEDURE — 97530 THERAPEUTIC ACTIVITIES: CPT

## 2024-02-28 PROCEDURE — 97116 GAIT TRAINING THERAPY: CPT

## 2024-02-28 RX ADMIN — LIDOCAINE 1 PATCH: 4 PATCH TOPICAL at 08:35

## 2024-02-28 RX ADMIN — SODIUM CHLORIDE 75 ML/HR: 9 INJECTION, SOLUTION INTRAVENOUS at 17:01

## 2024-02-28 RX ADMIN — HYDRALAZINE HYDROCHLORIDE 25 MG: 25 TABLET ORAL at 08:38

## 2024-02-28 RX ADMIN — OXYCODONE 10 MG: 5 TABLET ORAL at 14:01

## 2024-02-28 RX ADMIN — DOCUSATE SODIUM 50MG AND SENNOSIDES 8.6MG 2 TABLET: 8.6; 5 TABLET, FILM COATED ORAL at 08:40

## 2024-02-28 RX ADMIN — HYDRALAZINE HYDROCHLORIDE 25 MG: 25 TABLET ORAL at 20:07

## 2024-02-28 RX ADMIN — CLONIDINE HYDROCHLORIDE 0.2 MG: 0.1 TABLET ORAL at 20:07

## 2024-02-28 RX ADMIN — HYDROCHLOROTHIAZIDE 25 MG: 25 TABLET ORAL at 08:40

## 2024-02-28 RX ADMIN — Medication 10 ML: at 20:08

## 2024-02-28 RX ADMIN — ALBUTEROL SULFATE 2.5 MG: 2.5 SOLUTION RESPIRATORY (INHALATION) at 21:01

## 2024-02-28 RX ADMIN — PANTOPRAZOLE SODIUM 40 MG: 40 TABLET, DELAYED RELEASE ORAL at 08:40

## 2024-02-28 RX ADMIN — ATORVASTATIN CALCIUM 40 MG: 40 TABLET, FILM COATED ORAL at 08:40

## 2024-02-28 RX ADMIN — HYDRALAZINE HYDROCHLORIDE 25 MG: 25 TABLET ORAL at 16:49

## 2024-02-28 RX ADMIN — Medication 10 ML: at 08:46

## 2024-02-28 RX ADMIN — OXYCODONE 10 MG: 5 TABLET ORAL at 20:07

## 2024-02-28 RX ADMIN — PANTOPRAZOLE SODIUM 40 MG: 40 TABLET, DELAYED RELEASE ORAL at 16:49

## 2024-02-28 RX ADMIN — OXYCODONE 10 MG: 5 TABLET ORAL at 06:31

## 2024-02-28 RX ADMIN — LOSARTAN POTASSIUM 100 MG: 50 TABLET, FILM COATED ORAL at 08:40

## 2024-02-28 RX ADMIN — SERTRALINE 150 MG: 100 TABLET, FILM COATED ORAL at 08:38

## 2024-02-28 NOTE — PLAN OF CARE
Problem: Adult Inpatient Plan of Care  Goal: Plan of Care Review  Outcome: Ongoing, Progressing  Flowsheets (Taken 2/28/2024 0333)  Progress: no change  Plan of Care Reviewed With: patient  Goal: Patient-Specific Goal (Individualized)  Outcome: Ongoing, Progressing  Goal: Absence of Hospital-Acquired Illness or Injury  Outcome: Ongoing, Progressing  Intervention: Identify and Manage Fall Risk  Recent Flowsheet Documentation  Taken 2/28/2024 0200 by Ritu Sutton RN  Safety Promotion/Fall Prevention:   safety round/check completed   room organization consistent   nonskid shoes/slippers when out of bed   lighting adjusted   assistive device/personal items within reach   activity supervised   clutter free environment maintained  Taken 2/28/2024 0005 by Ritu Sutton RN  Safety Promotion/Fall Prevention:   safety round/check completed   room organization consistent   nonskid shoes/slippers when out of bed   lighting adjusted   clutter free environment maintained   assistive device/personal items within reach   activity supervised  Taken 2/27/2024 2200 by Ritu Sutton RN  Safety Promotion/Fall Prevention:   safety round/check completed   room organization consistent   nonskid shoes/slippers when out of bed   lighting adjusted   clutter free environment maintained   assistive device/personal items within reach   activity supervised  Taken 2/27/2024 2015 by Ritu Sutton RN  Safety Promotion/Fall Prevention:   safety round/check completed   room organization consistent   nonskid shoes/slippers when out of bed   lighting adjusted   clutter free environment maintained   assistive device/personal items within reach   activity supervised  Intervention: Prevent Skin Injury  Recent Flowsheet Documentation  Taken 2/28/2024 0005 by Ritu Sutton RN  Body Position: position changed independently  Taken 2/27/2024 2015 by Ritu Sutton RN  Body Position: position changed  independently  Intervention: Prevent and Manage VTE (Venous Thromboembolism) Risk  Recent Flowsheet Documentation  Taken 2/28/2024 0005 by Ritu Sutton RN  Activity Management: up ad alyson  Taken 2/27/2024 2015 by Ritu Sutton RN  Activity Management: up ad alyson  Intervention: Prevent Infection  Recent Flowsheet Documentation  Taken 2/28/2024 0200 by Ritu Sutton RN  Infection Prevention:   single patient room provided   rest/sleep promoted   personal protective equipment utilized   hand hygiene promoted  Taken 2/28/2024 0005 by Ritu Sutton RN  Infection Prevention:   single patient room provided   rest/sleep promoted   personal protective equipment utilized   hand hygiene promoted  Taken 2/27/2024 2200 by Ritu Sutton RN  Infection Prevention:   single patient room provided   rest/sleep promoted   personal protective equipment utilized   hand hygiene promoted  Taken 2/27/2024 2015 by Ritu Sutton RN  Infection Prevention:   single patient room provided   rest/sleep promoted   personal protective equipment utilized   hand hygiene promoted  Goal: Optimal Comfort and Wellbeing  Outcome: Ongoing, Progressing  Intervention: Provide Person-Centered Care  Recent Flowsheet Documentation  Taken 2/27/2024 2015 by Ritu Sutton RN  Trust Relationship/Rapport:   care explained   choices provided   questions answered   questions encouraged   reassurance provided   thoughts/feelings acknowledged  Goal: Readiness for Transition of Care  Outcome: Ongoing, Progressing     Problem: Asthma Comorbidity  Goal: Maintenance of Asthma Control  Outcome: Ongoing, Progressing  Intervention: Maintain Asthma Symptom Control  Recent Flowsheet Documentation  Taken 2/28/2024 0200 by Ritu Sutton RN  Medication Review/Management: medications reviewed  Taken 2/28/2024 0005 by Ritu Sutton RN  Medication Review/Management: medications reviewed  Taken 2/27/2024 2200 by Herman  Ritu CERVANTES RN  Medication Review/Management: medications reviewed  Taken 2/27/2024 2015 by Ritu Sutton RN  Medication Review/Management: medications reviewed     Problem: Behavioral Health Comorbidity  Goal: Maintenance of Behavioral Health Symptom Control  Outcome: Ongoing, Progressing  Intervention: Maintain Behavioral Health Symptom Control  Recent Flowsheet Documentation  Taken 2/28/2024 0200 by Ritu Sutton RN  Medication Review/Management: medications reviewed  Taken 2/28/2024 0005 by Ritu Sutton RN  Medication Review/Management: medications reviewed  Taken 2/27/2024 2200 by Ritu Sutton RN  Medication Review/Management: medications reviewed  Taken 2/27/2024 2015 by Ritu Sutton RN  Medication Review/Management: medications reviewed     Problem: COPD (Chronic Obstructive Pulmonary Disease) Comorbidity  Goal: Maintenance of COPD Symptom Control  Outcome: Ongoing, Progressing  Intervention: Maintain COPD-Symptom Control  Recent Flowsheet Documentation  Taken 2/28/2024 0200 by Ritu Sutton RN  Medication Review/Management: medications reviewed  Taken 2/28/2024 0005 by Ritu Sutton RN  Medication Review/Management: medications reviewed  Taken 2/27/2024 2200 by Ritu Sutton RN  Medication Review/Management: medications reviewed  Taken 2/27/2024 2015 by Ritu Sutton RN  Supportive Measures: active listening utilized  Medication Review/Management: medications reviewed     Problem: Diabetes Comorbidity  Goal: Blood Glucose Level Within Targeted Range  Outcome: Ongoing, Progressing     Problem: Heart Failure Comorbidity  Goal: Maintenance of Heart Failure Symptom Control  Outcome: Ongoing, Progressing  Intervention: Maintain Heart Failure-Management  Recent Flowsheet Documentation  Taken 2/28/2024 0200 by Ritu Sutton RN  Medication Review/Management: medications reviewed  Taken 2/28/2024 0005 by Ritu Sutton RN  Medication  Review/Management: medications reviewed  Taken 2/27/2024 2200 by Ritu Sutton RN  Medication Review/Management: medications reviewed  Taken 2/27/2024 2015 by Ritu Sutton RN  Medication Review/Management: medications reviewed     Problem: Hypertension Comorbidity  Goal: Blood Pressure in Desired Range  Outcome: Ongoing, Progressing  Intervention: Maintain Blood Pressure Management  Recent Flowsheet Documentation  Taken 2/28/2024 0200 by Ritu Sutton RN  Medication Review/Management: medications reviewed  Taken 2/28/2024 0005 by Ritu Sutton RN  Medication Review/Management: medications reviewed  Taken 2/27/2024 2200 by Ritu Sutton RN  Medication Review/Management: medications reviewed  Taken 2/27/2024 2015 by Ritu Sutton RN  Medication Review/Management: medications reviewed     Problem: Obstructive Sleep Apnea Risk or Actual Comorbidity Management  Goal: Unobstructed Breathing During Sleep  Outcome: Ongoing, Progressing     Problem: Osteoarthritis Comorbidity  Goal: Maintenance of Osteoarthritis Symptom Control  Outcome: Ongoing, Progressing  Intervention: Maintain Osteoarthritis Symptom Control  Recent Flowsheet Documentation  Taken 2/28/2024 0200 by Ritu Sutton RN  Medication Review/Management: medications reviewed  Taken 2/28/2024 0005 by Ritu Sutton RN  Activity Management: up ad alyson  Medication Review/Management: medications reviewed  Taken 2/27/2024 2200 by Ritu Sutton RN  Medication Review/Management: medications reviewed  Taken 2/27/2024 2015 by Ritu Sutton RN  Activity Management: up ad alyson  Adaptive Equipment Use: used independently  Assistive Device Utilized: cane  Medication Review/Management: medications reviewed     Problem: Pain Chronic (Persistent) (Comorbidity Management)  Goal: Acceptable Pain Control and Functional Ability  Outcome: Ongoing, Progressing  Intervention: Manage Persistent Pain  Recent Flowsheet  Documentation  Taken 2/28/2024 0200 by Ritu Sutton, RN  Medication Review/Management: medications reviewed  Taken 2/28/2024 0005 by Ritu Sutton, RN  Medication Review/Management: medications reviewed  Taken 2/27/2024 2200 by Ritu Sutton, RN  Medication Review/Management: medications reviewed  Taken 2/27/2024 2015 by Ritu Sutton, RN  Medication Review/Management: medications reviewed  Intervention: Optimize Psychosocial Wellbeing  Recent Flowsheet Documentation  Taken 2/27/2024 2015 by Ritu Sutton, RN  Supportive Measures: active listening utilized  Diversional Activities: television  Family/Support System Care:   support provided   self-care encouraged     Problem: Seizure Disorder Comorbidity  Goal: Maintenance of Seizure Control  Outcome: Ongoing, Progressing   Goal Outcome Evaluation:  Plan of Care Reviewed With: patient        Progress: no change

## 2024-02-28 NOTE — PROGRESS NOTES
[facility] Progress note    Patient Name: Mona Sheffield  : 1955  MRN: [id number]  Primary Care Physician: Teresa Fernandez APRN  Date of admission: 2024     Patient Care Team:  Teresa Fernandez APRN as PCP - General (Nurse Practitioner)  Thomas Roa MD as Consulting Physician (Cardiology)  Barby Hopper MD as Consulting Physician (Cardiology)          Subjective   History Present Illness     Chief Complaint:   Chief Complaint   Patient presents with    Abdominal Pain     Pt having abd pain, swelling, incontinent of urine. Some hard to have a BM, does not have the feeling to push.  Pt was here a month ago for same.      Abdominal pain    History of Present Illness    ED 2024  Patient is a 68-year-old female with multiple abdominal surgeries who comes in with abdominal pain and difficulty controlling her bowels and bladder particularly her bladder. She states that she cannot empty fully and sometimes when she stands up she just urinates on herself. She thinks she has a urinary tract infection as this is what it has been in the past.      Observation 2024  Patient agrees with HPI noted above including suprapubic and lower quadrant pain off-and-on for the past month.  She currently rates pain as 8/10.  States that she was admitted a few weeks ago with similar symptoms and diagnosed with cystitis.  She was sent home on antibiotics and noted that she was having urinary incontinence.  She proceeded to the ED yesterday due to difficulty controlling bladder and bowel, especially her bladder.  She thinks that it could be related to significant basic ovarian cyst that she had removed 15 years ago.  Thinks that burning pain could be related to scar tissue.    Observation 24  Pt followed by urology and recommends voiding trail in a few days. Possible cystoscopy then. GI consulted for constipation. Will transfer to hospitalist group.       ROS  Constitutional:  Negative for fever.   Gastrointestinal:  Positive for bloating, abdominal pain, bowel incontinence and constipation. Negative for nausea.   Genitourinary:  Positive for bladder incontinence, flank pain, incomplete emptying and pelvic pain.   All other systems reviewed and are negative.      Personal History     Past Medical History:   Past Medical History:   Diagnosis Date    Arthritis     Asthma     Depression     Hypertension     Injury of back     Pneumonia        Surgical History:      Past Surgical History:   Procedure Laterality Date    APPENDECTOMY      CHOLECYSTECTOMY      HYSTERECTOMY      SPINAL CORD STIMULATOR TRIAL W/ LAMINOTOMY      TONSILLECTOMY             Family History: family history includes Heart disease in her father; Heart failure in her brother and mother; Hypertension in her father and mother. Otherwise pertinent FHx was reviewed and unremarkable.     Social History:  reports that she has been smoking cigarettes. She has a 25.00 pack-year smoking history. She has been exposed to tobacco smoke. She has never used smokeless tobacco. She reports current alcohol use of about 2.0 standard drinks of alcohol per week. She reports current drug use. Drug: Marijuana.      Medications:  Prior to Admission medications    Medication Sig Start Date End Date Taking? Authorizing Provider   albuterol (PROVENTIL) (2.5 MG/3ML) 0.083% nebulizer solution Take 2.5 mg by nebulization Every 4 (Four) Hours As Needed for Wheezing. 7/11/23  Yes Teresa Fernandez APRN   albuterol sulfate  (90 Base) MCG/ACT inhaler albuterol sulfate HFA 90 mcg/actuation aerosol inhaler   INHALE 2 PUFFS BY MOUTH EVERY 6 HOURS AS NEEDED   Yes Emergency, Nurse Hiren, RN   atenolol (TENORMIN) 100 MG tablet TAKE 1 TABLET BY MOUTH EVERY DAY 1/4/24  Yes Teresa Fernandez APRN   atorvastatin (LIPITOR) 40 MG tablet  12/12/23  Yes Provider, MD Olman   Budeson-Glycopyrrol-Formoterol (Breztri Aerosphere) 160-9-4.8 MCG/ACT aerosol  inhaler INHALE 2 PUFFS BY MOUTH TWICE DAILY 9/21/23  Yes Teresa Fernandez APRN   cloNIDine (CATAPRES) 0.2 MG tablet TAKE 1 TABLET BY MOUTH TWICE DAILY 12/12/23  Yes Teresa Fernandez APRN   dexlansoprazole (DEXILANT) 60 MG capsule Take 1 capsule by mouth Daily.   Yes Provider, MD Olman   hydrALAZINE (APRESOLINE) 25 MG tablet TAKE 1 TABLET BY MOUTH THREE TIMES DAILY 11/14/23  Yes Teresa Fernandez APRN   lidocaine (LIDODERM) 5 % Lidoderm 5 % topical patch   APPLY 1 PATCH BY TRANSDERMAL ROUTE ONCE DAILY (MAY WEAR UP TO 12HOURS.)   Yes Emergency, Nurse Epic, RN   Lidocaine, Anorectal, (LMX 5) 5 % cream cream Apply nightly as needed. 8/30/22  Yes Teresa Fernandez APRN   losartan-hydrochlorothiazide (HYZAAR) 100-25 MG per tablet TAKE 1 TABLET BY MOUTH DAILY 12/12/23  Yes Teresa Fernandez APRN   oxyCODONE-acetaminophen (PERCOCET)  MG per tablet Take 1 tablet by mouth 2 (Two) Times a Day As Needed for Moderate Pain. 2/2/24  Yes Teresa Fernandez APRN   sertraline (ZOLOFT) 100 MG tablet TAKE 1 AND 1/2 TABLETS BY MOUTH DAILY 12/12/23  Yes Teresa Fernandez APRN       Allergies:  No Known Allergies    Objective   Objective     Vital Signs  Temp:  [97.5 °F (36.4 °C)-98.4 °F (36.9 °C)] 98.4 °F (36.9 °C)  Heart Rate:  [47-63] 48  Resp:  [12-18] 18  BP: (120-164)/(60-88) 159/75  SpO2:  [92 %-97 %] 97 %  on   ;   Device (Oxygen Therapy): room air  Body mass index is 28.62 kg/m².    Physical Exam    Vitals and nursing note reviewed.   Constitutional:       Appearance: Normal appearance.   HENT:      Head: Normocephalic and atraumatic.      Right Ear: External ear normal.      Left Ear: External ear normal.      Nose: Nose normal.      Mouth/Throat:      Mouth: Mucous membranes are moist.      Pharynx: Oropharynx is clear.   Eyes:      Extraocular Movements: Extraocular movements intact.   Cardiovascular:      Rate and Rhythm: Normal rate and regular rhythm.      Pulses: Normal pulses.      Heart  sounds: Normal heart sounds.   Pulmonary:      Effort: Pulmonary effort is normal.      Breath sounds: Normal breath sounds.   Abdominal:      General: Abdomen is flat. Bowel sounds are normal.      Palpations: Abdomen is soft.      Tenderness: There is abdominal tenderness.      Comments: Scar noted on abdomen, midline.    Musculoskeletal:         General: Normal range of motion.      Cervical back: Normal range of motion.   Skin:     General: Skin is warm.   Neurological:      General: No focal deficit present.      Mental Status: She is alert and oriented to person, place, and time.   Psychiatric:         Mood and Affect: Mood normal.         Behavior: Behavior normal.     Results Review:  I have personally reviewed most recent lab results and radiology images and interpretations and agree with findings, most notably: cbc, cmp, ua, ct abd, lipase.    Results from last 7 days   Lab Units 02/28/24  0348   WBC 10*3/mm3 7.90   HEMOGLOBIN g/dL 12.3   HEMATOCRIT % 37.3   PLATELETS 10*3/mm3 129*     Results from last 7 days   Lab Units 02/28/24  0348 02/27/24  0441 02/26/24  1659   SODIUM mmol/L 141   < > 141   POTASSIUM mmol/L 3.8   < > 3.8   CHLORIDE mmol/L 102   < > 102   CO2 mmol/L 30.0*   < > 27.0   BUN mg/dL 21   < > 22   CREATININE mg/dL 0.97   < > 1.08*   GLUCOSE mg/dL 106*   < > 96   CALCIUM mg/dL 9.3   < > 9.9   ALK PHOS U/L  --   --  63   ALT (SGPT) U/L  --   --  20   AST (SGOT) U/L  --   --  24    < > = values in this interval not displayed.     Estimated Creatinine Clearance: 60.1 mL/min (by C-G formula based on SCr of 0.97 mg/dL).  Brief Urine Lab Results  (Last result in the past 365 days)        Color   Clarity   Blood   Leuk Est   Nitrite   Protein   CREAT   Urine HCG        02/26/24 1704 Yellow   Cloudy   Negative   Negative   Negative   30 mg/dL (1+)                   Microbiology Results (last 10 days)       ** No results found for the last 240 hours. **            ECG/EMG Results (most recent)        Procedure Component Value Units Date/Time    SCANNED - TELEMETRY   [[id number]] Resulted: 02/26/24     Updated: 02/27/24 1157    SCANNED - TELEMETRY   [[id number]] Resulted: 02/26/24     Updated: 02/27/24 1600    SCANNED - TELEMETRY   [[id number]] Resulted: 02/26/24     Updated: 02/28/24 0736    SCANNED - TELEMETRY   [[id number]] Resulted: 02/26/24     Updated: 02/28/24 0959                Results for orders placed during the hospital encounter of 11/02/23    Adult Transthoracic Echo Complete W/ Cont if Necessary Per Protocol    Interpretation Summary    Left ventricular systolic function is normal. Calculated left ventricular EF = 63% Left ventricular ejection fraction appears to be 61 - 65%.    Left ventricular diastolic function is consistent with (grade II w/high LAP) pseudonormalization.    The left atrial cavity is dilated.    Estimated right ventricular systolic pressure from tricuspid regurgitation is normal (<35 mmHg).    No significant valvular abnormalities noted.      CT Abdomen Pelvis With Contrast    Result Date: 2/26/2024  Impression: 1.Urinary bladder distention. Correlate for history of bladder outlet obstruction or neurogenic bladder. Resultant mild bilateral hydroureteronephrosis, left greater than right. There is a potentially associated 5.9 cm left posterior bladder diverticulum. 2.Other incidental nonemergent findings as detailed above. Electronically Signed: Santiago Montenegro MD  2/26/2024 6:10 PM EST  Workstation ID: [id number]       Estimated Creatinine Clearance: 60.1 mL/min (by C-G formula based on SCr of 0.97 mg/dL).    Assessment & Plan   Assessment/Plan       Active Hospital Problems    Diagnosis  POA    **Urinary retention [R33.9]  Yes      Resolved Hospital Problems   No resolved problems to display.     Urinary retention  -CMP showed BUN 29 and creatinine 1.08 but otherwise unremarkable  -CBC and lipase unremarkable  -UA negative for nitrites, blood, bacteria  -CT abdomen pelvis showed urinary  bladder distention and mild bilateral hydroureteronephrosis, left greater than right  -In the ED patient given Norco and iopamidol  -Jackson catheter inserted in the ED and per ED's notes patient had 2000 urinary output shortly after insertion  -Urology consulted and recommends continued jackson and voiding trial in a few days  - possible cystoscopy in future  - Renal u/s pending  -Oxycodone ordered as needed  -Gentle hydration       Constipation  - ct showed constipation  - gi consulted    Hypertension  -Moderately controlled       BP Readings from Last 1 Encounters:   02/27/24 143/85   - Continue clonidine, and hydralazine, and Hyzaar  -Atenolol on hold due to bradycardia  - Monitor while admitted      Hyperlipidemia  -Continue Lipitor     Depression/anxiety  -Continue Zoloft     Overweight  -BMI 28.62  -Lifestyle modifications      VTE Prophylaxis -   Mechanical Order History:        Ordered        02/27/24 0025  Place Sequential Compression Device  Once            02/27/24 0025  Maintain Sequential Compression Device  Continuous                          Pharmalogical Order History:       None            CODE STATUS:    Code Status and Medical Interventions:   Ordered at: 02/27/24 0025     Level Of Support Discussed With:    Patient     Code Status (Patient has no pulse and is not breathing):    CPR (Attempt to Resuscitate)     Medical Interventions (Patient has pulse or is breathing):    Full Support       This patient has been examined wearing personal protective equipment.     I discussed the patient's findings and my recommendations with patient and nursing staff.      Signature:Electronically signed by Aleida Paz PA-C, 02/28/24, 10:20 AM EST.

## 2024-02-28 NOTE — THERAPY TREATMENT NOTE
"Subjective: Pt agreeable to therapeutic plan of care.    Objective:     Bed mobility - Modified-Independent    Transfers - Supervision    Ambulation - 80 feet CGA while holding onto IV pole. Pt had one minor LOB while ambulating, but did not require assistance to regain balance.    Vitals: WNL    Pain: 3 VAS   Location: back  Intervention for pain: Increased Activity and Therapeutic Presence    Education: Provided education on the importance of mobility in the acute care setting, Verbal/Tactile Cues, Transfer Training, and Gait Training    Assessment: Mona Sheffield presents with functional mobility impairments which indicate the need for skilled intervention. Mild unsteadiness noted while ambulating. Pt given extensive education on ensuring she is using safest AD at home (Rwx) to decrease risk of injurious falls. Will continue to follow and progress as tolerated.     Plan/Recommendations:   If medically appropriate, Low Intensity Therapy recommended post-acute care - This is recommended as therapy feels this patient would require 2-3 visits per week. OP or HH would be the best option depending on patient's home bound status. Consider, if the patient has other  \"skilled\" needs such as wounds, IV antibiotics, etc. Combined with \"low intensity\" could also equate to a SNF. If patient is medically complex, consider LTAC. Pt requires no DME at discharge.     Pt desires Home with Home Health at discharge. Pt cooperative; agreeable to therapeutic recommendations and plan of care.         Basic Mobility 6-click:  Rollin = Total, A lot = 2, A little = 3; 4 = None  Supine>Sit:   1 = Total, A lot = 2, A little = 3; 4 = None   Sit>Stand with arms:  1 = Total, A lot = 2, A little = 3; 4 = None  Bed>Chair:   1 = Total, A lot = 2, A little = 3; 4 = None  Ambulate in room:  1 = Total, A lot = 2, A little = 3; 4 = None  3-5 Steps with railin = Total, A lot = 2, A little = 3; 4 = None  Score: 22    Modified Mariaelena: " N/A = No pre-op stroke/TIA    Post-Tx Position: Supine with HOB Elevated, Alarms activated, and Call light and personal items within reach  PPE: gloves

## 2024-02-28 NOTE — CONSULTS
[facility] Medicine Services  Consult Note    Patient Name: Mona Sheffield  : 1955  MRN: [id number]  Primary Care Physician:  Teresa Fernandez APRN  Referring Physician: No ref. provider found  Date of admission: 2024  Date and Time of Care: 24 3pm    Consults      Reason for Consult/ Chief Complaint: abd pain    Consult Requested By: ER provider    Subjective:     History of Present Illness:   68-year-old female with multiple abdominal surgeries was admitted with abdominal pain and difficulty controlling her bowels and bladder.  She states that she cannot empty fully and sometimes when she stands up she just urinates on herself.She proceeded to the ED yesterday due to difficulty controlling bladder and bowel, especially her bladder.Pt followed by urology and recommends voiding trail in a few days.  GI consulted for constipation.  Review of Systems:   Review of Systems  Constitutional: Negative for fever.   Gastrointestinal:  Positive for bloating, abdominal pain, bowel incontinence and constipation. Negative for nausea.   Genitourinary:  Positive for bladder incontinence, flank pain, incomplete emptying and pelvic pain.   All other systems reviewed and are negative.  Personal History:     Past Medical History:   Diagnosis Date    Arthritis     Asthma     Depression     Hypertension     Injury of back     Pneumonia        Past Surgical History:   Procedure Laterality Date    APPENDECTOMY      CHOLECYSTECTOMY      HYSTERECTOMY      SPINAL CORD STIMULATOR TRIAL W/ LAMINOTOMY      TONSILLECTOMY         Family History: family history includes Heart disease in her father; Heart failure in her brother and mother; Hypertension in her father and mother. Otherwise pertinent FHx was reviewed and not pertinent to current issue.    Social History:  reports that she has been smoking cigarettes. She has a 25.00 pack-year smoking history. She has been exposed to tobacco smoke. She has never used  smokeless tobacco. She reports current alcohol use of about 2.0 standard drinks of alcohol per week. She reports current drug use. Drug: Marijuana.    Home Medications:   Budeson-Glycopyrrol-Formoterol, Lidocaine (Anorectal), albuterol, albuterol sulfate HFA, atenolol, atorvastatin, cloNIDine, dexlansoprazole, hydrALAZINE, lidocaine, losartan-hydrochlorothiazide, oxyCODONE-acetaminophen, and sertraline    Allergies:  No Known Allergies      Objective:     Vital Signs  Temp:  [97.5 °F (36.4 °C)-98.4 °F (36.9 °C)] 97.9 °F (36.6 °C)  Heart Rate:  [47-63] 53  Resp:  [12-18] 16  BP: (120-164)/(60-88) 144/68   Body mass index is 28.62 kg/m².    Physical Exam  Physical Exam  Constitutional:       Appearance: Normal appearance.   HENT:      Head: Normocephalic and atraumatic.      Right Ear: External ear normal.      Left Ear: External ear normal.      Nose: Nose normal.      Mouth/Throat:      Mouth: Mucous membranes are moist.      Pharynx: Oropharynx is clear.   Eyes:      Extraocular Movements: Extraocular movements intact.   Cardiovascular:      Rate and Rhythm: Normal rate and regular rhythm.      Pulses: Normal pulses.      Heart sounds: Normal heart sounds.   Pulmonary:      Effort: Pulmonary effort is normal.      Breath sounds: Normal breath sounds.   Abdominal:      General: Abdomen is flat. Bowel sounds are normal.      Palpations: Abdomen is soft.      Tenderness: There is abdominal tenderness.      Comments: Scar noted on abdomen, midline.    Musculoskeletal:         General: Normal range of motion.      Cervical back: Normal range of motion.   Skin:     General: Skin is warm.   Neurological:      General: No focal deficit present.      Mental Status: She is alert and oriented to person, place, and time.   Psychiatric:         Mood and Affect: Mood normal.         Behavior: Behavior normal.     Scheduled Meds   atorvastatin, 40 mg, Oral, Daily  cloNIDine, 0.2 mg, Oral, BID  hydrALAZINE, 25 mg, Oral,  TID  losartan, 100 mg, Oral, Q24H   And  hydroCHLOROthiazide, 25 mg, Oral, Q24H  Lidocaine, 1 patch, Transdermal, Q24H  pantoprazole, 40 mg, Oral, BID AC  senna-docusate sodium, 2 tablet, Oral, BID  sertraline, 150 mg, Oral, Daily  sodium chloride, 10 mL, Intravenous, Q12H       PRN Meds     acetaminophen    albuterol    senna-docusate sodium **AND** polyethylene glycol **AND** bisacodyl **AND** bisacodyl    melatonin    nitroglycerin    ondansetron    oxyCODONE    [COMPLETED] Insert Peripheral IV **AND** sodium chloride    sodium chloride    sodium chloride   Infusions  sodium chloride, 75 mL/hr, Last Rate: 75 mL/hr (02/28/24 0637)          Diagnostic Data    Results from last 7 days   Lab Units 02/28/24  0348 02/27/24  0441 02/26/24  1659   WBC 10*3/mm3 7.90   < > 9.10   HEMOGLOBIN g/dL 12.3   < > 13.7   HEMATOCRIT % 37.3   < > 41.5   PLATELETS 10*3/mm3 129*   < > 166   GLUCOSE mg/dL 106*   < > 96   CREATININE mg/dL 0.97   < > 1.08*   BUN mg/dL 21   < > 22   SODIUM mmol/L 141   < > 141   POTASSIUM mmol/L 3.8   < > 3.8   AST (SGOT) U/L  --   --  24   ALT (SGPT) U/L  --   --  20   ALK PHOS U/L  --   --  63   BILIRUBIN mg/dL  --   --  0.5   ANION GAP mmol/L 9.0   < > 12.0    < > = values in this interval not displayed.       US Renal Bilateral    Result Date: 2/28/2024  1. No hydronephrosis. 2. Small right renal cyst. Previously documented left renal cyst on CT abdomen are not visualized on today's ultrasound. 3. No renal stone is identified. Electronically Signed: Marcie Cramer MD  2/28/2024 10:16 AM EST  Workstation ID: [id number]    CT Abdomen Pelvis With Contrast    Result Date: 2/26/2024  Impression: 1.Urinary bladder distention. Correlate for history of bladder outlet obstruction or neurogenic bladder. Resultant mild bilateral hydroureteronephrosis, left greater than right. There is a potentially associated 5.9 cm left posterior bladder diverticulum. 2.Other incidental nonemergent findings as detailed above.  Electronically Signed: Santiago Montenegro MD  2/26/2024 6:10 PM EST  Workstation ID: [id number]       I reviewed the patient's new clinical results.  I reviewed the patient's new imaging results and agree with the interpretation.    Assessment/Plan:     Active and Resolved Problems  Active Hospital Problems    Diagnosis  POA    **Urinary retention [R33.9]  Yes      Resolved Hospital Problems   No resolved problems to display.     Urinary retention  -CMP showed BUN 29 and creatinine 1.08 but otherwise unremarkable  -CBC and lipase unremarkable  -UA negative for nitrites, blood, bacteria  -CT abdomen pelvis showed urinary bladder distention and mild bilateral hydroureteronephrosis, left greater than right  -In the ED patient given Norco and iopamidol  -Jackson catheter inserted in the ED and per ED's notes patient had 2000 urinary output shortly after insertion  -Urology consulted and recommends continued jackson and voiding trial in a few days  - possible cystoscopy in future  - Renal u/s pending  -Oxycodone ordered as needed  -Gentle hydration        Constipation  - ct showed constipation  - gi consulted     Hypertension  -Moderately controlled   - Continue clonidine, and hydralazine, and Hyzaar  -Atenolol on hold due to bradycardia  - Monitor while admitted      Hyperlipidemia  -Continue Lipitor     Depression/anxiety  -Continue Zoloft     Overweight  -BMI 28.62  -Lifestyle modifications    DVT prophylaxis:  Mechanical DVT prophylaxis orders are present.         Code status is   Code Status and Medical Interventions:   Ordered at: 02/27/24 0025     Level Of Support Discussed With:    Patient     Code Status (Patient has no pulse and is not breathing):    CPR (Attempt to Resuscitate)     Medical Interventions (Patient has pulse or is breathing):    Full Support       Plan for disposition:2-3 days    Time: 30 minutes        Signature: Electronically signed by Loyd Acevedo MD, 02/28/24, 10:38 EST.  [facility]  Team

## 2024-02-28 NOTE — PLAN OF CARE
"Assessment: Mona Sheffield presents with functional mobility impairments which indicate the need for skilled intervention. Mild unsteadiness noted while ambulating. Pt given extensive education on ensuring she is using safest AD at home (Rwx) to decrease risk of injurious falls. Will continue to follow and progress as tolerated.     Plan/Recommendations:   If medically appropriate, Low Intensity Therapy recommended post-acute care - This is recommended as therapy feels this patient would require 2-3 visits per week. OP or HH would be the best option depending on patient's home bound status. Consider, if the patient has other  \"skilled\" needs such as wounds, IV antibiotics, etc. Combined with \"low intensity\" could also equate to a SNF. If patient is medically complex, consider LTAC. Pt requires no DME at discharge.     Pt desires Home with Home Health at discharge. Pt cooperative; agreeable to therapeutic recommendations and plan of care.     "

## 2024-02-28 NOTE — CONSULTS
Urology Consult Note    Patient:Mona Sheffield :1955  Room:224/1  Admit Date2024  Age:68 y.o.     SEX:female     DOS:2024     MR:[id number]     Visit:[id number]       Attending: Jackson Gaines MD  Referring Provider: Dr. Gaines  Reason for Consultation: Urinary retention    Patient Care Team:  Teresa Fernandez APRN as PCP - General (Nurse Practitioner)  Thomas Roa MD as Consulting Physician (Cardiology)  Barby Hopper MD as Consulting Physician (Cardiology)    Chief complaint urinary retention    Subjective .     History of present illness: Patient is a 68-year-old female with multiple medical problems who was noted to have urinary retention of about 2 L for which a Owen catheter was placed.  Her CT scan also revealed bilateral hydronephrosis with a large bladder diverticulum she was voiding fine a couple months ago but has been having severe constipation and is now not voiding fine.  Her urine culture from 2024 is negative.  Her creatinine is normal    Review of Systems  12 point review of systems were reviewed and are negative except for what is in HPI.    History  Past Medical History:   Diagnosis Date    Arthritis     Asthma     Depression     Hypertension     Injury of back     Pneumonia      Past Surgical History:   Procedure Laterality Date    APPENDECTOMY      CHOLECYSTECTOMY      HYSTERECTOMY      SPINAL CORD STIMULATOR TRIAL W/ LAMINOTOMY      TONSILLECTOMY       Social History     Socioeconomic History    Marital status:    Tobacco Use    Smoking status: Every Day     Packs/day: 0.50     Years: 50.00     Additional pack years: 0.00     Total pack years: 25.00     Types: Cigarettes     Passive exposure: Current    Smokeless tobacco: Never   Vaping Use    Vaping Use: Never used   Substance and Sexual Activity    Alcohol use: Yes     Alcohol/week: 2.0 standard drinks of alcohol     Types: 1 Glasses of wine, 1 Cans of beer per week     Comment: OCC    Drug  use: Yes     Types: Marijuana     Comment: medical marijuana    Sexual activity: Defer     Family History   Problem Relation Age of Onset    Hypertension Mother     Heart failure Mother     Hypertension Father     Heart disease Father     Heart failure Brother      No Known Allergies  Prior to Admission medications    Medication Sig Start Date End Date Taking? Authorizing Provider   albuterol (PROVENTIL) (2.5 MG/3ML) 0.083% nebulizer solution Take 2.5 mg by nebulization Every 4 (Four) Hours As Needed for Wheezing. 7/11/23  Yes Teresa Fernandez APRN   albuterol sulfate  (90 Base) MCG/ACT inhaler albuterol sulfate HFA 90 mcg/actuation aerosol inhaler   INHALE 2 PUFFS BY MOUTH EVERY 6 HOURS AS NEEDED   Yes Emergency, Nurse ROMARIO Maradiaga   atenolol (TENORMIN) 100 MG tablet TAKE 1 TABLET BY MOUTH EVERY DAY 1/4/24  Yes Teresa Fernandez APRN   atorvastatin (LIPITOR) 40 MG tablet  12/12/23  Yes Provider, MD Olman   Budeson-Glycopyrrol-Formoterol (Breztri Aerosphere) 160-9-4.8 MCG/ACT aerosol inhaler INHALE 2 PUFFS BY MOUTH TWICE DAILY 9/21/23  Yes Teresa Fernandez APRN   cloNIDine (CATAPRES) 0.2 MG tablet TAKE 1 TABLET BY MOUTH TWICE DAILY 12/12/23  Yes Teresa Fernandez APRN   dexlansoprazole (DEXILANT) 60 MG capsule Take 1 capsule by mouth Daily.   Yes Provider, MD Olman   hydrALAZINE (APRESOLINE) 25 MG tablet TAKE 1 TABLET BY MOUTH THREE TIMES DAILY 11/14/23  Yes Teresa Fernandez APRN   lidocaine (LIDODERM) 5 % Lidoderm 5 % topical patch   APPLY 1 PATCH BY TRANSDERMAL ROUTE ONCE DAILY (MAY WEAR UP TO 12HOURS.)   Yes Emergency, Nurse Epic, RN   Lidocaine, Anorectal, (LMX 5) 5 % cream cream Apply nightly as needed. 8/30/22  Yes Teresa Fernandez APRN   losartan-hydrochlorothiazide (HYZAAR) 100-25 MG per tablet TAKE 1 TABLET BY MOUTH DAILY 12/12/23  Yes Teresa Fernandez APRN   oxyCODONE-acetaminophen (PERCOCET)  MG per tablet Take 1 tablet by mouth 2 (Two) Times a Day As Needed  for Moderate Pain. 24  Yes Teresa Fernandez APRN   sertraline (ZOLOFT) 100 MG tablet TAKE 1 AND 1/2 TABLETS BY MOUTH DAILY 23  Yes Teresa Fernandez APRN         Objective     tMax 24 hours:  Temp (24hrs), Av.9 °F (36.6 °C), Min:97.5 °F (36.4 °C), Max:98.4 °F (36.9 °C)    Vital Sign Ranges:  Temp:  [97.5 °F (36.4 °C)-98.4 °F (36.9 °C)] 98.4 °F (36.9 °C)  Heart Rate:  [47-63] 58  Resp:  [12-18] 18  BP: (120-164)/(53-88) 162/83  Intake and Output Last 3 Shifts:  I/O last 3 completed shifts:  In: 1070 [P.O.:1070]  Out: 4950 [Urine:4950]      Physical Exam:     General Appearance:  Alert, cooperative, in no acute distress   Head:  Normocephalic, without obvious abnormality, atraumatic   Eyes:  Lids and lashes normal, conjunctivae and sclerae normal, no icterus, no pallor, corneas clear, PERRLA   Ears:  Ears appear intact with no abnormalities noted   Throat:  No oral lesions, no thrush, oral mucosa moist   Neck:  No adenopathy, supple, trachea midline, no thyromegaly, no carotid bruit, no JVD   Back:  No kyphosis present, no scoliosis present, no skin lesions, erythema or scars, no tenderness to percussion, or palpation, range of motion normal   Lungs:  Clear to auscultation,respirations regular, even and unlabored    Heart:  Regular rhythm and normal rate, normal S1 and S2, no murmur, no gallop, no rub, no click   Breast Exam:  Deferred   Abdomen:  Normal bowel sounds, no masses, no organomegaly, soft non-tender, non-distended, no guarding, no rebound tenderness   Genitalia:  Deferred   Extremities:  Moves all extremities well, no edema, no cyanosis, no redness   Pulses:  Pulses palpable and equal bilaterally   Skin:  No bleeding, bruising or rash   Lymph nodes:  No palpable adenopathy   Neurologic:  Cranial nerves 2 - 12 grossly intact, sensation intact, DTR present and equal bilaterally       Results Review:     Lab Results (last 24 hours)       Procedure Component Value Units Date/Time    Basic  "Metabolic Panel [[id number]]  (Abnormal) Collected: 02/28/24 0348    Specimen: Blood from Arm, Right Updated: 02/28/24 0505     Glucose 106 mg/dL      BUN 21 mg/dL      Creatinine 0.97 mg/dL      Sodium 141 mmol/L      Potassium 3.8 mmol/L      Chloride 102 mmol/L      CO2 30.0 mmol/L      Calcium 9.3 mg/dL      BUN/Creatinine Ratio 21.6     Anion Gap 9.0 mmol/L      eGFR 64.2 mL/min/1.73     Narrative:      GFR Normal >60  Chronic Kidney Disease <60  Kidney Failure <15      CBC & Differential [[id number]]  (Abnormal) Collected: 02/28/24 0348    Specimen: Blood from Arm, Right Updated: 02/28/24 0441    Narrative:      The following orders were created for panel order CBC & Differential.  Procedure                               Abnormality         Status                     ---------                               -----------         ------                     CBC Auto Differential[[id number]]        Abnormal            Final result                 Please view results for these tests on the individual orders.    CBC Auto Differential [[id number]]  (Abnormal) Collected: 02/28/24 0348    Specimen: Blood from Arm, Right Updated: 02/28/24 0441     WBC 7.90 10*3/mm3      RBC 4.19 10*6/mm3      Hemoglobin 12.3 g/dL      Hematocrit 37.3 %      MCV 88.9 fL      MCH 29.3 pg      MCHC 33.0 g/dL      RDW 13.6 %      RDW-SD 42.4 fl      MPV 10.0 fL      Platelets 129 10*3/mm3      Neutrophil % 70.6 %      Lymphocyte % 21.6 %      Monocyte % 5.9 %      Eosinophil % 1.5 %      Basophil % 0.4 %      Neutrophils, Absolute 5.60 10*3/mm3      Lymphocytes, Absolute 1.70 10*3/mm3      Monocytes, Absolute 0.50 10*3/mm3      Eosinophils, Absolute 0.10 10*3/mm3      Basophils, Absolute 0.00 10*3/mm3      nRBC 0.0 /100 WBC            No results found for: \"URINECX\"     Imaging Results (Last 7 Days)       Procedure Component Value Units Date/Time    CT Abdomen Pelvis With Contrast [[id number]] Collected: 02/26/24 1806     Updated: 02/26/24 1812    " Narrative:      CT ABDOMEN PELVIS W CONTRAST    Date of Exam: 2/26/2024 5:36 PM EST    Indication: abd pain.    Comparison: None available.    Technique: Axial CT images were obtained of the abdomen and pelvis following the uneventful intravenous administration of 100 cc Isovue-370. Sagittal and coronal reconstructions were performed.  Automated exposure control and iterative reconstruction   methods were used.        Findings:  LUNG BASES: There is left basal atelectasis.    LIVER:  Unremarkable parenchyma without focal lesion.  BILIARY/GALLBLADDER: Cholecystectomy  SPLEEN:  Unremarkable  PANCREAS:  Unremarkable  ADRENAL:  Unremarkable  KIDNEYS:  Unremarkable parenchyma with no solid mass identified. There is mild bilateral hydroureteronephrosis, left more so than right. This is presumably related to bladder distention. No calculus identified.  GASTROINTESTINAL/MESENTERY:  No evidence of obstruction nor inflammation.    MESENTERIC VESSELS:  Patent.  AORTA/IVC:  Normal caliber.    RETROPERITONEUM/LYMPH NODES:  Unremarkable    REPRODUCTIVE:  Unremarkable  BLADDER: There is urinary bladder distention. Correlate for history of bladder outlet obstruction or neurogenic bladder. There is a 5.9 cm left posterior lateral diverticulum.    OSSEUS STRUCTURES:  Typical for age with no acute process identified.        Impression:      Impression:  1.Urinary bladder distention. Correlate for history of bladder outlet obstruction or neurogenic bladder. Resultant mild bilateral hydroureteronephrosis, left greater than right. There is a potentially associated 5.9 cm left posterior bladder   diverticulum.  2.Other incidental nonemergent findings as detailed above.            Electronically Signed: Santiago Montenegro MD    2/26/2024 6:10 PM EST    Workstation ID: [id number]            Inpatient Meds:   Scheduled Meds:atorvastatin, 40 mg, Oral, Daily  cloNIDine, 0.2 mg, Oral, BID  hydrALAZINE, 25 mg, Oral, TID  losartan, 100 mg, Oral, Q24H    And  hydroCHLOROthiazide, 25 mg, Oral, Q24H  Lidocaine, 1 patch, Transdermal, Q24H  pantoprazole, 40 mg, Oral, BID AC  senna-docusate sodium, 2 tablet, Oral, BID  sertraline, 150 mg, Oral, Daily  sodium chloride, 10 mL, Intravenous, Q12H       Continuous Infusions:sodium chloride, 75 mL/hr, Last Rate: 75 mL/hr (02/28/24 0637)       PRN Meds:.  acetaminophen    albuterol    senna-docusate sodium **AND** polyethylene glycol **AND** bisacodyl **AND** bisacodyl    melatonin    nitroglycerin    ondansetron    oxyCODONE    [COMPLETED] Insert Peripheral IV **AND** sodium chloride    sodium chloride    sodium chloride      Assessment & Plan     Urinary retention most likely secondary to her severe constipation.  GI has been consulted.  Her bladder was so full that was probably putting back pressure on her kidneys which accounts for the bilateral hydronephrosis.  We will get an ultrasound to make sure the hydronephrosis is resolving with a Owen catheter placement.  Will give her a voiding trial in a couple of days and she may need a bedside cystoscopy as well    I discussed the patient's findings and my recommendations with patient.    Adrian Power MD  02/28/24  06:40 EST

## 2024-02-28 NOTE — CONSULTS
GI CONSULT  NOTE:    Referring Provider:  ELLIOTT Davalos    Chief complaint: Abdominal pain    Subjective .     History of present illness: Mona Sheffield is a 68 y.o. female with history of anxiety/depression, hypertension, hyperlipidemia, appendectomy, hysterectomy, and cholecystectomy who presents with complaints of abdominal pain.  The patient reports that she has been having generalized abdominal pain for the past week.  States pain is intermittent and is described as sharp/cramping in nature.  Unable to identify any exacerbating or alleviating factors.  She states that she typically has bowel movements daily, but does not feel that she evacuates completely.  Denies any current bowel regimen.  No bright red blood per rectum or melena.  She does complain of some associated nausea, but denies vomiting.  Also reports intermittent heartburn along with dysphagia to solid foods.  Denies any NSAID use.  Does admit to taking oxycodone as needed.      Endo History:  Patient reports previous colonoscopy in [place] approximately 2 to 5 years ago.  Does report a history of colon polyps.    Past Medical History:  Past Medical History:   Diagnosis Date    Arthritis     Asthma     Depression     Hypertension     Injury of back     Pneumonia        Past Surgical History:  Past Surgical History:   Procedure Laterality Date    APPENDECTOMY      CHOLECYSTECTOMY      HYSTERECTOMY      SPINAL CORD STIMULATOR TRIAL W/ LAMINOTOMY      TONSILLECTOMY         Social History:  Social History     Tobacco Use    Smoking status: Every Day     Packs/day: 0.50     Years: 50.00     Additional pack years: 0.00     Total pack years: 25.00     Types: Cigarettes     Passive exposure: Current    Smokeless tobacco: Never   Vaping Use    Vaping Use: Never used   Substance Use Topics    Alcohol use: Yes     Alcohol/week: 2.0 standard drinks of alcohol     Types: 1 Glasses of wine, 1 Cans of beer per week     Comment: OCC    Drug use: Yes      Types: Marijuana     Comment: medical marijuana       Family History:  Family History   Problem Relation Age of Onset    Hypertension Mother     Heart failure Mother     Hypertension Father     Heart disease Father     Heart failure Brother        Medications:  Medications Prior to Admission   Medication Sig Dispense Refill Last Dose    albuterol (PROVENTIL) (2.5 MG/3ML) 0.083% nebulizer solution Take 2.5 mg by nebulization Every 4 (Four) Hours As Needed for Wheezing. 180 mL 0 2/25/2024 at 2200    albuterol sulfate  (90 Base) MCG/ACT inhaler albuterol sulfate HFA 90 mcg/actuation aerosol inhaler   INHALE 2 PUFFS BY MOUTH EVERY 6 HOURS AS NEEDED   2/26/2024 at 1000    atenolol (TENORMIN) 100 MG tablet TAKE 1 TABLET BY MOUTH EVERY DAY 90 tablet 0 2/26/2024 at 1000    atorvastatin (LIPITOR) 40 MG tablet    2/26/2024 at 1000    Budeson-Glycopyrrol-Formoterol (Breztri Aerosphere) 160-9-4.8 MCG/ACT aerosol inhaler INHALE 2 PUFFS BY MOUTH TWICE DAILY 10.7 g 2 2/26/2024 at 1000    cloNIDine (CATAPRES) 0.2 MG tablet TAKE 1 TABLET BY MOUTH TWICE DAILY 180 tablet 1 2/26/2024 at 1000    dexlansoprazole (DEXILANT) 60 MG capsule Take 1 capsule by mouth Daily.   2/26/2024 at 1000    hydrALAZINE (APRESOLINE) 25 MG tablet TAKE 1 TABLET BY MOUTH THREE TIMES DAILY 270 tablet 0 2/26/2024 at 1000    lidocaine (LIDODERM) 5 % Lidoderm 5 % topical patch   APPLY 1 PATCH BY TRANSDERMAL ROUTE ONCE DAILY (MAY WEAR UP TO 12HOURS.)   Past Week    Lidocaine, Anorectal, (LMX 5) 5 % cream cream Apply nightly as needed. 60 g 1 2/25/2024 at 2200    losartan-hydrochlorothiazide (HYZAAR) 100-25 MG per tablet TAKE 1 TABLET BY MOUTH DAILY 90 tablet 0 2/26/2024 at 1000    oxyCODONE-acetaminophen (PERCOCET)  MG per tablet Take 1 tablet by mouth 2 (Two) Times a Day As Needed for Moderate Pain. 60 tablet 0 2/25/2024 at 2200    sertraline (ZOLOFT) 100 MG tablet TAKE 1 AND 1/2 TABLETS BY MOUTH DAILY 135 tablet 0 2/26/2024 at 1000       Scheduled  Meds:atorvastatin, 40 mg, Oral, Daily  cloNIDine, 0.2 mg, Oral, BID  hydrALAZINE, 25 mg, Oral, TID  losartan, 100 mg, Oral, Q24H   And  hydroCHLOROthiazide, 25 mg, Oral, Q24H  Lidocaine, 1 patch, Transdermal, Q24H  pantoprazole, 40 mg, Oral, BID AC  senna-docusate sodium, 2 tablet, Oral, BID  sertraline, 150 mg, Oral, Daily  sodium chloride, 10 mL, Intravenous, Q12H      Continuous Infusions:sodium chloride, 75 mL/hr, Last Rate: 75 mL/hr (02/28/24 0637)      PRN Meds:.  acetaminophen    albuterol    senna-docusate sodium **AND** polyethylene glycol **AND** bisacodyl **AND** bisacodyl    melatonin    nitroglycerin    ondansetron    oxyCODONE    [COMPLETED] Insert Peripheral IV **AND** sodium chloride    sodium chloride    sodium chloride    ALLERGIES:  Patient has no known allergies.    ROS:  The following systems were reviewed and negative;   Constitution:  No fevers, chills, no unintentional weight loss  Skin: no rash, no jaundice  Eyes:  No blurry vision, no eye pain  HENT:  No change in hearing or smell  Resp:  No dyspnea or cough  CV:  No chest pain or palpitations  :  No dysuria, hematuria  Musculoskeletal:  No leg cramps or arthralgias  Neuro:  No tremor, no numbness  Psych:  No depression or confusion    Objective     Vital Signs:   Vitals:    02/28/24 0222 02/28/24 0619 02/28/24 0838 02/28/24 1031   BP: 164/84 162/83 159/75 144/68   BP Location: Right arm Right arm  Right arm   Patient Position: Lying Lying  Lying   Pulse: 51 58 (!) 48 53   Resp: 18 18  16   Temp:  98.4 °F (36.9 °C)  97.9 °F (36.6 °C)   TempSrc: Oral Oral  Oral   SpO2: 92% 97%  92%   Weight:       Height:           Physical Exam:       General Appearance:    Awake and alert, in no acute distress   Head:    Normocephalic, without obvious abnormality, atraumatic   Throat:   No oral lesions, no thrush, oral mucosa moist   Lungs:     Respirations regular, even and unlabored   Chest Wall:    No abnormalities observed   Abdomen:     Soft,  "generalized tenderness, no rebound or guarding, non-distended   Rectal:     Deferred   Extremities:   Moves all extremities, no edema, no cyanosis   Pulses:   Pulses palpable and equal bilaterally   Skin:   No rash, no jaundice, normal palpation   Lymph nodes:   No cervical, supraclavicular or submandibular palpable adenopathy   Neurologic:   Cranial nerves 2 - 12 grossly intact, no asterixis       Results Review:   I reviewed the patient's labs and imaging.  CBC    Results from last 7 days   Lab Units 02/28/24 0348 02/27/24 0441 02/26/24  1659   WBC 10*3/mm3 7.90 9.00 9.10   HEMOGLOBIN g/dL 12.3 12.6 13.7   PLATELETS 10*3/mm3 129* 140 166     CMP   Results from last 7 days   Lab Units 02/28/24 0348 02/27/24  0441 02/26/24  1659   SODIUM mmol/L 141 142 141   POTASSIUM mmol/L 3.8 3.5 3.8   CHLORIDE mmol/L 102 103 102   CO2 mmol/L 30.0* 28.0 27.0   BUN mg/dL 21 29* 22   CREATININE mg/dL 0.97 1.08* 1.08*   GLUCOSE mg/dL 106* 98 96   ALBUMIN g/dL  --   --  4.3   BILIRUBIN mg/dL  --   --  0.5   ALK PHOS U/L  --   --  63   AST (SGOT) U/L  --   --  24   ALT (SGPT) U/L  --   --  20   LIPASE U/L  --   --  45     Cr Clearance Estimated Creatinine Clearance: 60.1 mL/min (by C-G formula based on SCr of 0.97 mg/dL).  Coag     HbA1C   Lab Results   Component Value Date    HGBA1C 6.20 (H) 07/11/2023    HGBA1C 6.0 (H) 02/14/2023     Blood Glucose No results found for: \"POCGLU\"  Infection     UA    Results from last 7 days   Lab Units 02/26/24  1704   NITRITE UA  Negative   WBC UA /HPF 0-2   BACTERIA UA /HPF None Seen   SQUAM EPITHEL UA /HPF 0-2     Imaging Results (Last 72 Hours)       Procedure Component Value Units Date/Time    US Renal Bilateral [[id number]] Collected: 02/28/24 1012     Updated: 02/28/24 1018    Narrative:      US RENAL BILATERAL    Date of Exam: 2/28/2024 9:41 AM EST    Indication: hydronephrosis.    Comparison: CT abdomen pelvis 2/26/2024.    Technique: Grayscale and color Doppler ultrasound evaluation of " the kidneys and urinary bladder was performed.      Findings:  The right kidney measures 8.7 x 4.6 x 5.4 cm. Right kidney maintains normal cortical thickness and cortical echotexture. The left kidney measures 9.1 x 4.8 x 5.2 cm, it maintains normal cortical thickness and echotexture. A cyst at the right upper renal   pole measures 9 mm. No cystic or solid left renal abnormality is identified. No shadowing echogenic renal stone is identified on either side. There is no right or left hydronephrosis. Cyst seen within the left kidney on prior CT are not identified on   today's examination. Urinary bladder is not visualized, decompressed by Owen catheter          Impression:      1. No hydronephrosis.  2. Small right renal cyst. Previously documented left renal cyst on CT abdomen are not visualized on today's ultrasound.  3. No renal stone is identified.        Electronically Signed: Marcie Cramer MD    2/28/2024 10:16 AM EST    Workstation ID: [id number]    CT Abdomen Pelvis With Contrast [[id number]] Collected: 02/26/24 1806     Updated: 02/26/24 1812    Narrative:      CT ABDOMEN PELVIS W CONTRAST    Date of Exam: 2/26/2024 5:36 PM EST    Indication: abd pain.    Comparison: None available.    Technique: Axial CT images were obtained of the abdomen and pelvis following the uneventful intravenous administration of 100 cc Isovue-370. Sagittal and coronal reconstructions were performed.  Automated exposure control and iterative reconstruction   methods were used.        Findings:  LUNG BASES: There is left basal atelectasis.    LIVER:  Unremarkable parenchyma without focal lesion.  BILIARY/GALLBLADDER: Cholecystectomy  SPLEEN:  Unremarkable  PANCREAS:  Unremarkable  ADRENAL:  Unremarkable  KIDNEYS:  Unremarkable parenchyma with no solid mass identified. There is mild bilateral hydroureteronephrosis, left more so than right. This is presumably related to bladder distention. No calculus  identified.  GASTROINTESTINAL/MESENTERY:  No evidence of obstruction nor inflammation.    MESENTERIC VESSELS:  Patent.  AORTA/IVC:  Normal caliber.    RETROPERITONEUM/LYMPH NODES:  Unremarkable    REPRODUCTIVE:  Unremarkable  BLADDER: There is urinary bladder distention. Correlate for history of bladder outlet obstruction or neurogenic bladder. There is a 5.9 cm left posterior lateral diverticulum.    OSSEUS STRUCTURES:  Typical for age with no acute process identified.        Impression:      Impression:  1.Urinary bladder distention. Correlate for history of bladder outlet obstruction or neurogenic bladder. Resultant mild bilateral hydroureteronephrosis, left greater than right. There is a potentially associated 5.9 cm left posterior bladder   diverticulum.  2.Other incidental nonemergent findings as detailed above.            Electronically Signed: Santiago Montenegro MD    2/26/2024 6:10 PM EST    Workstation ID: [id number]            ASSESSMENT:  -Generalized abdominal pain  -Nausea  -Incomplete evacuation/constipation  -Dyspepsia  -Dysphagia  -Urinary retention  -Anxiety/depression  -Hypertension  -Hyperlipidemia  -History of appendectomy  -History of hysterectomy  -History of cholecystectomy    PLAN:  Patient is a 68-year-old female with history of appendectomy, hysterectomy, and cholecystectomy who presented on 2/26 with complaints of abdominal pain and urinary retention.    CT abdomen/pelvis W on admission showed urinary bladder distention with resultant mild bilateral hydroureteronephrosis.  Urology was consulted and suspect that urinary retention could be secondary to constipation.  Patient reports multiple bowel movements in the past couple days following laxative administration.  Will plan to start daily bowel regimen of Movantik as patient does take oxycodone at home.  Check KUB to assess stool burden.  Diet as tolerated.  Continue PPI twice daily.  Could consider outpatient EGD for persistent  dysphagia.  Supportive care.      I discussed the patients findings and my recommendations with the patient.  I will discuss case with Dr. Farias and change plan accordingly.    We appreciate the referral.    Electronically signed by ZULEYMA Wilkes, 02/28/24, 1:56 PM EST.

## 2024-02-29 ENCOUNTER — APPOINTMENT (OUTPATIENT)
Dept: MRI IMAGING | Facility: HOSPITAL | Age: 69
DRG: 696 | End: 2024-02-29
Payer: MEDICARE

## 2024-02-29 ENCOUNTER — INPATIENT HOSPITAL (OUTPATIENT)
Dept: URBAN - METROPOLITAN AREA HOSPITAL 84 | Facility: HOSPITAL | Age: 69
End: 2024-02-29
Payer: MEDICARE

## 2024-02-29 DIAGNOSIS — K59.00 CONSTIPATION, UNSPECIFIED: ICD-10-CM

## 2024-02-29 DIAGNOSIS — R10.84 GENERALIZED ABDOMINAL PAIN: ICD-10-CM

## 2024-02-29 DIAGNOSIS — R13.10 DYSPHAGIA, UNSPECIFIED: ICD-10-CM

## 2024-02-29 DIAGNOSIS — R11.0 NAUSEA: ICD-10-CM

## 2024-02-29 LAB
ALBUMIN SERPL-MCNC: 3.9 G/DL (ref 3.5–5.2)
ALBUMIN/GLOB SERPL: 1.9 G/DL
ALP SERPL-CCNC: 86 U/L (ref 39–117)
ALT SERPL W P-5'-P-CCNC: 46 U/L (ref 1–33)
ANION GAP SERPL CALCULATED.3IONS-SCNC: 9 MMOL/L (ref 5–15)
AST SERPL-CCNC: 32 U/L (ref 1–32)
BASOPHILS # BLD AUTO: 0 10*3/MM3 (ref 0–0.2)
BASOPHILS NFR BLD AUTO: 0.6 % (ref 0–1.5)
BILIRUB SERPL-MCNC: 0.6 MG/DL (ref 0–1.2)
BUN SERPL-MCNC: 14 MG/DL (ref 8–23)
BUN/CREAT SERPL: 17.3 (ref 7–25)
CALCIUM SPEC-SCNC: 9 MG/DL (ref 8.6–10.5)
CHLORIDE SERPL-SCNC: 104 MMOL/L (ref 98–107)
CO2 SERPL-SCNC: 28 MMOL/L (ref 22–29)
CREAT SERPL-MCNC: 0.81 MG/DL (ref 0.57–1)
DEPRECATED RDW RBC AUTO: 43.3 FL (ref 37–54)
EGFRCR SERPLBLD CKD-EPI 2021: 79.7 ML/MIN/1.73
EOSINOPHIL # BLD AUTO: 0.1 10*3/MM3 (ref 0–0.4)
EOSINOPHIL NFR BLD AUTO: 1.9 % (ref 0.3–6.2)
ERYTHROCYTE [DISTWIDTH] IN BLOOD BY AUTOMATED COUNT: 13.6 % (ref 12.3–15.4)
GLOBULIN UR ELPH-MCNC: 2.1 GM/DL
GLUCOSE SERPL-MCNC: 118 MG/DL (ref 65–99)
HCT VFR BLD AUTO: 36.6 % (ref 34–46.6)
HGB BLD-MCNC: 12.3 G/DL (ref 12–15.9)
LYMPHOCYTES # BLD AUTO: 1.8 10*3/MM3 (ref 0.7–3.1)
LYMPHOCYTES NFR BLD AUTO: 27.5 % (ref 19.6–45.3)
MCH RBC QN AUTO: 29.5 PG (ref 26.6–33)
MCHC RBC AUTO-ENTMCNC: 33.7 G/DL (ref 31.5–35.7)
MCV RBC AUTO: 87.6 FL (ref 79–97)
MONOCYTES # BLD AUTO: 0.4 10*3/MM3 (ref 0.1–0.9)
MONOCYTES NFR BLD AUTO: 5.3 % (ref 5–12)
NEUTROPHILS NFR BLD AUTO: 4.3 10*3/MM3 (ref 1.7–7)
NEUTROPHILS NFR BLD AUTO: 64.7 % (ref 42.7–76)
NRBC BLD AUTO-RTO: 0 /100 WBC (ref 0–0.2)
PLATELET # BLD AUTO: 119 10*3/MM3 (ref 140–450)
PMV BLD AUTO: 10 FL (ref 6–12)
POTASSIUM SERPL-SCNC: 3.6 MMOL/L (ref 3.5–5.2)
PROT SERPL-MCNC: 6 G/DL (ref 6–8.5)
RBC # BLD AUTO: 4.18 10*6/MM3 (ref 3.77–5.28)
SODIUM SERPL-SCNC: 141 MMOL/L (ref 136–145)
WBC NRBC COR # BLD AUTO: 6.7 10*3/MM3 (ref 3.4–10.8)

## 2024-02-29 PROCEDURE — 94799 UNLISTED PULMONARY SVC/PX: CPT

## 2024-02-29 PROCEDURE — 99232 SBSQ HOSP IP/OBS MODERATE 35: CPT | Performed by: NURSE PRACTITIONER

## 2024-02-29 PROCEDURE — 25010000002 MIDAZOLAM PER 1 MG: Performed by: INTERNAL MEDICINE

## 2024-02-29 PROCEDURE — 80053 COMPREHEN METABOLIC PANEL: CPT | Performed by: NURSE PRACTITIONER

## 2024-02-29 PROCEDURE — 25810000003 SODIUM CHLORIDE 0.9 % SOLUTION: Performed by: NURSE PRACTITIONER

## 2024-02-29 PROCEDURE — 94761 N-INVAS EAR/PLS OXIMETRY MLT: CPT

## 2024-02-29 PROCEDURE — 72148 MRI LUMBAR SPINE W/O DYE: CPT

## 2024-02-29 PROCEDURE — 85025 COMPLETE CBC W/AUTO DIFF WBC: CPT | Performed by: NURSE PRACTITIONER

## 2024-02-29 PROCEDURE — 94664 DEMO&/EVAL PT USE INHALER: CPT

## 2024-02-29 RX ORDER — LUBIPROSTONE 24 UG/1
24 CAPSULE ORAL 2 TIMES DAILY WITH MEALS
Status: DISCONTINUED | OUTPATIENT
Start: 2024-02-29 | End: 2024-03-01 | Stop reason: HOSPADM

## 2024-02-29 RX ORDER — MIDAZOLAM HYDROCHLORIDE 1 MG/ML
1 INJECTION INTRAMUSCULAR; INTRAVENOUS ONCE
Status: COMPLETED | OUTPATIENT
Start: 2024-02-29 | End: 2024-02-29

## 2024-02-29 RX ORDER — SORBITOL SOLUTION 70 %
50 SOLUTION, ORAL MISCELLANEOUS ONCE
Status: COMPLETED | OUTPATIENT
Start: 2024-02-29 | End: 2024-02-29

## 2024-02-29 RX ADMIN — CLONIDINE HYDROCHLORIDE 0.2 MG: 0.1 TABLET ORAL at 22:32

## 2024-02-29 RX ADMIN — ATORVASTATIN CALCIUM 40 MG: 40 TABLET, FILM COATED ORAL at 09:48

## 2024-02-29 RX ADMIN — PANTOPRAZOLE SODIUM 40 MG: 40 TABLET, DELAYED RELEASE ORAL at 18:10

## 2024-02-29 RX ADMIN — OXYCODONE 10 MG: 5 TABLET ORAL at 00:04

## 2024-02-29 RX ADMIN — SODIUM CHLORIDE 75 ML/HR: 9 INJECTION, SOLUTION INTRAVENOUS at 03:49

## 2024-02-29 RX ADMIN — HYDRALAZINE HYDROCHLORIDE 25 MG: 25 TABLET ORAL at 22:31

## 2024-02-29 RX ADMIN — LUBIPROSTONE 24 MCG: 24 CAPSULE, GELATIN COATED ORAL at 18:11

## 2024-02-29 RX ADMIN — OXYCODONE 10 MG: 5 TABLET ORAL at 11:02

## 2024-02-29 RX ADMIN — Medication 10 ML: at 21:24

## 2024-02-29 RX ADMIN — OXYCODONE 10 MG: 5 TABLET ORAL at 18:11

## 2024-02-29 RX ADMIN — OXYCODONE 10 MG: 5 TABLET ORAL at 22:29

## 2024-02-29 RX ADMIN — LOSARTAN POTASSIUM 100 MG: 50 TABLET, FILM COATED ORAL at 09:48

## 2024-02-29 RX ADMIN — PANTOPRAZOLE SODIUM 40 MG: 40 TABLET, DELAYED RELEASE ORAL at 09:48

## 2024-02-29 RX ADMIN — DOCUSATE SODIUM 50MG AND SENNOSIDES 8.6MG 2 TABLET: 8.6; 5 TABLET, FILM COATED ORAL at 09:47

## 2024-02-29 RX ADMIN — SORBITOL SOLUTION (BULK) 50 ML: 70 SOLUTION at 09:47

## 2024-02-29 RX ADMIN — LIDOCAINE 1 PATCH: 4 PATCH TOPICAL at 09:47

## 2024-02-29 RX ADMIN — HYDROCHLOROTHIAZIDE 25 MG: 25 TABLET ORAL at 09:48

## 2024-02-29 RX ADMIN — CLONIDINE HYDROCHLORIDE 0.2 MG: 0.1 TABLET ORAL at 09:48

## 2024-02-29 RX ADMIN — MIDAZOLAM 1 MG: 1 INJECTION INTRAMUSCULAR; INTRAVENOUS at 16:09

## 2024-02-29 RX ADMIN — LUBIPROSTONE 24 MCG: 24 CAPSULE, GELATIN COATED ORAL at 11:02

## 2024-02-29 RX ADMIN — HYDRALAZINE HYDROCHLORIDE 25 MG: 25 TABLET ORAL at 18:10

## 2024-02-29 RX ADMIN — SERTRALINE 150 MG: 100 TABLET, FILM COATED ORAL at 09:47

## 2024-02-29 RX ADMIN — ALBUTEROL SULFATE 2.5 MG: 2.5 SOLUTION RESPIRATORY (INHALATION) at 10:30

## 2024-02-29 RX ADMIN — OXYCODONE 10 MG: 5 TABLET ORAL at 04:58

## 2024-02-29 RX ADMIN — Medication 10 ML: at 09:49

## 2024-02-29 RX ADMIN — HYDRALAZINE HYDROCHLORIDE 25 MG: 25 TABLET ORAL at 09:48

## 2024-02-29 RX ADMIN — NALOXEGOL OXALATE 25 MG: 25 TABLET, FILM COATED ORAL at 09:48

## 2024-02-29 NOTE — PROGRESS NOTES
[facility] MEDICINE SERVICE  DAILY PROGRESS NOTE    NAME: oMna hSeffield  : 1955  MRN: [id number]      LOS: 1 day     PROVIDER OF SERVICE: Erasmo Izquierdo MD    Chief Complaint: Urinary retention    Subjective:     Interval History:  History taken from: patient    History of Present Illness:   68-year-old female with multiple abdominal surgeries was admitted with abdominal pain and difficulty controlling her bowels and bladder.  She states that she cannot empty fully and sometimes when she stands up she just urinates on herself.She proceeded to the ED yesterday due to difficulty controlling bladder and bowel, especially her bladder.Pt followed by urology and recommends voiding trail in a few days.  GI consulted for constipation.    2024 patient seen and examined in bed no acute distress, complaining of urinary incontinence.  Complaints of back pain.  Will obtain MRI of the back.  discussed with RN.    Review of Systems  Constitutional: Negative for fever.   Gastrointestinal:  Positive for bloating, abdominal pain, bowel incontinence and constipation. Negative for nausea.   Genitourinary:  Positive for bladder incontinence, flank pain, incomplete emptying and pelvic pain.   All other systems reviewed and are negative  Objective:     Vital Signs  Temp:  [97.2 °F (36.2 °C)-97.3 °F (36.3 °C)] 97.3 °F (36.3 °C)  Heart Rate:  [45-64] 64  Resp:  [12-17] 12  BP: (107-168)/(59-90) 117/67   Body mass index is 28.62 kg/m².    Physical Exam    Appearance: Normal appearance.   HENT:      Head: Normocephalic and atraumatic.      Right Ear: External ear normal.      Left Ear: External ear normal.      Nose: Nose normal.      Mouth/Throat:      Mouth: Mucous membranes are moist.      Pharynx: Oropharynx is clear.   Eyes:      Extraocular Movements: Extraocular movements intact.   Cardiovascular:      Rate and Rhythm: Normal rate and regular rhythm.      Pulses: Normal pulses.      Heart sounds: Normal  heart sounds.   Pulmonary:      Effort: Pulmonary effort is normal.      Breath sounds: Normal breath sounds.   Abdominal:      General: Abdomen is flat. Bowel sounds are normal.      Palpations: Abdomen is soft.      Tenderness: There is abdominal tenderness.      Comments: Scar noted on abdomen, midline.    Musculoskeletal:         General: Normal range of motion.      Cervical back: Normal range of motion.   Skin:     General: Skin is warm.   Neurological:      General: No focal deficit present.      Mental Status: She is alert and oriented to person, place, and time.   Psychiatric:         Mood and Affect: Mood normal.         Behavior: Behavior normal.   Scheduled Meds   atorvastatin, 40 mg, Oral, Daily  cloNIDine, 0.2 mg, Oral, BID  hydrALAZINE, 25 mg, Oral, TID  losartan, 100 mg, Oral, Q24H   And  hydroCHLOROthiazide, 25 mg, Oral, Q24H  Lidocaine, 1 patch, Transdermal, Q24H  lubiprostone, 24 mcg, Oral, BID With Meals  midazolam, 1 mg, Intravenous, Once  Naloxegol Oxalate, 25 mg, Oral, QAM  pantoprazole, 40 mg, Oral, BID AC  senna-docusate sodium, 2 tablet, Oral, BID  sertraline, 150 mg, Oral, Daily  sodium chloride, 10 mL, Intravenous, Q12H       PRN Meds     acetaminophen    albuterol    senna-docusate sodium **AND** polyethylene glycol **AND** bisacodyl **AND** bisacodyl    melatonin    nitroglycerin    ondansetron    oxyCODONE    [COMPLETED] Insert Peripheral IV **AND** sodium chloride    sodium chloride    sodium chloride   Infusions         Diagnostic Data    Results from last 7 days   Lab Units 02/29/24  0455   WBC 10*3/mm3 6.70   HEMOGLOBIN g/dL 12.3   HEMATOCRIT % 36.6   PLATELETS 10*3/mm3 119*   GLUCOSE mg/dL 118*   CREATININE mg/dL 0.81   BUN mg/dL 14   SODIUM mmol/L 141   POTASSIUM mmol/L 3.6   AST (SGOT) U/L 32   ALT (SGPT) U/L 46*   ALK PHOS U/L 86   BILIRUBIN mg/dL 0.6   ANION GAP mmol/L 9.0       XR Abdomen KUB    Result Date: 2/28/2024  Impression: Moderate stool burden in the descending  colon. Electronically Signed: Marcie Cramer MD  2/28/2024 4:15 PM EST  Workstation ID: [id number]    US Renal Bilateral    Result Date: 2/28/2024  1. No hydronephrosis. 2. Small right renal cyst. Previously documented left renal cyst on CT abdomen are not visualized on today's ultrasound. 3. No renal stone is identified. Electronically Signed: Marcie Cramer MD  2/28/2024 10:16 AM EST  Workstation ID: [id number]       I reviewed the patient's new clinical results.    Assessment/Plan:     Active and Resolved Problems  Active Hospital Problems    Diagnosis  POA    **Urinary retention [R33.9]  Yes      Resolved Hospital Problems   No resolved problems to display.       Urinary retention  -CMP showed BUN 29 and creatinine 1.08 but otherwise unremarkable  -CBC and lipase unremarkable  -UA negative for nitrites, blood, bacteria  -CT abdomen pelvis showed urinary bladder distention and mild bilateral hydroureteronephrosis, left greater than right  -In the ED patient given Norco and iopamidol  -Jackson catheter inserted in the ED and per ED's notes patient had 2000 urinary output shortly after insertion  -Urology consulted and recommends continued jackson and voiding trial in a few days  - possible cystoscopy in am  - Renal u/s pending  -Oxycodone ordered as needed  -Gentle hydration     Constipation  - ct showed constipation  - gi consulted     Hypertension-Moderately controlled   - Continue clonidine, and hydralazine, and Hyzaar  -Atenolol on hold due to bradycardia  - Monitor while admitted      Hyperlipidemia  -Continue Lipitor     Depression/anxiety  -Continue Zoloft     Overweight-BMI 28.62  -Lifestyle modifications     DVT prophylaxis:  Mechanical DVT prophylaxis orders are present.      Code status is   Code Status and Medical Interventions:   Ordered at: 02/27/24 0025     Level Of Support Discussed With:    Patient     Code Status (Patient has no pulse and is not breathing):    CPR (Attempt to Resuscitate)     Medical  Interventions (Patient has pulse or is breathing):    Full Support       Plan for disposition:home in 2 days    Time: 30 minutes    Signature: Electronically signed by Erasmo Izquierdo MD, 02/29/24, 15:09 EST.  [facility] Hospitalist Team

## 2024-02-29 NOTE — PROGRESS NOTES
LOS: 1 day   Patient Care Team:  Teresa Fernandez APRN as PCP - General (Nurse Practitioner)  Thomas Roa MD as Consulting Physician (Cardiology)  Barby Hopper MD as Consulting Physician (Cardiology)      Subjective     Interval History:     Subjective: Patient reports persistent abdominal pain.  No nausea/vomiting.  KUB continues to show moderate stool burden.      ROS:   No chest pain, shortness of breath, or cough.        Medication Review:     Current Facility-Administered Medications:     acetaminophen (TYLENOL) tablet 650 mg, 650 mg, Oral, Q4H PRN, Laura Rivera APRN    albuterol (PROVENTIL) nebulizer solution 0.083% 2.5 mg/3mL, 2.5 mg, Nebulization, Q4H PRN, Laura Rivera APRN, 2.5 mg at 02/28/24 2101    atorvastatin (LIPITOR) tablet 40 mg, 40 mg, Oral, Daily, Stephanie Murry APRN, 40 mg at 02/29/24 0948    sennosides-docusate (PERICOLACE) 8.6-50 MG per tablet 2 tablet, 2 tablet, Oral, BID, 2 tablet at 02/29/24 0947 **AND** polyethylene glycol (MIRALAX) packet 17 g, 17 g, Oral, Daily PRN **AND** bisacodyl (DULCOLAX) EC tablet 5 mg, 5 mg, Oral, Daily PRN, 5 mg at 02/27/24 0913 **AND** bisacodyl (DULCOLAX) suppository 10 mg, 10 mg, Rectal, Daily PRN, Laura Rivera APRN    cloNIDine (CATAPRES) tablet 0.2 mg, 0.2 mg, Oral, BID, Laura Rivera APRN, 0.2 mg at 02/29/24 0948    hydrALAZINE (APRESOLINE) tablet 25 mg, 25 mg, Oral, TID, Laura Rivera APRN, 25 mg at 02/29/24 0948    losartan (COZAAR) tablet 100 mg, 100 mg, Oral, Q24H, 100 mg at 02/29/24 0948 **AND** hydroCHLOROthiazide tablet 25 mg, 25 mg, Oral, Q24H, Stephanie Murry APRN, 25 mg at 02/29/24 0948    Lidocaine 4 % 1 patch, 1 patch, Transdermal, Q24H, Laura Rivera APRN, 1 patch at 02/29/24 0947    lubiprostone (AMITIZA) capsule 24 mcg, 24 mcg, Oral, BID With Meals, Erasmo Izquierdo MD    melatonin tablet 5 mg, 5 mg, Oral, Nightly PRN, Laura Rivera APRN, 5 mg at 02/27/24  0051    Naloxegol Oxalate (MOVANTIK) tablet 25 mg, 25 mg, Oral, QAM, Claire Hughes D, APRN, 25 mg at 02/29/24 0948    nitroglycerin (NITROSTAT) SL tablet 0.4 mg, 0.4 mg, Sublingual, Q5 Min PRN, Alexsander Murrya S, APRN    ondansetron (ZOFRAN) injection 4 mg, 4 mg, Intravenous, Q6H PRN, Laura Rivera APRN    oxyCODONE (ROXICODONE) immediate release tablet 10 mg, 10 mg, Oral, Q4H PRN, Laura Rivera, APRN, 10 mg at 02/29/24 0458    pantoprazole (PROTONIX) EC tablet 40 mg, 40 mg, Oral, BID AC, TripJuancarlos contenanda S, APRN, 40 mg at 02/29/24 0948    sertraline (ZOLOFT) tablet 150 mg, 150 mg, Oral, Daily, Laura Rivera APRN, 150 mg at 02/29/24 0947    [COMPLETED] Insert Peripheral IV, , , Once **AND** sodium chloride 0.9 % flush 10 mL, 10 mL, Intravenous, PRN, Teresa Parsons APRN    sodium chloride 0.9 % flush 10 mL, 10 mL, Intravenous, Q12H, Laura Rivera APRN, 10 mL at 02/29/24 0949    sodium chloride 0.9 % flush 10 mL, 10 mL, Intravenous, PRN, Laura Rivera, APRTILA    sodium chloride 0.9 % infusion 40 mL, 40 mL, Intravenous, PRN, Laura Rivera, APRN      Objective     Vital Signs  Vitals:    02/28/24 2101 02/28/24 2105 02/28/24 2215 02/29/24 0546   BP:   107/90 168/59   BP Location:   Right arm Right arm   Patient Position:   Sitting Lying   Pulse: 54 52 55 52   Resp: 15 17 16 14   Temp:   97.2 °F (36.2 °C) 97.3 °F (36.3 °C)   TempSrc:   Temporal Temporal   SpO2: 93% 94% 90% 93%   Weight:       Height:           Physical Exam:     General Appearance:    Awake and alert, in no acute distress   Head:    Normocephalic, without obvious abnormality   Eyes:          Conjunctivae normal, anicteric sclera   Throat:   No oral lesions, no thrush, oral mucosa moist   Neck:   No adenopathy, supple, no JVD   Lungs:     respirations regular, even and unlabored   Abdomen:     Soft, generalized tenderness, no rebound or guarding, non-distended   Rectal:     Deferred   Extremities:   No edema,  no cyanosis   Skin:   No bruising or rash, no jaundice        Results Review:    CBC    Results from last 7 days   Lab Units 02/29/24  0455 02/28/24  0348 02/27/24  0441 02/26/24  1659   WBC 10*3/mm3 6.70 7.90 9.00 9.10   HEMOGLOBIN g/dL 12.3 12.3 12.6 13.7   PLATELETS 10*3/mm3 119* 129* 140 166     CMP   Results from last 7 days   Lab Units 02/29/24  0455 02/28/24  0348 02/27/24  0441 02/26/24  1659   SODIUM mmol/L 141 141 142 141   POTASSIUM mmol/L 3.6 3.8 3.5 3.8   CHLORIDE mmol/L 104 102 103 102   CO2 mmol/L 28.0 30.0* 28.0 27.0   BUN mg/dL 14 21 29* 22   CREATININE mg/dL 0.81 0.97 1.08* 1.08*   GLUCOSE mg/dL 118* 106* 98 96   ALBUMIN g/dL 3.9  --   --  4.3   BILIRUBIN mg/dL 0.6  --   --  0.5   ALK PHOS U/L 86  --   --  63   AST (SGOT) U/L 32  --   --  24   ALT (SGPT) U/L 46*  --   --  20   LIPASE U/L  --   --   --  45     Cr Clearance Estimated Creatinine Clearance: 72 mL/min (by C-G formula based on SCr of 0.81 mg/dL).  Coag     HbA1C   Lab Results   Component Value Date    HGBA1C 6.20 (H) 07/11/2023    HGBA1C 6.0 (H) 02/14/2023         Infection     UA    Results from last 7 days   Lab Units 02/26/24  1704   NITRITE UA  Negative   WBC UA /HPF 0-2   BACTERIA UA /HPF None Seen   SQUAM EPITHEL UA /HPF 0-2     Microbiology Results (last 10 days)       ** No results found for the last 240 hours. **          Imaging Results (Last 72 Hours)       Procedure Component Value Units Date/Time    XR Abdomen KUB [[id number]] Collected: 02/28/24 1614     Updated: 02/28/24 1617    Narrative:      XR ABDOMEN KUB    Date of Exam: 2/28/2024 3:28 PM EST    Indication: assess stool burden    Comparison: CT abdomen pelvis 2/26/2024    Findings:  Moderate stool burden is suggested within the descending colon. No abnormally dilated small bowel loops are identified. No pneumatosis or gross free intraperitoneal air is evident. Spinal stimulator device is present. Cholecystectomy changes are noted.   Lung bases are clear. Mild  degenerative spurring in the spine.      Impression:      Impression:  Moderate stool burden in the descending colon.      Electronically Signed: Marcie Cramer MD    2/28/2024 4:15 PM EST    Workstation ID: [id number]    US Renal Bilateral [[id number]] Collected: 02/28/24 1012     Updated: 02/28/24 1018    Narrative:      US RENAL BILATERAL    Date of Exam: 2/28/2024 9:41 AM EST    Indication: hydronephrosis.    Comparison: CT abdomen pelvis 2/26/2024.    Technique: Grayscale and color Doppler ultrasound evaluation of the kidneys and urinary bladder was performed.      Findings:  The right kidney measures 8.7 x 4.6 x 5.4 cm. Right kidney maintains normal cortical thickness and cortical echotexture. The left kidney measures 9.1 x 4.8 x 5.2 cm, it maintains normal cortical thickness and echotexture. A cyst at the right upper renal   pole measures 9 mm. No cystic or solid left renal abnormality is identified. No shadowing echogenic renal stone is identified on either side. There is no right or left hydronephrosis. Cyst seen within the left kidney on prior CT are not identified on   today's examination. Urinary bladder is not visualized, decompressed by Owen catheter          Impression:      1. No hydronephrosis.  2. Small right renal cyst. Previously documented left renal cyst on CT abdomen are not visualized on today's ultrasound.  3. No renal stone is identified.        Electronically Signed: Marcie Cramer MD    2/28/2024 10:16 AM EST    Workstation ID: [id number]    CT Abdomen Pelvis With Contrast [[id number]] Collected: 02/26/24 1806     Updated: 02/26/24 1812    Narrative:      CT ABDOMEN PELVIS W CONTRAST    Date of Exam: 2/26/2024 5:36 PM EST    Indication: abd pain.    Comparison: None available.    Technique: Axial CT images were obtained of the abdomen and pelvis following the uneventful intravenous administration of 100 cc Isovue-370. Sagittal and coronal reconstructions were performed.  Automated exposure  control and iterative reconstruction   methods were used.        Findings:  LUNG BASES: There is left basal atelectasis.    LIVER:  Unremarkable parenchyma without focal lesion.  BILIARY/GALLBLADDER: Cholecystectomy  SPLEEN:  Unremarkable  PANCREAS:  Unremarkable  ADRENAL:  Unremarkable  KIDNEYS:  Unremarkable parenchyma with no solid mass identified. There is mild bilateral hydroureteronephrosis, left more so than right. This is presumably related to bladder distention. No calculus identified.  GASTROINTESTINAL/MESENTERY:  No evidence of obstruction nor inflammation.    MESENTERIC VESSELS:  Patent.  AORTA/IVC:  Normal caliber.    RETROPERITONEUM/LYMPH NODES:  Unremarkable    REPRODUCTIVE:  Unremarkable  BLADDER: There is urinary bladder distention. Correlate for history of bladder outlet obstruction or neurogenic bladder. There is a 5.9 cm left posterior lateral diverticulum.    OSSEUS STRUCTURES:  Typical for age with no acute process identified.        Impression:      Impression:  1.Urinary bladder distention. Correlate for history of bladder outlet obstruction or neurogenic bladder. Resultant mild bilateral hydroureteronephrosis, left greater than right. There is a potentially associated 5.9 cm left posterior bladder   diverticulum.  2.Other incidental nonemergent findings as detailed above.            Electronically Signed: Santiago Montenegro MD    2/26/2024 6:10 PM EST    Workstation ID: [id number]            Assessment & Plan     ASSESSMENT:  -Generalized abdominal pain  -Nausea  -Incomplete evacuation/constipation  -Dyspepsia  -Dysphagia  -Urinary retention  -Anxiety/depression  -Hypertension  -Hyperlipidemia  -History of appendectomy  -History of hysterectomy  -History of cholecystectomy     PLAN:  Patient is a 68-year-old female with history of appendectomy, hysterectomy, and cholecystectomy who presented on 2/26 with complaints of abdominal pain and urinary retention. CT abdomen/pelvis W on admission showed  urinary bladder distention with resultant mild bilateral hydroureteronephrosis.     KUB continues to show moderate stool burden.  Will plan a dose of sorbitol for bowel purge.  Continue Movantik daily.  Diet as tolerated.  Continue PPI twice daily.  Could consider outpatient EGD for persistent dysphagia.   Continue supportive care.      Electronically signed by ZULEYMA Wilkes, 02/29/24, 10:29 AM EST.

## 2024-02-29 NOTE — PLAN OF CARE
Goal Outcome Evaluation:  Plan of Care Reviewed With: patient        Progress: improving  Outcome Evaluation: Pt rested throughout the night with complaints of abd pain well controlled with PO pain medications VSS NSR has F/C may possibly try a voiding trial today will await further orders from MD

## 2024-03-01 ENCOUNTER — INPATIENT HOSPITAL (OUTPATIENT)
Dept: URBAN - METROPOLITAN AREA HOSPITAL 84 | Facility: HOSPITAL | Age: 69
End: 2024-03-01
Payer: MEDICARE

## 2024-03-01 ENCOUNTER — READMISSION MANAGEMENT (OUTPATIENT)
Dept: CALL CENTER | Facility: HOSPITAL | Age: 69
End: 2024-03-01
Payer: MEDICARE

## 2024-03-01 VITALS
BODY MASS INDEX: 28.49 KG/M2 | OXYGEN SATURATION: 98 % | TEMPERATURE: 98.5 F | WEIGHT: 177.25 LBS | HEIGHT: 66 IN | SYSTOLIC BLOOD PRESSURE: 107 MMHG | HEART RATE: 51 BPM | DIASTOLIC BLOOD PRESSURE: 54 MMHG | RESPIRATION RATE: 16 BRPM

## 2024-03-01 DIAGNOSIS — R10.84 GENERALIZED ABDOMINAL PAIN: ICD-10-CM

## 2024-03-01 LAB
ALBUMIN SERPL-MCNC: 3.7 G/DL (ref 3.5–5.2)
ALBUMIN/GLOB SERPL: 1.8 G/DL
ALP SERPL-CCNC: 75 U/L (ref 39–117)
ALT SERPL W P-5'-P-CCNC: 33 U/L (ref 1–33)
ANION GAP SERPL CALCULATED.3IONS-SCNC: 10 MMOL/L (ref 5–15)
AST SERPL-CCNC: 22 U/L (ref 1–32)
BASOPHILS # BLD AUTO: 0 10*3/MM3 (ref 0–0.2)
BASOPHILS NFR BLD AUTO: 0.6 % (ref 0–1.5)
BILIRUB SERPL-MCNC: 0.5 MG/DL (ref 0–1.2)
BUN SERPL-MCNC: 14 MG/DL (ref 8–23)
BUN/CREAT SERPL: 15.7 (ref 7–25)
CALCIUM SPEC-SCNC: 9 MG/DL (ref 8.6–10.5)
CHLORIDE SERPL-SCNC: 103 MMOL/L (ref 98–107)
CO2 SERPL-SCNC: 28 MMOL/L (ref 22–29)
CREAT SERPL-MCNC: 0.89 MG/DL (ref 0.57–1)
DEPRECATED RDW RBC AUTO: 43.8 FL (ref 37–54)
EGFRCR SERPLBLD CKD-EPI 2021: 71.2 ML/MIN/1.73
EOSINOPHIL # BLD AUTO: 0.1 10*3/MM3 (ref 0–0.4)
EOSINOPHIL NFR BLD AUTO: 2 % (ref 0.3–6.2)
ERYTHROCYTE [DISTWIDTH] IN BLOOD BY AUTOMATED COUNT: 13.6 % (ref 12.3–15.4)
GLOBULIN UR ELPH-MCNC: 2.1 GM/DL
GLUCOSE SERPL-MCNC: 111 MG/DL (ref 65–99)
HCT VFR BLD AUTO: 36.4 % (ref 34–46.6)
HGB BLD-MCNC: 12.1 G/DL (ref 12–15.9)
LYMPHOCYTES # BLD AUTO: 1.9 10*3/MM3 (ref 0.7–3.1)
LYMPHOCYTES NFR BLD AUTO: 27.1 % (ref 19.6–45.3)
MCH RBC QN AUTO: 29.2 PG (ref 26.6–33)
MCHC RBC AUTO-ENTMCNC: 33.2 G/DL (ref 31.5–35.7)
MCV RBC AUTO: 88.1 FL (ref 79–97)
MONOCYTES # BLD AUTO: 0.4 10*3/MM3 (ref 0.1–0.9)
MONOCYTES NFR BLD AUTO: 6.2 % (ref 5–12)
NEUTROPHILS NFR BLD AUTO: 4.4 10*3/MM3 (ref 1.7–7)
NEUTROPHILS NFR BLD AUTO: 64.1 % (ref 42.7–76)
NRBC BLD AUTO-RTO: 0 /100 WBC (ref 0–0.2)
PLATELET # BLD AUTO: 113 10*3/MM3 (ref 140–450)
PMV BLD AUTO: 9.8 FL (ref 6–12)
POTASSIUM SERPL-SCNC: 3.4 MMOL/L (ref 3.5–5.2)
PROT SERPL-MCNC: 5.8 G/DL (ref 6–8.5)
RBC # BLD AUTO: 4.13 10*6/MM3 (ref 3.77–5.28)
SODIUM SERPL-SCNC: 141 MMOL/L (ref 136–145)
WBC NRBC COR # BLD AUTO: 6.9 10*3/MM3 (ref 3.4–10.8)

## 2024-03-01 PROCEDURE — 99232 SBSQ HOSP IP/OBS MODERATE 35: CPT | Performed by: NURSE PRACTITIONER

## 2024-03-01 PROCEDURE — 80053 COMPREHEN METABOLIC PANEL: CPT | Performed by: NURSE PRACTITIONER

## 2024-03-01 PROCEDURE — 85025 COMPLETE CBC W/AUTO DIFF WBC: CPT | Performed by: NURSE PRACTITIONER

## 2024-03-01 PROCEDURE — 0TJB8ZZ INSPECTION OF BLADDER, VIA NATURAL OR ARTIFICIAL OPENING ENDOSCOPIC: ICD-10-PCS | Performed by: UROLOGY

## 2024-03-01 RX ORDER — LUBIPROSTONE 24 UG/1
24 CAPSULE ORAL 2 TIMES DAILY WITH MEALS
Qty: 30 CAPSULE | Refills: 0 | Status: SHIPPED | OUTPATIENT
Start: 2024-03-01

## 2024-03-01 RX ORDER — POLYETHYLENE GLYCOL 3350 17 G/17G
17 POWDER, FOR SOLUTION ORAL DAILY PRN
Qty: 510 G | Refills: 0 | Status: SHIPPED | OUTPATIENT
Start: 2024-03-01

## 2024-03-01 RX ORDER — CLONIDINE HYDROCHLORIDE 0.2 MG/1
0.1 TABLET ORAL 2 TIMES DAILY
Qty: 30 TABLET | Refills: 0 | Status: SHIPPED | OUTPATIENT
Start: 2024-03-01 | End: 2024-03-01 | Stop reason: SDUPTHER

## 2024-03-01 RX ORDER — AMOXICILLIN 250 MG
2 CAPSULE ORAL 2 TIMES DAILY
Qty: 30 TABLET | Refills: 0 | Status: SHIPPED | OUTPATIENT
Start: 2024-03-01

## 2024-03-01 RX ORDER — CLONIDINE HYDROCHLORIDE 0.1 MG/1
0.1 TABLET ORAL 2 TIMES DAILY
Qty: 60 TABLET | Refills: 0 | Status: SHIPPED | OUTPATIENT
Start: 2024-03-01

## 2024-03-01 RX ADMIN — HYDRALAZINE HYDROCHLORIDE 25 MG: 25 TABLET ORAL at 09:35

## 2024-03-01 RX ADMIN — OXYCODONE 10 MG: 5 TABLET ORAL at 02:23

## 2024-03-01 RX ADMIN — Medication 10 ML: at 09:36

## 2024-03-01 RX ADMIN — LOSARTAN POTASSIUM 100 MG: 50 TABLET, FILM COATED ORAL at 09:35

## 2024-03-01 RX ADMIN — CLONIDINE HYDROCHLORIDE 0.2 MG: 0.1 TABLET ORAL at 09:34

## 2024-03-01 RX ADMIN — HYDROCHLOROTHIAZIDE 25 MG: 25 TABLET ORAL at 09:35

## 2024-03-01 RX ADMIN — LUBIPROSTONE 24 MCG: 24 CAPSULE, GELATIN COATED ORAL at 09:35

## 2024-03-01 RX ADMIN — NALOXEGOL OXALATE 25 MG: 25 TABLET, FILM COATED ORAL at 09:34

## 2024-03-01 RX ADMIN — PANTOPRAZOLE SODIUM 40 MG: 40 TABLET, DELAYED RELEASE ORAL at 09:35

## 2024-03-01 RX ADMIN — SERTRALINE 150 MG: 100 TABLET, FILM COATED ORAL at 09:35

## 2024-03-01 RX ADMIN — OXYCODONE 10 MG: 5 TABLET ORAL at 06:29

## 2024-03-01 RX ADMIN — ATORVASTATIN CALCIUM 40 MG: 40 TABLET, FILM COATED ORAL at 09:35

## 2024-03-01 RX ADMIN — OXYCODONE 10 MG: 5 TABLET ORAL at 11:16

## 2024-03-01 NOTE — OUTREACH NOTE
Prep Survey      Flowsheet Row Responses   [facility] patient discharged from? [facility]   Is LACE score < 7 ? No   Eligibility [facility]   Date of Admission 02/26/24   Date of Discharge 03/01/24   Discharge Disposition Home or Self Care   Discharge diagnosis Urinary retention   Does the patient have one of the following disease processes/diagnoses(primary or secondary)? Other   Does the patient have Home health ordered? No   Is there a DME ordered? No   Prep survey completed? Yes            Janny PEREIRA - Registered Nurse

## 2024-03-01 NOTE — DISCHARGE SUMMARY
[facility] Medicine Services  Discharge Summary    Date of Service: 3/1/24  Patient Name: Mona Sheffield  : 1955  MRN: [id number]    Date of Admission: 2024  Discharge Diagnosis: Urinary retention  Date of Discharge:  3/1/24  Primary Care Physician: Teresa Fernandez APRN      Presenting Problem:   Urinary retention [R33.9]  Lower abdominal pain [R10.30]  Acute urinary retention [R33.8]    Active and Resolved Hospital Problems:  Active Hospital Problems    Diagnosis POA    **Urinary retention [R33.9] Yes    DDD (degenerative disc disease), lumbar [M51.36] Yes    Vitamin D deficiency [E55.9] Yes    Primary hypertension [I10] Yes    Anxiety and depression [F41.9, F32.A] Yes    Gastroesophageal reflux disease [K21.9] Yes    Chronic obstructive pulmonary disease [J44.9] Yes    Tension headache [G44.209] Yes      Resolved Hospital Problems   No resolved problems to display.       Urinary retention  -CMP showed BUN 29 and creatinine 1.08 but otherwise unremarkable  -CBC and lipase unremarkable  -UA negative for nitrites, blood, bacteria  -CT abdomen pelvis showed urinary bladder distention and mild bilateral hydroureteronephrosis, left greater than right  -In the ED patient given Norco and iopamidol  -Jackson catheter inserted in the ED and per ED's notes patient had 2000 urinary output shortly after insertion  -Urology consulted and recommends continued jackson and voiding trial in a few days  - possible cystoscopy in am  - Renal u/s pending  -Oxycodone ordered as needed  -Gentle hydration     Constipation  - ct showed constipation  - GI consulted     Hypertension-Moderately controlled   - Continue clonidine, and hydralazine, and Hyzaar  -Atenolol on hold due to bradycardia  - Monitor while admitted      Hyperlipidemia  -Continue Lipitor     Depression/anxiety  -Continue Zoloft     Overweight-BMI 28.62  -Lifestyle modifications       Hospital Course     68-year-old female with multiple  abdominal surgeries was admitted with abdominal pain and difficulty controlling her bowels and bladder.  She states that she cannot empty fully and sometimes when she stands up she just urinates on herself.She proceeded to the ED yesterday due to difficulty controlling bladder and bowel, especially her bladder.Pt followed by urology and recommends voiding trail in a few days.  GI consulted for constipation.     2/29/2024 patient seen and examined in bed no acute distress, complaining of urinary incontinence.  Complaints of back pain.  Will obtain MRI of the back.  discussed with RN.  3/1/2024 patient seen and examined in bed no acute distress, vital signs stable, discussed with RN.  Will discharge patient home condition on discharge stable.    DISCHARGE Follow Up Recommendations for labs and diagnostics: Follow-up with PCP in a week.  Follow-up with urology in a week.  Follow-up with GI in 4 to 6 weeks    Reasons For Change In Medications and Indications for New Medications:  START taking:  lubiprostone (AMITIZA)   Naloxegol Oxalate (MOVANTIK)   polyethylene glycol (MIRALAX)   sennosides-docusate (PERICOLACE)    CHANGE how you take:  cloNIDine (CATAPRES) -- how much to take   STOP taking:  atenolol 100 MG tablet (TENORMIN)     Day of Discharge     Vital Signs:  Temp:  [97.9 °F (36.6 °C)-98.5 °F (36.9 °C)] 98.5 °F (36.9 °C)  Heart Rate:  [45-82] 51  Resp:  [12-16] 16  BP: ()/(54-88) 107/54      Physical Exam  Appearance: Normal appearance.   HENT:      Head: Normocephalic and atraumatic.      Right Ear: External ear normal.      Left Ear: External ear normal.      Nose: Nose normal.      Mouth/Throat:      Mouth: Mucous membranes are moist.      Pharynx: Oropharynx is clear.   Eyes:      Extraocular Movements: Extraocular movements intact.   Cardiovascular:      Rate and Rhythm: Normal rate and regular rhythm.      Pulses: Normal pulses.      Heart sounds: Normal heart sounds.   Pulmonary:      Effort:  Pulmonary effort is normal.      Breath sounds: Normal breath sounds.   Abdominal:      General: Abdomen is flat. Bowel sounds are normal.      Palpations: Abdomen is soft.      Tenderness: There is abdominal tenderness.      Comments: Scar noted on abdomen, midline.    Musculoskeletal:         General: Normal range of motion.      Cervical back: Normal range of motion.   Skin:     General: Skin is warm.   Neurological:      General: No focal deficit present.      Mental Status: She is alert and oriented to person, place, and time.   Psychiatric:         Mood and Affect: Mood normal.         Behavior: Behavior normal.   Scheduled Meds              Pertinent  and/or Most Recent Results     LAB RESULTS:      Lab 03/01/24  0514 02/29/24  0455 02/28/24  0348 02/27/24  0441 02/26/24  1659   WBC 6.90 6.70 7.90 9.00 9.10   HEMOGLOBIN 12.1 12.3 12.3 12.6 13.7   HEMATOCRIT 36.4 36.6 37.3 37.3 41.5   PLATELETS 113* 119* 129* 140 166   NEUTROS ABS 4.40 4.30 5.60 5.80 6.20   LYMPHS ABS 1.90 1.80 1.70 2.40 2.20   MONOS ABS 0.40 0.40 0.50 0.60 0.60   EOS ABS 0.10 0.10 0.10 0.20 0.10   MCV 88.1 87.6 88.9 87.8 89.5         Lab 03/01/24  0514 02/29/24 0455 02/28/24 0348 02/27/24  0441 02/26/24  1659   SODIUM 141 141 141 142 141   POTASSIUM 3.4* 3.6 3.8 3.5 3.8   CHLORIDE 103 104 102 103 102   CO2 28.0 28.0 30.0* 28.0 27.0   ANION GAP 10.0 9.0 9.0 11.0 12.0   BUN 14 14 21 29* 22   CREATININE 0.89 0.81 0.97 1.08* 1.08*   EGFR 71.2 79.7 64.2 56.4* 56.4*   GLUCOSE 111* 118* 106* 98 96   CALCIUM 9.0 9.0 9.3 9.4 9.9         Lab 03/01/24  0514 02/29/24  0455 02/26/24  1659   TOTAL PROTEIN 5.8* 6.0 7.3   ALBUMIN 3.7 3.9 4.3   GLOBULIN 2.1 2.1 3.0   ALT (SGPT) 33 46* 20   AST (SGOT) 22 32 24   BILIRUBIN 0.5 0.6 0.5   ALK PHOS 75 86 63   LIPASE  --   --  45                     Brief Urine Lab Results  (Last result in the past 365 days)        Color   Clarity   Blood   Leuk Est   Nitrite   Protein   CREAT   Urine HCG        02/26/24 1704  Yellow   Cloudy   Negative   Negative   Negative   30 mg/dL (1+)                 Microbiology Results (last 10 days)       ** No results found for the last 240 hours. **            MRI Lumbar Spine Without Contrast    Result Date: 2/29/2024  Impression: Lumbar spondylosis with neural foraminal narrowing at L3-L4, L4-L5, and L5-S1. No significant central spinal canal stenosis is identified within the lumbar spine. Please see above for additional details. Electronically Signed: Hudson sIidro MD  2/29/2024 5:32 PM EST  Workstation ID: [id number]    XR Abdomen KUB    Result Date: 2/28/2024  Impression: Impression: Moderate stool burden in the descending colon. Electronically Signed: Marcie Cramer MD  2/28/2024 4:15 PM EST  Workstation ID: [id number]    US Renal Bilateral    Result Date: 2/28/2024  Impression: 1. No hydronephrosis. 2. Small right renal cyst. Previously documented left renal cyst on CT abdomen are not visualized on today's ultrasound. 3. No renal stone is identified. Electronically Signed: Marcie Cramer MD  2/28/2024 10:16 AM EST  Workstation ID: [id number]    CT Abdomen Pelvis With Contrast    Result Date: 2/26/2024  Impression: Impression: 1.Urinary bladder distention. Correlate for history of bladder outlet obstruction or neurogenic bladder. Resultant mild bilateral hydroureteronephrosis, left greater than right. There is a potentially associated 5.9 cm left posterior bladder diverticulum. 2.Other incidental nonemergent findings as detailed above. Electronically Signed: Santiago Montenegro MD  2/26/2024 6:10 PM EST  Workstation ID: [id number]             Results for orders placed during the hospital encounter of 11/02/23    Adult Transthoracic Echo Complete W/ Cont if Necessary Per Protocol    Interpretation Summary    Left ventricular systolic function is normal. Calculated left ventricular EF = 63% Left ventricular ejection fraction appears to be 61 - 65%.    Left ventricular diastolic function is consistent  with (grade II w/high LAP) pseudonormalization.    The left atrial cavity is dilated.    Estimated right ventricular systolic pressure from tricuspid regurgitation is normal (<35 mmHg).    No significant valvular abnormalities noted.      Labs Pending at Discharge:      Procedures Performed           Consults:   Consults       Date and Time Order Name Status Description    2/28/2024 10:21 AM Inpatient Hospitalist Consult      2/28/2024 10:20 AM Inpatient Gastroenterology Consult Completed     2/27/2024  7:06 AM Inpatient Urology Consult Completed         Attestation signed by Hola Farias MD at 03/01/24 1520     I have reviewed this documentation and agree.  I have performed the physical and decision-making component of this encounter.  Patient reports that she is feeling better with 2 bowel movement so far this morning.  Denies abdominal pain  Physical exam  Abdomen is soft, nontender, nondistended  Otherwise benign physical exam  Labs-noted  Assessment plan  Generalized abdominal pain-improved with bowel evacuation.  I suspect this is all due to severe constipation  Nausea-improved  Constipation-continue Movantik daily.  Also continue MiraLAX.  Dysphagia/dyspepsia-will need EGD as an outpatient  Okay to be discharged from a GI standpoint.  GI will sign off with follow-up in clinic in 4 to 6 weeks         Assessment:    Urinary retention    DDD (degenerative disc disease), lumbar    Vitamin D deficiency    Primary hypertension    Anxiety and depression    Gastroesophageal reflux disease    Chronic obstructive pulmonary disease    Tension headache        Large volume retention resulting in bilateral hydronephrosis  Constipation     Plan:     Renal ultrasound with resolved hydronephrosis   Cystoscopy and voiding trial at bedside today  May intermittently catheterized for postvoid residuals greater than 500 cc, if still retaining upon discharge discharged with Owen in place we will set up urodynamics as  outpatient     Tristin Parish MD  [facility]  [address], [address]  [place] [address]  Office: [phone]  Available via [employer]  03/01/24  12:22 EST      Discharge Details        Discharge Medications        New Medications        Instructions Start Date   lubiprostone 24 MCG capsule  Commonly known as: AMITIZA   24 mcg, Oral, 2 Times Daily With Meals      Naloxegol Oxalate 25 MG tablet  Commonly known as: MOVANTIK   25 mg, Oral, Every Morning      polyethylene glycol 17 g packet  Commonly known as: MIRALAX   17 g, Oral, Daily PRN      sennosides-docusate 8.6-50 MG per tablet  Commonly known as: PERICOLACE   2 tablets, Oral, 2 Times Daily             Changes to Medications        Instructions Start Date   cloNIDine 0.2 MG tablet  Commonly known as: CATAPRES  What changed: how much to take   0.1 mg, Oral, 2 Times Daily             Continue These Medications        Instructions Start Date   albuterol sulfate  (90 Base) MCG/ACT inhaler  Commonly known as: PROVENTIL HFA;VENTOLIN HFA;PROAIR HFA   albuterol sulfate HFA 90 mcg/actuation aerosol inhaler   INHALE 2 PUFFS BY MOUTH EVERY 6 HOURS AS NEEDED      albuterol (2.5 MG/3ML) 0.083% nebulizer solution  Commonly known as: PROVENTIL   2.5 mg, Nebulization, Every 4 Hours PRN      atorvastatin 40 MG tablet  Commonly known as: LIPITOR   Take 1 tablet by mouth Every Night.      Breztri Aerosphere 160-9-4.8 MCG/ACT aerosol inhaler  Generic drug: Budeson-Glycopyrrol-Formoterol   INHALE 2 PUFFS BY MOUTH TWICE DAILY      dexlansoprazole 60 MG capsule  Commonly known as: DEXILANT   60 mg, Oral, Daily      hydrALAZINE 25 MG tablet  Commonly known as: APRESOLINE   TAKE 1 TABLET BY MOUTH THREE TIMES DAILY      Lidocaine (Anorectal) 5 % cream cream  Commonly known as: LMX 5   Apply nightly as needed.      lidocaine 5 %  Commonly known as: LIDODERM   Place 1 patch on the skin as directed by provider Daily.      losartan-hydrochlorothiazide 100-25 MG  per tablet  Commonly known as: HYZAAR   1 tablet, Oral, Daily      oxyCODONE-acetaminophen  MG per tablet  Commonly known as: PERCOCET   1 tablet, Oral, 2 Times Daily PRN      sertraline 100 MG tablet  Commonly known as: ZOLOFT   TAKE 1 AND 1/2 TABLETS BY MOUTH DAILY             Stop These Medications      atenolol 100 MG tablet  Commonly known as: TENORMIN              No Known Allergies      Discharge Disposition:   Home or Self Care    Diet:  Hospital:  Diet Order   Procedures    Diet: Liquid Diets; Clear Liquid; Fluid Consistency: Thin (IDDSI 0)         Discharge Activity:   Activity Instructions       Gradually Increase Activity Until at Pre-Hospitalization Level                CODE STATUS:  Code Status and Medical Interventions:   Ordered at: 02/27/24 0025     Level Of Support Discussed With:    Patient     Code Status (Patient has no pulse and is not breathing):    CPR (Attempt to Resuscitate)     Medical Interventions (Patient has pulse or is breathing):    Full Support         Future Appointments   Date Time Provider Department Center   3/8/2024  1:00 PM Teresa Fernandez APRN [facility]       Additional Instructions for the Follow-ups that You Need to Schedule       Discharge Follow-up with PCP   As directed       Currently Documented PCP:    Teresa Fernandez APRN    PCP Phone Number:    [phone]     Follow Up Details: 1 week        Discharge Follow-up with Specified Provider: GI; 2 Weeks   As directed      To: GI   Follow Up: 2 Weeks        Discharge Follow-up with Specified Provider: urology; 1 Week   As directed      To: urology   Follow Up: 1 Week                Time spent on Discharge including face to face service:  >30 minutes    Signature: Electronically signed by Erasmo Izquierdo MD, 03/01/24, 09:03 EST.  [facility] Hospitalist Team

## 2024-03-01 NOTE — PROGRESS NOTES
LOS: 2 days   Patient Care Team:  Teresa Fernandez APRN as PCP - General (Nurse Practitioner)  Thomas Roa MD as Consulting Physician (Cardiology)  Barby Hopper MD as Consulting Physician (Cardiology)      Subjective     Interval History:     Subjective: Patient reports that she is feeling better.  Reports at least 2 bowel movements so far overnight and this morning.  Denies abdominal pain and states that abdomen is much softer.  No nausea/vomiting.  Tolerating clears.      ROS:   No chest pain, shortness of breath, or cough.        Medication Review:     Current Facility-Administered Medications:     acetaminophen (TYLENOL) tablet 650 mg, 650 mg, Oral, Q4H PRN, Laura Rivera APRN    albuterol (PROVENTIL) nebulizer solution 0.083% 2.5 mg/3mL, 2.5 mg, Nebulization, Q4H PRN, Laura Rivera APRN, 2.5 mg at 02/29/24 1030    atorvastatin (LIPITOR) tablet 40 mg, 40 mg, Oral, Daily, Stephanie Murry APRN, 40 mg at 03/01/24 0935    sennosides-docusate (PERICOLACE) 8.6-50 MG per tablet 2 tablet, 2 tablet, Oral, BID, 2 tablet at 02/29/24 0947 **AND** polyethylene glycol (MIRALAX) packet 17 g, 17 g, Oral, Daily PRN **AND** bisacodyl (DULCOLAX) EC tablet 5 mg, 5 mg, Oral, Daily PRN, 5 mg at 02/27/24 0913 **AND** bisacodyl (DULCOLAX) suppository 10 mg, 10 mg, Rectal, Daily PRN, Laura Rivera APRN    cloNIDine (CATAPRES) tablet 0.2 mg, 0.2 mg, Oral, BID, Laura Rivera APRN, 0.2 mg at 03/01/24 0934    hydrALAZINE (APRESOLINE) tablet 25 mg, 25 mg, Oral, TID, Laura Rivera APRN, 25 mg at 03/01/24 0935    losartan (COZAAR) tablet 100 mg, 100 mg, Oral, Q24H, 100 mg at 03/01/24 0935 **AND** hydroCHLOROthiazide tablet 25 mg, 25 mg, Oral, Q24H, Stephanie Murry APRN, 25 mg at 03/01/24 0935    Lidocaine 4 % 1 patch, 1 patch, Transdermal, Q24H, Laura Rivera APRN, 1 patch at 02/29/24 0947    lubiprostone (AMITIZA) capsule 24 mcg, 24 mcg, Oral, BID With Meals, Izquierdo,  Erasmo ADORNO MD, 24 mcg at 03/01/24 0935    melatonin tablet 5 mg, 5 mg, Oral, Nightly PRN, Laura Rivera APRN, 5 mg at 02/27/24 0051    Naloxegol Oxalate (MOVANTIK) tablet 25 mg, 25 mg, Oral, QAM, Claire Hughes APRN, 25 mg at 03/01/24 0934    nitroglycerin (NITROSTAT) SL tablet 0.4 mg, 0.4 mg, Sublingual, Q5 Min PRN, Stephanie Murry APRN    ondansetron (ZOFRAN) injection 4 mg, 4 mg, Intravenous, Q6H PRN, Laura Rivera APRN    oxyCODONE (ROXICODONE) immediate release tablet 10 mg, 10 mg, Oral, Q4H PRN, Laura Rivera APRN, 10 mg at 03/01/24 0629    pantoprazole (PROTONIX) EC tablet 40 mg, 40 mg, Oral, BID AC, Stephanie Murry APRN, 40 mg at 03/01/24 0935    sertraline (ZOLOFT) tablet 150 mg, 150 mg, Oral, Daily, Laura Rivera APRN, 150 mg at 03/01/24 0935    [COMPLETED] Insert Peripheral IV, , , Once **AND** sodium chloride 0.9 % flush 10 mL, 10 mL, Intravenous, PRN, Teresa Parsons APRN    sodium chloride 0.9 % flush 10 mL, 10 mL, Intravenous, Q12H, Laura Rivera APRN, 10 mL at 03/01/24 0936    sodium chloride 0.9 % flush 10 mL, 10 mL, Intravenous, PRN, Laura Rivera APRN    sodium chloride 0.9 % infusion 40 mL, 40 mL, Intravenous, PRN, Laura Rivera APRN      Objective     Vital Signs  Vitals:    02/29/24 2120 02/29/24 2222 03/01/24 0219 03/01/24 0604   BP: 98/71 150/61 146/65 107/54   BP Location:  Right arm Right arm Right arm   Patient Position:  Lying Lying Lying   Pulse: 58 82 58 51   Resp:  16 16 16   Temp:  97.9 °F (36.6 °C) 98.5 °F (36.9 °C) 98.5 °F (36.9 °C)   TempSrc:  Oral Oral Oral   SpO2:  95% 95% 98%   Weight:       Height:           Physical Exam:     General Appearance:    Awake and alert, in no acute distress   Head:    Normocephalic, without obvious abnormality   Eyes:          Conjunctivae normal, anicteric sclera   Throat:   No oral lesions, no thrush, oral mucosa moist   Neck:   No adenopathy, supple, no JVD   Lungs:      respirations regular, even and unlabored   Abdomen:     Soft, non-tender, no rebound or guarding, non-distended   Rectal:     Deferred   Extremities:   No edema, no cyanosis   Skin:   No bruising or rash, no jaundice        Results Review:    CBC    Results from last 7 days   Lab Units 03/01/24 0514 02/29/24 0455 02/28/24 0348 02/27/24 0441 02/26/24  1659   WBC 10*3/mm3 6.90 6.70 7.90 9.00 9.10   HEMOGLOBIN g/dL 12.1 12.3 12.3 12.6 13.7   PLATELETS 10*3/mm3 113* 119* 129* 140 166     CMP   Results from last 7 days   Lab Units 03/01/24 0514 02/29/24 0455 02/28/24 0348 02/27/24 0441 02/26/24  1659   SODIUM mmol/L 141 141 141 142 141   POTASSIUM mmol/L 3.4* 3.6 3.8 3.5 3.8   CHLORIDE mmol/L 103 104 102 103 102   CO2 mmol/L 28.0 28.0 30.0* 28.0 27.0   BUN mg/dL 14 14 21 29* 22   CREATININE mg/dL 0.89 0.81 0.97 1.08* 1.08*   GLUCOSE mg/dL 111* 118* 106* 98 96   ALBUMIN g/dL 3.7 3.9  --   --  4.3   BILIRUBIN mg/dL 0.5 0.6  --   --  0.5   ALK PHOS U/L 75 86  --   --  63   AST (SGOT) U/L 22 32  --   --  24   ALT (SGPT) U/L 33 46*  --   --  20   LIPASE U/L  --   --   --   --  45     Cr Clearance Estimated Creatinine Clearance: 65.6 mL/min (by C-G formula based on SCr of 0.89 mg/dL).  Coag     HbA1C   Lab Results   Component Value Date    HGBA1C 6.20 (H) 07/11/2023    HGBA1C 6.0 (H) 02/14/2023         Infection     UA    Results from last 7 days   Lab Units 02/26/24  1704   NITRITE UA  Negative   WBC UA /HPF 0-2   BACTERIA UA /HPF None Seen   SQUAM EPITHEL UA /HPF 0-2     Microbiology Results (last 10 days)       ** No results found for the last 240 hours. **          Imaging Results (Last 72 Hours)       Procedure Component Value Units Date/Time    MRI Lumbar Spine Without Contrast [[id number]] Collected: 02/29/24 1726     Updated: 02/29/24 1734    Narrative:      MRI LUMBAR SPINE WO CONTRAST    Date of Exam: 2/29/2024 4:47 PM EST    Indication: Low back pain, increased fracture risk.     Comparison: None  available.    Technique:  Routine multiplanar/multisequence sequence images of the lumbar spine were obtained without contrast administration.        FINDINGS:  There appears to be a spinal stimulator which produces susceptibility artifact limiting evaluation of the surrounding structures. Lumbar spine alignment appears unremarkable. Visualized marrow signal is unremarkable. Vertebral body heights are normal.   Lumbar discs appear somewhat desiccated. Conus is obscured due to susceptibility artifact. The visualized paraspinal soft tissues are without significant abnormality. Small bilateral renal cysts are noted.    T12-L1: Limited evaluation of this level due to susceptibility artifact. No definite spinal canal or neuroforaminal stenosis    L1-L2: Mild broad-based disc bulge. No significant spinal canal or neural foraminal stenosis.    L2-L3: Mild broad-based disc bulge. No significant spinal canal or neural foraminal stenosis.    L3-L4: Left foraminal disc bulge superimposed upon a broad-based disc bulge. Mild left neural foraminal narrowing. Spinal canal and right neural foramen are not significantly narrowed.    L4-L5: Broad-based disc bulge and bilateral facet arthropathy contribute to mild to moderate bilateral neural foraminal stenosis. Spinal canal is not significantly narrowed.    L5-S1: Broad-based disc bulge and facet arthropathy contribute to mild/moderate bilateral neural foraminal stenosis. Spinal canal is not significantly narrowed.      Impression:      Lumbar spondylosis with neural foraminal narrowing at L3-L4, L4-L5, and L5-S1. No significant central spinal canal stenosis is identified within the lumbar spine. Please see above for additional details.    Electronically Signed: Hudson Isidro MD    2/29/2024 5:32 PM EST    Workstation ID: [id number]    XR Abdomen KUB [[id number]] Collected: 02/28/24 1614     Updated: 02/28/24 1617    Narrative:      XR ABDOMEN KUB    Date of Exam: 2/28/2024 3:28 PM  EST    Indication: assess stool burden    Comparison: CT abdomen pelvis 2/26/2024    Findings:  Moderate stool burden is suggested within the descending colon. No abnormally dilated small bowel loops are identified. No pneumatosis or gross free intraperitoneal air is evident. Spinal stimulator device is present. Cholecystectomy changes are noted.   Lung bases are clear. Mild degenerative spurring in the spine.      Impression:      Impression:  Moderate stool burden in the descending colon.      Electronically Signed: Marcie Cramer MD    2/28/2024 4:15 PM EST    Workstation ID: [id number]    US Renal Bilateral [[id number]] Collected: 02/28/24 1012     Updated: 02/28/24 1018    Narrative:      US RENAL BILATERAL    Date of Exam: 2/28/2024 9:41 AM EST    Indication: hydronephrosis.    Comparison: CT abdomen pelvis 2/26/2024.    Technique: Grayscale and color Doppler ultrasound evaluation of the kidneys and urinary bladder was performed.      Findings:  The right kidney measures 8.7 x 4.6 x 5.4 cm. Right kidney maintains normal cortical thickness and cortical echotexture. The left kidney measures 9.1 x 4.8 x 5.2 cm, it maintains normal cortical thickness and echotexture. A cyst at the right upper renal   pole measures 9 mm. No cystic or solid left renal abnormality is identified. No shadowing echogenic renal stone is identified on either side. There is no right or left hydronephrosis. Cyst seen within the left kidney on prior CT are not identified on   today's examination. Urinary bladder is not visualized, decompressed by Owen catheter          Impression:      1. No hydronephrosis.  2. Small right renal cyst. Previously documented left renal cyst on CT abdomen are not visualized on today's ultrasound.  3. No renal stone is identified.        Electronically Signed: Marcie Cramer MD    2/28/2024 10:16 AM EST    Workstation ID: [id number]            Assessment & Plan     ASSESSMENT:  -Generalized abdominal  pain  -Nausea  -Incomplete evacuation/constipation  -Dyspepsia  -Dysphagia  -Urinary retention  -Anxiety/depression  -Hypertension  -Hyperlipidemia  -History of appendectomy  -History of hysterectomy  -History of cholecystectomy     PLAN:  Patient is a 68-year-old female with history of appendectomy, hysterectomy, and cholecystectomy who presented on 2/26 with complaints of abdominal pain and urinary retention. CT abdomen/pelvis W on admission showed urinary bladder distention with resultant mild bilateral hydroureteronephrosis.    Patient reports that she is feeling much better.  Abdominal pain has significantly decreased.  She reports 2 bowel movements overnight and this morning.  No nausea/vomiting.  Continue Movantik daily.   Diet as tolerated.  Continue PPI twice daily.  Please could consider outpatient EGD for persistent dysphagia.  Okay for discharge home from GI standpoint with follow-up in 4 to 6 weeks.  GI will be available as inpatient as needed.    Electronically signed by ZULEYMA Wilkes, 03/01/24, 9:50 AM EST.

## 2024-03-01 NOTE — PROGRESS NOTES
[facility] MEDICINE SERVICE  DAILY PROGRESS NOTE    NAME: Mona Sheffield  : 1955  MRN: [id number]      LOS: 2 days     PROVIDER OF SERVICE: Erasmo Izquierdo MD    Chief Complaint: Urinary retention    Subjective:     Interval History:  History taken from: patient    History of Present Illness:   68-year-old female with multiple abdominal surgeries was admitted with abdominal pain and difficulty controlling her bowels and bladder.  She states that she cannot empty fully and sometimes when she stands up she just urinates on herself.She proceeded to the ED yesterday due to difficulty controlling bladder and bowel, especially her bladder.Pt followed by urology and recommends voiding trail in a few days.  GI consulted for constipation.    2024 patient seen and examined in bed no acute distress, complaining of urinary incontinence.  Complaints of back pain.  Will obtain MRI of the back.  discussed with RN.  3/1/2024 patient seen and examined in bed no acute distress, vital signs stable, discussed with RN.  Will discharge patient home if okay with urology.    Review of Systems  Constitutional: Negative for fever.   Gastrointestinal:  Positive for bloating, abdominal pain, bowel incontinence and constipation. Negative for nausea.   Genitourinary:  Positive for bladder incontinence, flank pain, incomplete emptying and pelvic pain.   All other systems reviewed and are negative  Objective:     Vital Signs  Temp:  [97.9 °F (36.6 °C)-98.5 °F (36.9 °C)] 98.5 °F (36.9 °C)  Heart Rate:  [45-82] 51  Resp:  [12-16] 16  BP: ()/(54-88) 107/54   Body mass index is 28.62 kg/m².    Physical Exam    Appearance: Normal appearance.   HENT:      Head: Normocephalic and atraumatic.      Right Ear: External ear normal.      Left Ear: External ear normal.      Nose: Nose normal.      Mouth/Throat:      Mouth: Mucous membranes are moist.      Pharynx: Oropharynx is clear.   Eyes:      Extraocular Movements:  Extraocular movements intact.   Cardiovascular:      Rate and Rhythm: Normal rate and regular rhythm.      Pulses: Normal pulses.      Heart sounds: Normal heart sounds.   Pulmonary:      Effort: Pulmonary effort is normal.      Breath sounds: Normal breath sounds.   Abdominal:      General: Abdomen is flat. Bowel sounds are normal.      Palpations: Abdomen is soft.      Tenderness: There is abdominal tenderness.      Comments: Scar noted on abdomen, midline.    Musculoskeletal:         General: Normal range of motion.      Cervical back: Normal range of motion.   Skin:     General: Skin is warm.   Neurological:      General: No focal deficit present.      Mental Status: She is alert and oriented to person, place, and time.   Psychiatric:         Mood and Affect: Mood normal.         Behavior: Behavior normal.   Scheduled Meds   atorvastatin, 40 mg, Oral, Daily  cloNIDine, 0.2 mg, Oral, BID  hydrALAZINE, 25 mg, Oral, TID  losartan, 100 mg, Oral, Q24H   And  hydroCHLOROthiazide, 25 mg, Oral, Q24H  Lidocaine, 1 patch, Transdermal, Q24H  lubiprostone, 24 mcg, Oral, BID With Meals  Naloxegol Oxalate, 25 mg, Oral, QAM  pantoprazole, 40 mg, Oral, BID AC  senna-docusate sodium, 2 tablet, Oral, BID  sertraline, 150 mg, Oral, Daily  sodium chloride, 10 mL, Intravenous, Q12H       PRN Meds     acetaminophen    albuterol    senna-docusate sodium **AND** polyethylene glycol **AND** bisacodyl **AND** bisacodyl    melatonin    nitroglycerin    ondansetron    oxyCODONE    [COMPLETED] Insert Peripheral IV **AND** sodium chloride    sodium chloride    sodium chloride   Infusions         Diagnostic Data    Results from last 7 days   Lab Units 03/01/24  0514   WBC 10*3/mm3 6.90   HEMOGLOBIN g/dL 12.1   HEMATOCRIT % 36.4   PLATELETS 10*3/mm3 113*   GLUCOSE mg/dL 111*   CREATININE mg/dL 0.89   BUN mg/dL 14   SODIUM mmol/L 141   POTASSIUM mmol/L 3.4*   AST (SGOT) U/L 22   ALT (SGPT) U/L 33   ALK PHOS U/L 75   BILIRUBIN mg/dL 0.5   ANION  GAP mmol/L 10.0       MRI Lumbar Spine Without Contrast    Result Date: 2/29/2024  Lumbar spondylosis with neural foraminal narrowing at L3-L4, L4-L5, and L5-S1. No significant central spinal canal stenosis is identified within the lumbar spine. Please see above for additional details. Electronically Signed: Hudson Isidro MD  2/29/2024 5:32 PM EST  Workstation ID: [id number]    XR Abdomen KUB    Result Date: 2/28/2024  Impression: Moderate stool burden in the descending colon. Electronically Signed: Marcie Cramer MD  2/28/2024 4:15 PM EST  Workstation ID: [id number]    US Renal Bilateral    Result Date: 2/28/2024  1. No hydronephrosis. 2. Small right renal cyst. Previously documented left renal cyst on CT abdomen are not visualized on today's ultrasound. 3. No renal stone is identified. Electronically Signed: Marcie Cramer MD  2/28/2024 10:16 AM EST  Workstation ID: [id number]       I reviewed the patient's new clinical results.    Assessment/Plan:     Active and Resolved Problems  Active Hospital Problems    Diagnosis  POA    **Urinary retention [R33.9]  Yes      Resolved Hospital Problems   No resolved problems to display.       Urinary retention  -CMP showed BUN 29 and creatinine 1.08 but otherwise unremarkable  -CBC and lipase unremarkable  -UA negative for nitrites, blood, bacteria  -CT abdomen pelvis showed urinary bladder distention and mild bilateral hydroureteronephrosis, left greater than right  -In the ED patient given Norco and iopamidol  -Jackson catheter inserted in the ED and per ED's notes patient had 2000 urinary output shortly after insertion  -Urology consulted and recommends continued jackson and voiding trial in a few days  - possible cystoscopy in am  - Renal u/s pending  -Oxycodone ordered as needed  -Gentle hydration     Constipation  - ct showed constipation  - GI consulted     Hypertension-Moderately controlled   - Continue clonidine, and hydralazine, and Hyzaar  -Atenolol on hold due to  bradycardia  - Monitor while admitted      Hyperlipidemia  -Continue Lipitor     Depression/anxiety  -Continue Zoloft     Overweight-BMI 28.62  -Lifestyle modifications     DVT prophylaxis:  Mechanical DVT prophylaxis orders are present.      Code status is   Code Status and Medical Interventions:   Ordered at: 02/27/24 0025     Level Of Support Discussed With:    Patient     Code Status (Patient has no pulse and is not breathing):    CPR (Attempt to Resuscitate)     Medical Interventions (Patient has pulse or is breathing):    Full Support       Plan for disposition:home in 2 days    Time: 30 minutes    Signature: Electronically signed by Erasmo Izquierdo MD, 03/01/24, 08:56 EST.  [facility] Hospitalist Team

## 2024-03-01 NOTE — PLAN OF CARE
Goal Outcome Evaluation:  Plan of Care Reviewed With: patient        Progress: improving  Outcome Evaluation: pt to d/c this morning.

## 2024-03-01 NOTE — PROGRESS NOTES
"  [facility] DAILY PROGRESS NOTE    Patient Identification  Name: Mona Sheffield  Age: 68 y.o.  Sex: female  :  1955  MRN: [id number]    Date: 3/1/2024             Subjective:  Interval History: Ready for cystoscopy    Objective:    Scheduled Meds:atorvastatin, 40 mg, Oral, Daily  cloNIDine, 0.2 mg, Oral, BID  hydrALAZINE, 25 mg, Oral, TID  losartan, 100 mg, Oral, Q24H   And  hydroCHLOROthiazide, 25 mg, Oral, Q24H  Lidocaine, 1 patch, Transdermal, Q24H  lubiprostone, 24 mcg, Oral, BID With Meals  Naloxegol Oxalate, 25 mg, Oral, QAM  pantoprazole, 40 mg, Oral, BID AC  senna-docusate sodium, 2 tablet, Oral, BID  sertraline, 150 mg, Oral, Daily  sodium chloride, 10 mL, Intravenous, Q12H      Continuous Infusions:   PRN Meds:  acetaminophen    albuterol    senna-docusate sodium **AND** polyethylene glycol **AND** bisacodyl **AND** bisacodyl    melatonin    nitroglycerin    ondansetron    oxyCODONE    [COMPLETED] Insert Peripheral IV **AND** sodium chloride    sodium chloride    sodium chloride    Vital signs in last 24 hours:  Temp:  [97.9 °F (36.6 °C)-98.5 °F (36.9 °C)] 98.5 °F (36.9 °C)  Heart Rate:  [51-82] 51  Resp:  [12-16] 16  BP: ()/(54-88) 107/54    Intake/Output:    Intake/Output Summary (Last 24 hours) at 3/1/2024 1222  Last data filed at 2024 1718  Gross per 24 hour   Intake 480 ml   Output 500 ml   Net -20 ml       Exam:  /54 (BP Location: Right arm, Patient Position: Lying)   Pulse 51   Temp 98.5 °F (36.9 °C) (Oral)   Resp 16   Ht 167.6 cm (65.98\")   Wt 80.4 kg (177 lb 4 oz)   SpO2 98%   BMI 28.62 kg/m²     General Appearance:    Alert, cooperative, NAD   Lungs:     Respirations unlabored, no audible wheezing    Heart:    No cyanosis   Abdomen:     Soft, ND    :    No suprapubic distention       Bedside Cystoscopy  The plan was discussed with the patient and/or family who agreed and consented to bedside cystoscopy as described.  Prior to initiating the procedure a " timeout was performed patient was identified using 2 identifiers and procedure was described, all in the room were in agreement.  The patient was prepped and draped in the usual fashion.  Flexible cystourethroscopy was performed which revealed a patent urethra with no lesions.  Cystoscopy was performed which revealed significant posterior wall catheter edema and large trabeculations with some diverticula of the bladder.  Patient tolerated the procedure well with no complications       Data Review:  All labs (24hrs):   Recent Results (from the past 24 hour(s))   Comprehensive Metabolic Panel    Collection Time: 03/01/24  5:14 AM    Specimen: Arm, Left; Blood   Result Value Ref Range    Glucose 111 (H) 65 - 99 mg/dL    BUN 14 8 - 23 mg/dL    Creatinine 0.89 0.57 - 1.00 mg/dL    Sodium 141 136 - 145 mmol/L    Potassium 3.4 (L) 3.5 - 5.2 mmol/L    Chloride 103 98 - 107 mmol/L    CO2 28.0 22.0 - 29.0 mmol/L    Calcium 9.0 8.6 - 10.5 mg/dL    Total Protein 5.8 (L) 6.0 - 8.5 g/dL    Albumin 3.7 3.5 - 5.2 g/dL    ALT (SGPT) 33 1 - 33 U/L    AST (SGOT) 22 1 - 32 U/L    Alkaline Phosphatase 75 39 - 117 U/L    Total Bilirubin 0.5 0.0 - 1.2 mg/dL    Globulin 2.1 gm/dL    A/G Ratio 1.8 g/dL    BUN/Creatinine Ratio 15.7 7.0 - 25.0    Anion Gap 10.0 5.0 - 15.0 mmol/L    eGFR 71.2 >60.0 mL/min/1.73   CBC Auto Differential    Collection Time: 03/01/24  5:14 AM    Specimen: Arm, Left; Blood   Result Value Ref Range    WBC 6.90 3.40 - 10.80 10*3/mm3    RBC 4.13 3.77 - 5.28 10*6/mm3    Hemoglobin 12.1 12.0 - 15.9 g/dL    Hematocrit 36.4 34.0 - 46.6 %    MCV 88.1 79.0 - 97.0 fL    MCH 29.2 26.6 - 33.0 pg    MCHC 33.2 31.5 - 35.7 g/dL    RDW 13.6 12.3 - 15.4 %    RDW-SD 43.8 37.0 - 54.0 fl    MPV 9.8 6.0 - 12.0 fL    Platelets 113 (L) 140 - 450 10*3/mm3    Neutrophil % 64.1 42.7 - 76.0 %    Lymphocyte % 27.1 19.6 - 45.3 %    Monocyte % 6.2 5.0 - 12.0 %    Eosinophil % 2.0 0.3 - 6.2 %    Basophil % 0.6 0.0 - 1.5 %    Neutrophils, Absolute  4.40 1.70 - 7.00 10*3/mm3    Lymphocytes, Absolute 1.90 0.70 - 3.10 10*3/mm3    Monocytes, Absolute 0.40 0.10 - 0.90 10*3/mm3    Eosinophils, Absolute 0.10 0.00 - 0.40 10*3/mm3    Basophils, Absolute 0.00 0.00 - 0.20 10*3/mm3    nRBC 0.0 0.0 - 0.2 /100 WBC      Imaging Results (Last 24 Hours)       Procedure Component Value Units Date/Time    MRI Lumbar Spine Without Contrast [[id number]] Collected: 02/29/24 1726     Updated: 02/29/24 1734    Narrative:      MRI LUMBAR SPINE WO CONTRAST    Date of Exam: 2/29/2024 4:47 PM EST    Indication: Low back pain, increased fracture risk.     Comparison: None available.    Technique:  Routine multiplanar/multisequence sequence images of the lumbar spine were obtained without contrast administration.        FINDINGS:  There appears to be a spinal stimulator which produces susceptibility artifact limiting evaluation of the surrounding structures. Lumbar spine alignment appears unremarkable. Visualized marrow signal is unremarkable. Vertebral body heights are normal.   Lumbar discs appear somewhat desiccated. Conus is obscured due to susceptibility artifact. The visualized paraspinal soft tissues are without significant abnormality. Small bilateral renal cysts are noted.    T12-L1: Limited evaluation of this level due to susceptibility artifact. No definite spinal canal or neuroforaminal stenosis    L1-L2: Mild broad-based disc bulge. No significant spinal canal or neural foraminal stenosis.    L2-L3: Mild broad-based disc bulge. No significant spinal canal or neural foraminal stenosis.    L3-L4: Left foraminal disc bulge superimposed upon a broad-based disc bulge. Mild left neural foraminal narrowing. Spinal canal and right neural foramen are not significantly narrowed.    L4-L5: Broad-based disc bulge and bilateral facet arthropathy contribute to mild to moderate bilateral neural foraminal stenosis. Spinal canal is not significantly narrowed.    L5-S1: Broad-based disc bulge  and facet arthropathy contribute to mild/moderate bilateral neural foraminal stenosis. Spinal canal is not significantly narrowed.      Impression:      Lumbar spondylosis with neural foraminal narrowing at L3-L4, L4-L5, and L5-S1. No significant central spinal canal stenosis is identified within the lumbar spine. Please see above for additional details.    Electronically Signed: Hudson Isidro MD    2/29/2024 5:32 PM EST    Workstation ID: [id number]             Assessment:    Urinary retention    DDD (degenerative disc disease), lumbar    Vitamin D deficiency    Primary hypertension    Anxiety and depression    Gastroesophageal reflux disease    Chronic obstructive pulmonary disease    Tension headache      Large volume retention resulting in bilateral hydronephrosis  Constipation    Plan:    Renal ultrasound with resolved hydronephrosis   Cystoscopy and voiding trial at bedside today  May intermittently catheterized for postvoid residuals greater than 500 cc, if still retaining upon discharge discharged with Owen in place we will set up urodynamics as outpatient    Tristin Parish MD  [facility]  Formerly [facility] [facility] [address]  [place] [address]  Office: [phone]  Available via [employer] Secure Chat  03/01/24  12:22 EST

## 2024-03-01 NOTE — PLAN OF CARE
Problem: Adult Inpatient Plan of Care  Goal: Plan of Care Review  Outcome: Ongoing, Progressing  Flowsheets (Taken 3/1/2024 0436)  Plan of Care Reviewed With: patient  Outcome Evaluation: Pt rested throughout the shift. Pain controlled with PRN pain medications. Patient has had numerous bowel movements throughout shift. F?C patent and draining. Will continue to monitor  Goal: Patient-Specific Goal (Individualized)  Outcome: Ongoing, Progressing  Goal: Absence of Hospital-Acquired Illness or Injury  Outcome: Ongoing, Progressing  Intervention: Identify and Manage Fall Risk  Recent Flowsheet Documentation  Taken 3/1/2024 0400 by Sanjana Maldonado RN  Safety Promotion/Fall Prevention: safety round/check completed  Taken 3/1/2024 0200 by Sanjana Maldonado RN  Safety Promotion/Fall Prevention: safety round/check completed  Taken 3/1/2024 0000 by Sanjana Maldonado RN  Safety Promotion/Fall Prevention: safety round/check completed  Taken 2/29/2024 2200 by Sanjana Maldonado RN  Safety Promotion/Fall Prevention: safety round/check completed  Taken 2/29/2024 2000 by Sanjana Maldonado RN  Safety Promotion/Fall Prevention: safety round/check completed  Intervention: Prevent Skin Injury  Recent Flowsheet Documentation  Taken 3/1/2024 0400 by Sanjana Maldonado RN  Body Position: position changed independently  Taken 3/1/2024 0000 by Sanjana Maldonado RN  Body Position: position changed independently  Taken 2/29/2024 2000 by Sanjana Maldonado RN  Body Position: position changed independently  Intervention: Prevent and Manage VTE (Venous Thromboembolism) Risk  Recent Flowsheet Documentation  Taken 2/29/2024 2000 by Sanjana Maldonado RN  Range of Motion: active ROM (range of motion) encouraged  Intervention: Prevent Infection  Recent Flowsheet Documentation  Taken 3/1/2024 0400 by Sanjana Maldonado RN  Infection Prevention: rest/sleep promoted  Taken 3/1/2024 0000 by Sanjana Maldonado  RN  Infection Prevention: rest/sleep promoted  Taken 2/29/2024 2000 by Sanjana Maldonado RN  Infection Prevention:   rest/sleep promoted   hand hygiene promoted   single patient room provided  Goal: Optimal Comfort and Wellbeing  Outcome: Ongoing, Progressing  Intervention: Provide Person-Centered Care  Recent Flowsheet Documentation  Taken 2/29/2024 2000 by Sanjana Maldonado RN  Trust Relationship/Rapport:   care explained   choices provided   questions answered   questions encouraged   reassurance provided   thoughts/feelings acknowledged  Goal: Readiness for Transition of Care  Outcome: Ongoing, Progressing     Problem: Asthma Comorbidity  Goal: Maintenance of Asthma Control  Outcome: Ongoing, Progressing  Intervention: Maintain Asthma Symptom Control  Recent Flowsheet Documentation  Taken 3/1/2024 0000 by Sanjana Maldonado RN  Medication Review/Management: medications reviewed  Taken 2/29/2024 2000 by Sanjana Maldonado RN  Medication Review/Management: medications reviewed     Problem: Behavioral Health Comorbidity  Goal: Maintenance of Behavioral Health Symptom Control  Outcome: Ongoing, Progressing  Intervention: Maintain Behavioral Health Symptom Control  Recent Flowsheet Documentation  Taken 3/1/2024 0000 by Sanjana Maldonado RN  Medication Review/Management: medications reviewed  Taken 2/29/2024 2000 by Sanjana Maldonado RN  Medication Review/Management: medications reviewed     Problem: COPD (Chronic Obstructive Pulmonary Disease) Comorbidity  Goal: Maintenance of COPD Symptom Control  Outcome: Ongoing, Progressing  Intervention: Maintain COPD-Symptom Control  Recent Flowsheet Documentation  Taken 3/1/2024 0000 by Sanjana Maldonado RN  Medication Review/Management: medications reviewed  Taken 2/29/2024 2000 by Sanjana Maldonado RN  Supportive Measures: active listening utilized  Medication Review/Management: medications reviewed     Problem: Diabetes Comorbidity  Goal: Blood  Glucose Level Within Targeted Range  Outcome: Ongoing, Progressing     Problem: Heart Failure Comorbidity  Goal: Maintenance of Heart Failure Symptom Control  Outcome: Ongoing, Progressing  Intervention: Maintain Heart Failure-Management  Recent Flowsheet Documentation  Taken 3/1/2024 0000 by Sanjana Maldonado RN  Medication Review/Management: medications reviewed  Taken 2/29/2024 2000 by Sanjana Maldonado RN  Medication Review/Management: medications reviewed     Problem: Hypertension Comorbidity  Goal: Blood Pressure in Desired Range  Outcome: Ongoing, Progressing  Intervention: Maintain Blood Pressure Management  Recent Flowsheet Documentation  Taken 3/1/2024 0000 by Sanjana Maldonado RN  Medication Review/Management: medications reviewed  Taken 2/29/2024 2000 by Sanjana Maldonado RN  Medication Review/Management: medications reviewed     Problem: Obstructive Sleep Apnea Risk or Actual Comorbidity Management  Goal: Unobstructed Breathing During Sleep  Outcome: Ongoing, Progressing     Problem: Osteoarthritis Comorbidity  Goal: Maintenance of Osteoarthritis Symptom Control  Outcome: Ongoing, Progressing  Intervention: Maintain Osteoarthritis Symptom Control  Recent Flowsheet Documentation  Taken 3/1/2024 0000 by Sanjana Maldonado RN  Medication Review/Management: medications reviewed  Taken 2/29/2024 2000 by Sanjana Maldonado RN  Medication Review/Management: medications reviewed     Problem: Pain Chronic (Persistent) (Comorbidity Management)  Goal: Acceptable Pain Control and Functional Ability  Outcome: Ongoing, Progressing  Intervention: Manage Persistent Pain  Recent Flowsheet Documentation  Taken 3/1/2024 0000 by Sanjana Maldonado RN  Medication Review/Management: medications reviewed  Taken 2/29/2024 2000 by Sanjana Maldonado RN  Medication Review/Management: medications reviewed  Intervention: Optimize Psychosocial Wellbeing  Recent Flowsheet Documentation  Taken 2/29/2024  2000 by Sanjana Maldonado RN  Supportive Measures: active listening utilized  Diversional Activities:   television   smartphone     Problem: Seizure Disorder Comorbidity  Goal: Maintenance of Seizure Control  Outcome: Ongoing, Progressing     Problem: Fall Injury Risk  Goal: Absence of Fall and Fall-Related Injury  Outcome: Ongoing, Progressing  Intervention: Identify and Manage Contributors  Recent Flowsheet Documentation  Taken 3/1/2024 0000 by Sanjana Maldonado RN  Medication Review/Management: medications reviewed  Taken 2/29/2024 2000 by Sanjana Maldonado RN  Medication Review/Management: medications reviewed  Intervention: Promote Injury-Free Environment  Recent Flowsheet Documentation  Taken 3/1/2024 0400 by Sanjana Maldonado RN  Safety Promotion/Fall Prevention: safety round/check completed  Taken 3/1/2024 0200 by Sanjana Maldonado RN  Safety Promotion/Fall Prevention: safety round/check completed  Taken 3/1/2024 0000 by Sanjana Maldonado RN  Safety Promotion/Fall Prevention: safety round/check completed  Taken 2/29/2024 2200 by Sanjana Maldonado RN  Safety Promotion/Fall Prevention: safety round/check completed  Taken 2/29/2024 2000 by Sanjana Maldonado RN  Safety Promotion/Fall Prevention: safety round/check completed   Goal Outcome Evaluation:  Plan of Care Reviewed With: patient           Outcome Evaluation: Pt rested throughout the shift. Pain controlled with PRN pain medications. Patient has had numerous bowel movements throughout shift. F?C patent and draining. Will continue to monitor

## 2024-03-01 NOTE — CASE MANAGEMENT/SOCIAL WORK
Continued Stay Note  [facility]     Patient Name: Mona Sheffield  MRN: [id number]  Today's Date: 3/1/2024    Admit Date: 2/26/2024    Plan: Return home with brother.   Discharge Plan       Row Name 03/01/24 1038       Plan    Plan Comments Discharge orders for today. CM met with Ms. Sheffield and discussed home health but she declined services and will follow up with her PCP                  Maintained distance greater than six feet and spent less than 15 minutes in the room.     Brooke MENDOZA,RN Case Manager  [facility]  Phone: Desk- [phone] cell- [phone]

## 2024-03-04 ENCOUNTER — TRANSITIONAL CARE MANAGEMENT TELEPHONE ENCOUNTER (OUTPATIENT)
Dept: CALL CENTER | Facility: HOSPITAL | Age: 69
End: 2024-03-04
Payer: MEDICARE

## 2024-03-04 NOTE — PAYOR COMM NOTE
"  DISCHARGE NOTICE --  [id number]                 This patient discharged HOME on 3/1/24.  Please advise if additional information is needed to finalize this request.    Thank you!      Dahiana Mercedes  Utilization Review Coordinator  [address]  [address]  Ph: [phone]  Fx: [phone]      Ashlee Parks (68 y.o. Female)       Date of Birth   1955    Social Security Number       Address   [address] [place] [id number]    Home Phone   [phone]    MRN   [id number]       [place]   [place]    Marital Status                               Admission Date   2/26/24    Admission Type   Emergency    Admitting Provider   Jackson Gaines MD    Attending Provider       Department, Room/Bed   [facility] OBSERVATION, 224/1       Discharge Date   3/1/2024    Discharge Disposition   Home or Self Care    Discharge Destination                                 Attending Provider: (none)   Allergies: No Known Allergies    Isolation: None   Infection: None   Code Status: Prior    Ht: 167.6 cm (65.98\")   Wt: 80.4 kg (177 lb 4 oz)    Admission Cmt: None   Principal Problem: Urinary retention [R33.9]                   Active Insurance as of 2/26/2024       Primary Coverage       Payor Plan Insurance Group Employer/Plan Group    [employer]NA MEDICARE REPLACEMENT [id number]       Payor Plan Address Payor Plan Phone Number Payor Plan Fax Number Effective Dates    [address] [phone]  1/1/2024 - None Entered    [facility] [id number]         Subscriber Name Subscriber Birth Date Member ID       ASHLEE PARKS 1955 [id number]                     Emergency Contacts        (Rel.) Home Phone Work Phone Mobile Phone    LUIZ PERALTA (Brother) [phone] -- --          "

## 2024-03-04 NOTE — OUTREACH NOTE
Call Center TCM Note      Flowsheet Row Responses   [facility] facility patient discharged from? [facility]   Does the patient have one of the following disease processes/diagnoses(primary or secondary)? Other   TCM attempt successful? No   Unsuccessful attempts Attempt 2            Debra Foley LPN    3/4/2024, 13:24 EST

## 2024-03-04 NOTE — OUTREACH NOTE
Call Center TCM Note      Flowsheet Row Responses   [facility] facility patient discharged from? [facility]   Does the patient have one of the following disease processes/diagnoses(primary or secondary)? Other   TCM attempt successful? No   Unsuccessful attempts Attempt 1            Debra Foley LPN    3/4/2024, 10:43 EST

## 2024-03-05 ENCOUNTER — TRANSITIONAL CARE MANAGEMENT TELEPHONE ENCOUNTER (OUTPATIENT)
Dept: CALL CENTER | Facility: HOSPITAL | Age: 69
End: 2024-03-05
Payer: MEDICARE

## 2024-03-05 NOTE — OUTREACH NOTE
Call Center TCM Note      Flowsheet Row Responses   [facility] facility patient discharged from? [facility]   Does the patient have one of the following disease processes/diagnoses(primary or secondary)? Other   TCM attempt successful? No   Unsuccessful attempts Attempt 3            Debra Foley LPN    3/5/2024, 10:12 EST

## 2024-03-11 RX ORDER — SERTRALINE HYDROCHLORIDE 100 MG/1
TABLET, FILM COATED ORAL
Qty: 135 TABLET | Refills: 0 | Status: SHIPPED | OUTPATIENT
Start: 2024-03-11

## 2024-03-11 RX ORDER — ATENOLOL 100 MG/1
TABLET ORAL
Qty: 90 TABLET | Refills: 0 | OUTPATIENT
Start: 2024-03-11

## 2024-03-11 RX ORDER — LOSARTAN POTASSIUM AND HYDROCHLOROTHIAZIDE 25; 100 MG/1; MG/1
1 TABLET ORAL DAILY
Qty: 90 TABLET | Refills: 0 | Status: SHIPPED | OUTPATIENT
Start: 2024-03-11

## 2024-03-11 RX ORDER — ATORVASTATIN CALCIUM 40 MG/1
40 TABLET, FILM COATED ORAL DAILY
Qty: 90 TABLET | Refills: 1 | Status: SHIPPED | OUTPATIENT
Start: 2024-03-11

## 2024-04-05 ENCOUNTER — OFFICE VISIT (OUTPATIENT)
Dept: FAMILY MEDICINE CLINIC | Facility: CLINIC | Age: 69
End: 2024-04-05
Payer: MEDICARE

## 2024-04-05 VITALS
OXYGEN SATURATION: 97 % | HEIGHT: 66 IN | WEIGHT: 170 LBS | BODY MASS INDEX: 27.32 KG/M2 | HEART RATE: 68 BPM | SYSTOLIC BLOOD PRESSURE: 122 MMHG | DIASTOLIC BLOOD PRESSURE: 70 MMHG

## 2024-04-05 DIAGNOSIS — R73.03 PREDIABETES: ICD-10-CM

## 2024-04-05 DIAGNOSIS — F32.A ANXIETY AND DEPRESSION: ICD-10-CM

## 2024-04-05 DIAGNOSIS — M51.36 DDD (DEGENERATIVE DISC DISEASE), LUMBAR: Primary | ICD-10-CM

## 2024-04-05 DIAGNOSIS — J44.9 CHRONIC OBSTRUCTIVE PULMONARY DISEASE, UNSPECIFIED COPD TYPE: ICD-10-CM

## 2024-04-05 DIAGNOSIS — F41.9 ANXIETY AND DEPRESSION: ICD-10-CM

## 2024-04-05 DIAGNOSIS — M25.551 PAIN OF BOTH HIP JOINTS: ICD-10-CM

## 2024-04-05 DIAGNOSIS — E78.2 MIXED HYPERLIPIDEMIA: ICD-10-CM

## 2024-04-05 DIAGNOSIS — I10 PRIMARY HYPERTENSION: ICD-10-CM

## 2024-04-05 DIAGNOSIS — R33.9 URINARY RETENTION: ICD-10-CM

## 2024-04-05 DIAGNOSIS — Z00.00 PREVENTATIVE HEALTH CARE: ICD-10-CM

## 2024-04-05 DIAGNOSIS — M25.552 PAIN OF BOTH HIP JOINTS: ICD-10-CM

## 2024-04-05 LAB
DEPRECATED RDW RBC AUTO: 43.2 FL (ref 37–54)
ERYTHROCYTE [DISTWIDTH] IN BLOOD BY AUTOMATED COUNT: 13.2 % (ref 12.3–15.4)
HBA1C MFR BLD: 6 % (ref 4.8–5.6)
HCT VFR BLD AUTO: 41.1 % (ref 34–46.6)
HGB BLD-MCNC: 13.7 G/DL (ref 12–15.9)
MCH RBC QN AUTO: 29.7 PG (ref 26.6–33)
MCHC RBC AUTO-ENTMCNC: 33.3 G/DL (ref 31.5–35.7)
MCV RBC AUTO: 89 FL (ref 79–97)
PLATELET # BLD AUTO: 179 10*3/MM3 (ref 140–450)
PMV BLD AUTO: 11.6 FL (ref 6–12)
RBC # BLD AUTO: 4.62 10*6/MM3 (ref 3.77–5.28)
WBC NRBC COR # BLD AUTO: 8.29 10*3/MM3 (ref 3.4–10.8)

## 2024-04-05 PROCEDURE — 84443 ASSAY THYROID STIM HORMONE: CPT | Performed by: NURSE PRACTITIONER

## 2024-04-05 PROCEDURE — 80061 LIPID PANEL: CPT | Performed by: NURSE PRACTITIONER

## 2024-04-05 PROCEDURE — 85027 COMPLETE CBC AUTOMATED: CPT | Performed by: NURSE PRACTITIONER

## 2024-04-05 PROCEDURE — 80053 COMPREHEN METABOLIC PANEL: CPT | Performed by: NURSE PRACTITIONER

## 2024-04-05 PROCEDURE — 83036 HEMOGLOBIN GLYCOSYLATED A1C: CPT | Performed by: NURSE PRACTITIONER

## 2024-04-05 RX ORDER — LUBIPROSTONE 24 UG/1
24 CAPSULE ORAL 2 TIMES DAILY WITH MEALS
Qty: 60 CAPSULE | Refills: 0 | Status: SHIPPED | OUTPATIENT
Start: 2024-04-05

## 2024-04-05 RX ORDER — CLONIDINE HYDROCHLORIDE 0.1 MG/1
0.1 TABLET ORAL 2 TIMES DAILY
Qty: 60 TABLET | Refills: 0 | Status: SHIPPED | OUTPATIENT
Start: 2024-04-05

## 2024-04-05 RX ORDER — CLONIDINE HYDROCHLORIDE 0.1 MG/1
0.1 TABLET ORAL 2 TIMES DAILY
Qty: 180 TABLET | OUTPATIENT
Start: 2024-04-05

## 2024-04-05 RX ORDER — LIDOCAINE 5 G/100G
CREAM RECTAL; TOPICAL
Qty: 60 G | Refills: 1 | Status: SHIPPED | OUTPATIENT
Start: 2024-04-05

## 2024-04-05 RX ORDER — LUBIPROSTONE 24 UG/1
24 CAPSULE ORAL 2 TIMES DAILY WITH MEALS
Qty: 180 CAPSULE | OUTPATIENT
Start: 2024-04-05

## 2024-04-05 RX ORDER — ALBUTEROL SULFATE 2.5 MG/3ML
2.5 SOLUTION RESPIRATORY (INHALATION) EVERY 4 HOURS PRN
Qty: 180 ML | Refills: 0 | Status: SHIPPED | OUTPATIENT
Start: 2024-04-05

## 2024-04-05 RX ORDER — POLYETHYLENE GLYCOL 3350 17 G/17G
17 POWDER, FOR SOLUTION ORAL DAILY PRN
Qty: 510 G | Refills: 0 | Status: SHIPPED | OUTPATIENT
Start: 2024-04-05

## 2024-04-05 NOTE — PROGRESS NOTES
Venipuncture Blood Specimen Collection  Venipuncture performed in the right arm by Marianela Flores MA with good hemostasis. Patient tolerated the procedure well without complications.   04/05/24   Marianela Flores MA

## 2024-04-05 NOTE — PROGRESS NOTES
"Chief Complaint  Follow-up (Follow up COPD )    Subjective        Mona Sheffield presents to [facility]  History of Present Illness    Patient presents for f/u visit.     Patent has COPD, prescribed Breztri and albuterol PRN dyspnea. She reports symptoms are stable, has no acute complaints.  Patient has MURTAZA, previously reported that she was tolerating her CPAP poorly.     Patient has hypertension, taking Clonidine 0.1 mg BID, Hydralazine 25 mg TID, Hyzaar daily and Clonidine BID.  Blood pressure is stable.  She is taking Lipitor 40 mg daily. Patient followed by Cardiology - referred last year for further evaluation for chest pain.  Stress test and echocardiogram were ordered.  Stress test was negative for ischemia.  Echocardiogram showed normal left ventricular systolic function, diastolic function consistent with pseudonormalization and there was dilation of the left atrial cavity. Pacemaker recommended.     Patient referred to Urology due to recurrent UTI. She reported having episodes where she spontaneously empties her bladder. Patient evaluated, advised a bladder stimulator was needed. She is currently using straight catheters.     Depression and anxiety, stable on Zoloft 150 mg daily.     Hx DDD, also had generalized joint pain. Patient takes Percocet PRN severe pain but is needing something PRN for joint pain.     Objective   Vital Signs:  /70 (BP Location: Left arm, Patient Position: Sitting, Cuff Size: Adult)   Pulse 68   Ht 167.6 cm (66\")   Wt 77.1 kg (170 lb)   SpO2 97%   BMI 27.44 kg/m²   Estimated body mass index is 27.44 kg/m² as calculated from the following:    Height as of this encounter: 167.6 cm (66\").    Weight as of this encounter: 77.1 kg (170 lb).             Physical Exam  Constitutional:       Appearance: Normal appearance.   HENT:      Head: Normocephalic.   Cardiovascular:      Rate and Rhythm: Normal rate and regular rhythm.   Pulmonary:      " Effort: Pulmonary effort is normal.      Breath sounds: Normal breath sounds.   Abdominal:      General: Abdomen is flat. Bowel sounds are normal.      Palpations: Abdomen is soft.   Musculoskeletal:         General: Normal range of motion.      Cervical back: Neck supple.      Right lower leg: No edema.      Left lower leg: No edema.   Skin:     General: Skin is warm and dry.   Neurological:      Mental Status: She is alert and oriented to person, place, and time.      Gait: Gait is intact.   Psychiatric:         Attention and Perception: Attention normal.         Mood and Affect: Mood normal.         Speech: Speech normal.        Result Review :      CMP          2/28/2024    03:48 2/29/2024    04:55 3/1/2024    05:14   CMP   Glucose 106  118  111    BUN 21  14  14    Creatinine 0.97  0.81  0.89    EGFR 64.2  79.7  71.2    Sodium 141  141  141    Potassium 3.8  3.6  3.4    Chloride 102  104  103    Calcium 9.3  9.0  9.0    Total Protein  6.0  5.8    Albumin  3.9  3.7    Globulin  2.1  2.1    Total Bilirubin  0.6  0.5    Alkaline Phosphatase  86  75    AST (SGOT)  32  22    ALT (SGPT)  46  33    Albumin/Globulin Ratio  1.9  1.8    BUN/Creatinine Ratio 21.6  17.3  15.7    Anion Gap 9.0  9.0  10.0      CBC          2/28/2024    03:48 2/29/2024    04:55 3/1/2024    05:14   CBC   WBC 7.90  6.70  6.90    RBC 4.19  4.18  4.13    Hemoglobin 12.3  12.3  12.1    Hematocrit 37.3  36.6  36.4    MCV 88.9  87.6  88.1    MCH 29.3  29.5  29.2    MCHC 33.0  33.7  33.2    RDW 13.6  13.6  13.6    Platelets 129  119  113      Lipid Panel          7/11/2023    11:26   Lipid Panel   Total Cholesterol 185    Triglycerides 399    HDL Cholesterol 31    VLDL Cholesterol 66    LDL Cholesterol  88    LDL/HDL Ratio 2.39        Most Recent A1C          7/11/2023    11:26   HGBA1C Most Recent   Hemoglobin A1C 6.20                   Assessment and Plan     Diagnoses and all orders for this visit:    1. DDD (degenerative disc disease), lumbar  (Primary)  -     diclofenac (VOLTAREN) 50 MG EC tablet; Take 1 tablet by mouth 2 (Two) Times a Day As Needed (joint pain).  Dispense: 60 tablet; Refill: 1    2. Chronic obstructive pulmonary disease, unspecified COPD type  Assessment & Plan:  Continue Breztri 2 puffs BID  Albuterol PRN      Orders:  -     albuterol (PROVENTIL) (2.5 MG/3ML) 0.083% nebulizer solution; Take 2.5 mg by nebulization Every 4 (Four) Hours As Needed for Wheezing.  Dispense: 180 mL; Refill: 0    3. Anxiety and depression  Assessment & Plan:  Stable on Zoloft 150 mg daily      4. Urinary retention  Assessment & Plan:  Follow up with Urology      5. Prediabetes  Assessment & Plan:  Managing with diet and exercise    Orders:  -     Hemoglobin A1c    6. Primary hypertension  Assessment & Plan:  At goal of < 140/90  Continue medications as prescribed    Orders:  -     CBC (No Diff)  -     Comprehensive Metabolic Panel  -     cloNIDine (CATAPRES) 0.1 MG tablet; Take 1 tablet by mouth 2 (Two) Times a Day.  Dispense: 60 tablet; Refill: 0    7. Mixed hyperlipidemia  Assessment & Plan:  Labs today  Continue Lipitor as prescribed    Orders:  -     Lipid Panel    8. Preventative health care  -     TSH    9. Pain of both hip joints  -     diclofenac (VOLTAREN) 50 MG EC tablet; Take 1 tablet by mouth 2 (Two) Times a Day As Needed (joint pain).  Dispense: 60 tablet; Refill: 1    Other orders  -     lubiprostone (AMITIZA) 24 MCG capsule; Take 1 capsule by mouth 2 (Two) Times a Day With Meals.  Dispense: 60 capsule; Refill: 0  -     polyethylene glycol (MIRALAX) 17 GM/SCOOP powder; Take 17 g by mouth Daily As Needed (Use if senna-docusate is ineffective).  Dispense: 510 g; Refill: 0  -     Lidocaine, Anorectal, (LMX 5) 5 % cream cream; Apply nightly as needed.  Dispense: 60 g; Refill: 1           I spent 30 minutes caring for Mona on this date of service. This time includes time spent by me in the following activities:preparing for the visit, reviewing  tests, obtaining and/or reviewing a separately obtained history, performing a medically appropriate examination and/or evaluation , counseling and educating the patient/family/caregiver, ordering medications, tests, or procedures, documenting information in the medical record, and care coordination  Follow Up     Return in about 3 months (around 7/5/2024) for HTN/COPD.  Patient was given instructions and counseling regarding her condition or for health maintenance advice. Please see specific information pulled into the AVS if appropriate.

## 2024-04-06 LAB
ALBUMIN SERPL-MCNC: 4 G/DL (ref 3.5–5.2)
ALBUMIN/GLOB SERPL: 1.3 G/DL
ALP SERPL-CCNC: 65 U/L (ref 39–117)
ALT SERPL W P-5'-P-CCNC: 17 U/L (ref 1–33)
ANION GAP SERPL CALCULATED.3IONS-SCNC: 9.3 MMOL/L (ref 5–15)
AST SERPL-CCNC: 20 U/L (ref 1–32)
BILIRUB SERPL-MCNC: 0.4 MG/DL (ref 0–1.2)
BUN SERPL-MCNC: 35 MG/DL (ref 8–23)
BUN/CREAT SERPL: 30.4 (ref 7–25)
CALCIUM SPEC-SCNC: 10.4 MG/DL (ref 8.6–10.5)
CHLORIDE SERPL-SCNC: 101 MMOL/L (ref 98–107)
CHOLEST SERPL-MCNC: 172 MG/DL (ref 0–200)
CO2 SERPL-SCNC: 26.7 MMOL/L (ref 22–29)
CREAT SERPL-MCNC: 1.15 MG/DL (ref 0.57–1)
EGFRCR SERPLBLD CKD-EPI 2021: 52.3 ML/MIN/1.73
GLOBULIN UR ELPH-MCNC: 3.2 GM/DL
GLUCOSE SERPL-MCNC: 117 MG/DL (ref 65–99)
HDLC SERPL-MCNC: 34 MG/DL (ref 40–60)
LDLC SERPL CALC-MCNC: 113 MG/DL (ref 0–100)
LDLC/HDLC SERPL: 3.25 {RATIO}
POTASSIUM SERPL-SCNC: 4.3 MMOL/L (ref 3.5–5.2)
PROT SERPL-MCNC: 7.2 G/DL (ref 6–8.5)
SODIUM SERPL-SCNC: 137 MMOL/L (ref 136–145)
TRIGL SERPL-MCNC: 137 MG/DL (ref 0–150)
TSH SERPL DL<=0.05 MIU/L-ACNC: 1.36 UIU/ML (ref 0.27–4.2)
VLDLC SERPL-MCNC: 25 MG/DL (ref 5–40)

## 2024-04-08 DIAGNOSIS — M51.36 DDD (DEGENERATIVE DISC DISEASE), LUMBAR: ICD-10-CM

## 2024-04-08 NOTE — TELEPHONE ENCOUNTER
Caller: Mona Sheffield    Relationship: Self    Best call back number: [phone]     Requested Prescriptions:   Requested Prescriptions     Pending Prescriptions Disp Refills    oxyCODONE-acetaminophen (PERCOCET)  MG per tablet 60 tablet 0     Sig: Take 1 tablet by mouth 2 (Two) Times a Day As Needed for Moderate Pain.        Pharmacy where request should be sent: [id number] [place] [address] AT [place] [address] - [phone] [facility] [phone]      Last office visit with prescribing clinician: 4/5/2024   Last telemedicine visit with prescribing clinician: Visit date not found   Next office visit with prescribing clinician: 7/9/2024     Additional details provided by patient:     Does the patient have less than a 3 day supply:  [x] Yes  [] No    Would you like a call back once the refill request has been completed: [] Yes [] No    If the office needs to give you a call back, can they leave a voicemail: [] Yes [] No    April Farrah Balderas Rep   04/08/24 13:54 EDT

## 2024-04-09 RX ORDER — OXYCODONE AND ACETAMINOPHEN 10; 325 MG/1; MG/1
1 TABLET ORAL 2 TIMES DAILY PRN
Qty: 60 TABLET | Refills: 0 | Status: SHIPPED | OUTPATIENT
Start: 2024-04-09

## 2024-05-05 DIAGNOSIS — I10 PRIMARY HYPERTENSION: ICD-10-CM

## 2024-05-06 RX ORDER — LUBIPROSTONE 24 UG/1
24 CAPSULE ORAL 2 TIMES DAILY WITH MEALS
Qty: 60 CAPSULE | Refills: 0 | Status: SHIPPED | OUTPATIENT
Start: 2024-05-06

## 2024-05-06 RX ORDER — CLONIDINE HYDROCHLORIDE 0.1 MG/1
0.1 TABLET ORAL 2 TIMES DAILY
Qty: 60 TABLET | Refills: 0 | Status: SHIPPED | OUTPATIENT
Start: 2024-05-06

## 2024-05-13 ENCOUNTER — TELEPHONE (OUTPATIENT)
Dept: FAMILY MEDICINE CLINIC | Facility: CLINIC | Age: 69
End: 2024-05-13
Payer: MEDICARE

## 2024-05-13 NOTE — TELEPHONE ENCOUNTER
Caller: Mona Sheffield    Relationship to patient: Self    Best call back number: [phone]    Patient is needing: PATIENT CALLED AND STATED THE PAST TWO DAYS SHE HAS BEEN URINATING AND SEEING BLOOD IN THE BOWL, SHE SAID HER BACK HIP AND STOMACH HURTS     REQUESTED CALLBACK

## 2024-05-13 NOTE — TELEPHONE ENCOUNTER
Pt stated that she is not really having any pain, but she noticed blood clots when she urinated a couple days ago and now every time she urinates there is blood. She is having some lower back discomfort. She is going to come in tomorrow morning to leave a urine sample

## 2024-05-14 ENCOUNTER — CLINICAL SUPPORT (OUTPATIENT)
Dept: FAMILY MEDICINE CLINIC | Facility: CLINIC | Age: 69
End: 2024-05-14
Payer: MEDICARE

## 2024-05-14 DIAGNOSIS — R31.9 HEMATURIA, UNSPECIFIED TYPE: Primary | ICD-10-CM

## 2024-05-14 LAB
BILIRUB BLD-MCNC: ABNORMAL MG/DL
CLARITY, POC: ABNORMAL
COLOR UR: ABNORMAL
GLUCOSE UR STRIP-MCNC: NEGATIVE MG/DL
KETONES UR QL: NEGATIVE
LEUKOCYTE EST, POC: ABNORMAL
NITRITE UR-MCNC: NEGATIVE MG/ML
PH UR: 6 [PH] (ref 5–8)
PROT UR STRIP-MCNC: ABNORMAL MG/DL
RBC # UR STRIP: ABNORMAL /UL
SP GR UR: 1.02 (ref 1–1.03)
UROBILINOGEN UR QL: NORMAL

## 2024-05-14 PROCEDURE — 87186 SC STD MICRODIL/AGAR DIL: CPT | Performed by: NURSE PRACTITIONER

## 2024-05-14 PROCEDURE — 81002 URINALYSIS NONAUTO W/O SCOPE: CPT | Performed by: NURSE PRACTITIONER

## 2024-05-14 PROCEDURE — 87077 CULTURE AEROBIC IDENTIFY: CPT | Performed by: NURSE PRACTITIONER

## 2024-05-14 PROCEDURE — 87086 URINE CULTURE/COLONY COUNT: CPT | Performed by: NURSE PRACTITIONER

## 2024-05-14 RX ORDER — CIPROFLOXACIN 500 MG/1
500 TABLET, FILM COATED ORAL 2 TIMES DAILY
Qty: 14 TABLET | Refills: 0 | Status: SHIPPED | OUTPATIENT
Start: 2024-05-14

## 2024-05-14 NOTE — PROGRESS NOTES
There is a lot of leukocytes, blood and protein in her urine.  I am going to go ahead and send in antibiotics but if this culture is normal -follow-up with urology certainly indicated

## 2024-05-14 NOTE — PROGRESS NOTES
Pt came in to leave a urine sample because she is having hematuria and lower back pain. Urine sample was collected and results sent to provider for review.     PIETRO Lagos

## 2024-05-16 LAB — BACTERIA SPEC AEROBE CULT: ABNORMAL

## 2024-05-24 DIAGNOSIS — M51.36 DDD (DEGENERATIVE DISC DISEASE), LUMBAR: ICD-10-CM

## 2024-05-24 NOTE — TELEPHONE ENCOUNTER
Caller: Mona Sheffield    Relationship: Self    Best call back number: [phone]    Requested Prescriptions:   Requested Prescriptions     Pending Prescriptions Disp Refills    oxyCODONE-acetaminophen (PERCOCET)  MG per tablet 60 tablet 0     Sig: Take 1 tablet by mouth 2 (Two) Times a Day As Needed for Moderate Pain.        Pharmacy where request should be sent: [facility] #[id number] [place] [address] AT [place] [address] - [phone]  - [phone] [employer]     Last office visit with prescribing clinician: 4/5/2024   Last telemedicine visit with prescribing clinician: Visit date not found   Next office visit with prescribing clinician: 7/9/2024     Additional details provided by patient: HAS 2 TABLETS     Does the patient have less than a 3 day supply:  [x] Yes  [] No      Farrah Jordan Rep   05/24/24 14:42 EDT

## 2024-05-26 RX ORDER — OXYCODONE AND ACETAMINOPHEN 10; 325 MG/1; MG/1
1 TABLET ORAL 2 TIMES DAILY PRN
Qty: 60 TABLET | Refills: 0 | Status: SHIPPED | OUTPATIENT
Start: 2024-05-26

## 2024-06-20 DIAGNOSIS — I10 PRIMARY HYPERTENSION: ICD-10-CM

## 2024-06-20 RX ORDER — CLONIDINE HYDROCHLORIDE 0.1 MG/1
0.1 TABLET ORAL 2 TIMES DAILY
Qty: 180 TABLET | Refills: 0 | Status: SHIPPED | OUTPATIENT
Start: 2024-06-20

## 2024-06-24 ENCOUNTER — TELEPHONE (OUTPATIENT)
Dept: FAMILY MEDICINE CLINIC | Facility: CLINIC | Age: 69
End: 2024-06-24
Payer: MEDICARE

## 2024-06-24 NOTE — TELEPHONE ENCOUNTER
Caller: Mona Sheffield    Relationship: Self    Best call back number: [phone]    What orders are you requesting (i.e. lab or imaging): REGINA    In what timeframe would the patient need to come in: ASAP    Where will you receive your lab/imaging services: OFFICE

## 2024-06-25 ENCOUNTER — CLINICAL SUPPORT (OUTPATIENT)
Dept: FAMILY MEDICINE CLINIC | Facility: CLINIC | Age: 69
End: 2024-06-25
Payer: MEDICARE

## 2024-06-25 DIAGNOSIS — R30.0 DYSURIA: Primary | ICD-10-CM

## 2024-06-25 LAB
BILIRUB BLD-MCNC: NEGATIVE MG/DL
CLARITY, POC: ABNORMAL
COLOR UR: YELLOW
GLUCOSE UR STRIP-MCNC: NEGATIVE MG/DL
KETONES UR QL: NEGATIVE
LEUKOCYTE EST, POC: ABNORMAL
NITRITE UR-MCNC: NEGATIVE MG/ML
PH UR: 5.5 [PH] (ref 5–8)
PROT UR STRIP-MCNC: ABNORMAL MG/DL
RBC # UR STRIP: ABNORMAL /UL
SP GR UR: 1.01 (ref 1–1.03)
UROBILINOGEN UR QL: NORMAL

## 2024-06-25 PROCEDURE — 87086 URINE CULTURE/COLONY COUNT: CPT | Performed by: NURSE PRACTITIONER

## 2024-06-25 PROCEDURE — 81002 URINALYSIS NONAUTO W/O SCOPE: CPT | Performed by: NURSE PRACTITIONER

## 2024-06-25 RX ORDER — LEVOFLOXACIN 500 MG/1
500 TABLET, FILM COATED ORAL DAILY
Qty: 7 TABLET | Refills: 0 | Status: SHIPPED | OUTPATIENT
Start: 2024-06-25

## 2024-06-25 NOTE — PROGRESS NOTES
Patient came in today to leave a urine sample due to UTI symptoms. Urine sample was collected and results sent to provider for review.    PIETRO Lagos

## 2024-06-25 NOTE — PROGRESS NOTES
Sending in Levaquin for patient to start taking but this is a second infection in 2 months that she has had.  When does patient follow-up with urology?

## 2024-06-26 NOTE — PROGRESS NOTES
No growth seen on urine culture.  She can continue the antibiotic but if symptoms return, will need follow-up with urology to determine cause for symptoms

## 2024-06-27 LAB — BACTERIA SPEC AEROBE CULT: NORMAL

## 2024-07-09 ENCOUNTER — OFFICE VISIT (OUTPATIENT)
Dept: FAMILY MEDICINE CLINIC | Facility: CLINIC | Age: 69
End: 2024-07-09
Payer: MEDICARE

## 2024-07-09 VITALS
DIASTOLIC BLOOD PRESSURE: 80 MMHG | HEART RATE: 81 BPM | SYSTOLIC BLOOD PRESSURE: 142 MMHG | OXYGEN SATURATION: 98 % | WEIGHT: 168 LBS | BODY MASS INDEX: 27 KG/M2 | HEIGHT: 66 IN

## 2024-07-09 DIAGNOSIS — E78.2 MIXED HYPERLIPIDEMIA: ICD-10-CM

## 2024-07-09 DIAGNOSIS — R33.9 URINARY RETENTION: ICD-10-CM

## 2024-07-09 DIAGNOSIS — Z12.31 SCREENING MAMMOGRAM FOR BREAST CANCER: ICD-10-CM

## 2024-07-09 DIAGNOSIS — R07.9 CHEST PAIN, UNSPECIFIED TYPE: ICD-10-CM

## 2024-07-09 DIAGNOSIS — M25.551 PAIN OF BOTH HIP JOINTS: ICD-10-CM

## 2024-07-09 DIAGNOSIS — I10 PRIMARY HYPERTENSION: Primary | ICD-10-CM

## 2024-07-09 DIAGNOSIS — J44.9 CHRONIC OBSTRUCTIVE PULMONARY DISEASE, UNSPECIFIED COPD TYPE: ICD-10-CM

## 2024-07-09 DIAGNOSIS — F41.9 ANXIETY AND DEPRESSION: ICD-10-CM

## 2024-07-09 DIAGNOSIS — M51.36 DDD (DEGENERATIVE DISC DISEASE), LUMBAR: ICD-10-CM

## 2024-07-09 DIAGNOSIS — R73.03 PREDIABETES: ICD-10-CM

## 2024-07-09 DIAGNOSIS — M25.552 PAIN OF BOTH HIP JOINTS: ICD-10-CM

## 2024-07-09 DIAGNOSIS — K59.09 CHRONIC CONSTIPATION: ICD-10-CM

## 2024-07-09 DIAGNOSIS — G47.33 OSA (OBSTRUCTIVE SLEEP APNEA): ICD-10-CM

## 2024-07-09 DIAGNOSIS — Z78.0 ASYMPTOMATIC MENOPAUSE: ICD-10-CM

## 2024-07-09 DIAGNOSIS — F32.A ANXIETY AND DEPRESSION: ICD-10-CM

## 2024-07-09 DIAGNOSIS — Z13.820 ENCOUNTER FOR SCREENING FOR OSTEOPOROSIS: ICD-10-CM

## 2024-07-09 PROCEDURE — 99214 OFFICE O/P EST MOD 30 MIN: CPT | Performed by: NURSE PRACTITIONER

## 2024-07-09 PROCEDURE — 1159F MED LIST DOCD IN RCRD: CPT | Performed by: NURSE PRACTITIONER

## 2024-07-09 PROCEDURE — 36415 COLL VENOUS BLD VENIPUNCTURE: CPT | Performed by: NURSE PRACTITIONER

## 2024-07-09 PROCEDURE — 1126F AMNT PAIN NOTED NONE PRSNT: CPT | Performed by: NURSE PRACTITIONER

## 2024-07-09 PROCEDURE — 80048 BASIC METABOLIC PNL TOTAL CA: CPT | Performed by: NURSE PRACTITIONER

## 2024-07-09 PROCEDURE — 3079F DIAST BP 80-89 MM HG: CPT | Performed by: NURSE PRACTITIONER

## 2024-07-09 PROCEDURE — 1160F RVW MEDS BY RX/DR IN RCRD: CPT | Performed by: NURSE PRACTITIONER

## 2024-07-09 PROCEDURE — 3077F SYST BP >= 140 MM HG: CPT | Performed by: NURSE PRACTITIONER

## 2024-07-09 RX ORDER — ALBUTEROL SULFATE 2.5 MG/3ML
2.5 SOLUTION RESPIRATORY (INHALATION) EVERY 4 HOURS PRN
Qty: 180 ML | Refills: 0 | Status: SHIPPED | OUTPATIENT
Start: 2024-07-09

## 2024-07-09 RX ORDER — LIDOCAINE 5 G/100G
CREAM RECTAL; TOPICAL
Qty: 60 G | Refills: 1 | Status: SHIPPED | OUTPATIENT
Start: 2024-07-09

## 2024-07-09 RX ORDER — OXYCODONE AND ACETAMINOPHEN 10; 325 MG/1; MG/1
1 TABLET ORAL 2 TIMES DAILY PRN
Qty: 60 TABLET | Refills: 0 | Status: SHIPPED | OUTPATIENT
Start: 2024-07-09

## 2024-07-09 RX ORDER — TRIAMCINOLONE ACETONIDE 1 MG/G
1 CREAM TOPICAL 2 TIMES DAILY
Qty: 60 G | Refills: 1 | Status: SHIPPED | OUTPATIENT
Start: 2024-07-09

## 2024-07-09 NOTE — PROGRESS NOTES
Venipuncture Blood Specimen Collection  Venipuncture performed in the right arm by Marianela Flores MA with good hemostasis. Patient tolerated the procedure well without complications.   07/09/24   Marianela Flores MA

## 2024-07-09 NOTE — PROGRESS NOTES
"Chief Complaint  Follow-up (3 month follow up HTN/COPD )    Subjective        Mona Sheffield presents to [facility]  History of Present Illness    Patient presents for f/u visit.      Patent has COPD, prescribed Breztri and albuterol PRN dyspnea. She reports symptoms are stable, has no acute complaints.  Patient has MURTAZA, previously reported that she was tolerating her CPAP poorly - returned machine.      Patient has hypertension, taking Clonidine 0.1 mg BID, Hydralazine 25 mg TID, Hyzaar daily and Clonidine BID.   She is taking Lipitor 40 mg daily. Patient followed by Cardiology - referred last year for further evaluation for chest pain.  Stress test and echocardiogram were ordered.  Stress test was negative for ischemia.  Echocardiogram showed normal left ventricular systolic function, diastolic function consistent with pseudonormalization and there was dilation of the left atrial cavity. Pacemaker recommended. Patient has not followed up with Cardiology, does not want a pacemaker.      Patient referred to Urology due to recurrent UTI. She reported having episodes where she spontaneously empties her bladder. Patient evaluated, advised a bladder stimulator was needed. She is currently using straight catheters. Patient was treated for UTI on 6/25 - sx improving.      Depression and anxiety, stable on Zoloft 150 mg daily.      Hx DDD, also had generalized joint pain. Patient takes Percocet PRN severe pain. She does take Diclofenac PRN joint pain. Patient does apply Lidocaine cream nightly to low back.      Chronic constipation, prescribed Amitiza but not taking. She has increased fruit/yogurt intake and is taking Miralax daily. Sx are stable.     Objective   Vital Signs:  /80 (BP Location: Left arm, Patient Position: Sitting, Cuff Size: Adult)   Pulse 81   Ht 167.6 cm (66\")   Wt 76.2 kg (168 lb)   SpO2 98%   BMI 27.12 kg/m²   Estimated body mass index is 27.12 kg/m² as " "calculated from the following:    Height as of this encounter: 167.6 cm (66\").    Weight as of this encounter: 76.2 kg (168 lb).             Physical Exam  Constitutional:       Appearance: Normal appearance.   HENT:      Head: Normocephalic.   Cardiovascular:      Rate and Rhythm: Normal rate and regular rhythm.   Pulmonary:      Effort: Pulmonary effort is normal.      Breath sounds: Normal breath sounds.   Abdominal:      General: Abdomen is flat. Bowel sounds are normal.      Palpations: Abdomen is soft.   Musculoskeletal:         General: Normal range of motion.      Cervical back: Neck supple.      Right lower leg: No edema.      Left lower leg: No edema.   Skin:     General: Skin is warm and dry.   Neurological:      Mental Status: She is alert and oriented to person, place, and time.      Gait: Gait is intact.   Psychiatric:         Attention and Perception: Attention normal.         Mood and Affect: Mood normal.         Speech: Speech normal.        Result Review :      CMP          2/29/2024    04:55 3/1/2024    05:14 4/5/2024    13:46   CMP   Glucose 118  111  117    BUN 14  14  35    Creatinine 0.81  0.89  1.15    EGFR 79.7  71.2  52.3    Sodium 141  141  137    Potassium 3.6  3.4  4.3    Chloride 104  103  101    Calcium 9.0  9.0  10.4    Total Protein 6.0  5.8  7.2    Albumin 3.9  3.7  4.0    Globulin 2.1  2.1  3.2    Total Bilirubin 0.6  0.5  0.4    Alkaline Phosphatase 86  75  65    AST (SGOT) 32  22  20    ALT (SGPT) 46  33  17    Albumin/Globulin Ratio 1.9  1.8  1.3    BUN/Creatinine Ratio 17.3  15.7  30.4    Anion Gap 9.0  10.0  9.3      CBC          2/29/2024    04:55 3/1/2024    05:14 4/5/2024    13:46   CBC   WBC 6.70  6.90  8.29    RBC 4.18  4.13  4.62    Hemoglobin 12.3  12.1  13.7    Hematocrit 36.6  36.4  41.1    MCV 87.6  88.1  89.0    MCH 29.5  29.2  29.7    MCHC 33.7  33.2  33.3    RDW 13.6  13.6  13.2    Platelets 119  113  179      Lipid Panel          4/5/2024    13:46   Lipid Panel "   Total Cholesterol 172    Triglycerides 137    HDL Cholesterol 34    VLDL Cholesterol 25    LDL Cholesterol  113    LDL/HDL Ratio 3.25      TSH          4/5/2024    13:46   TSH   TSH 1.360      Most Recent A1C          4/5/2024    13:46   HGBA1C Most Recent   Hemoglobin A1C 6.00                   Assessment and Plan     Diagnoses and all orders for this visit:    1. Primary hypertension (Primary)  Assessment & Plan:  Continue medications as prescribed      Orders:  -     Basic Metabolic Panel    2. DDD (degenerative disc disease), lumbar  Assessment & Plan:  Percocet as needed for severe pain    Orders:  -     diclofenac (VOLTAREN) 50 MG EC tablet; Take 1 tablet by mouth 2 (Two) Times a Day As Needed (joint pain).  Dispense: 60 tablet; Refill: 1  -     oxyCODONE-acetaminophen (PERCOCET)  MG per tablet; Take 1 tablet by mouth 2 (Two) Times a Day As Needed for Moderate Pain.  Dispense: 60 tablet; Refill: 0    3. Chronic obstructive pulmonary disease, unspecified COPD type  Assessment & Plan:  Continue Breztri 2 puffs BID  Albuterol PRN      Orders:  -     albuterol (PROVENTIL) (2.5 MG/3ML) 0.083% nebulizer solution; Take 2.5 mg by nebulization Every 4 (Four) Hours As Needed for Wheezing.  Dispense: 180 mL; Refill: 0    4. Anxiety and depression  Assessment & Plan:  Stable on Zoloft 150 mg daily      5. Urinary retention  Assessment & Plan:  Patient will see Urology Friday  Straight cath PRN      6. Prediabetes  Assessment & Plan:  Limit concentrated sweets and carbohydrates      7. Mixed hyperlipidemia  Assessment & Plan:  Labs reviewed  Continue Lipitor as prescribed  Healthy heart diet      8. Screening mammogram for breast cancer  -     Mammo Screening Digital Tomosynthesis Bilateral With CAD; Future    9. Encounter for screening for osteoporosis  -     DEXA Bone Density Axial; Future    10. Asymptomatic menopause  -     DEXA Bone Density Axial; Future    11. Pain of both hip joints  -     diclofenac  (VOLTAREN) 50 MG EC tablet; Take 1 tablet by mouth 2 (Two) Times a Day As Needed (joint pain).  Dispense: 60 tablet; Refill: 1    12. MURTAZA (obstructive sleep apnea)  Assessment & Plan:  Untreated       13. Chest pain, unspecified type  Assessment & Plan:  Follow up with Cardiology recommended      14. Chronic constipation  Assessment & Plan:  Stable on Miralax PRN      Other orders  -     triamcinolone (KENALOG) 0.1 % cream; Apply 1 Application topically to the appropriate area as directed 2 (Two) Times a Day.  Dispense: 60 g; Refill: 1  -     Lidocaine, Anorectal, (LMX 5) 5 % cream cream; Apply nightly as needed.  Dispense: 60 g; Refill: 1           I spent 30 minutes caring for Mona on this date of service. This time includes time spent by me in the following activities:preparing for the visit, reviewing tests, obtaining and/or reviewing a separately obtained history, performing a medically appropriate examination and/or evaluation , counseling and educating the patient/family/caregiver, ordering medications, tests, or procedures, documenting information in the medical record, and care coordination  Follow Up     Return in about 4 months (around 11/9/2024) for COPD.  Patient was given instructions and counseling regarding her condition or for health maintenance advice. Please see specific information pulled into the AVS if appropriate.

## 2024-07-10 LAB
ANION GAP SERPL CALCULATED.3IONS-SCNC: 11.2 MMOL/L (ref 5–15)
BUN SERPL-MCNC: 19 MG/DL (ref 8–23)
BUN/CREAT SERPL: 18.8 (ref 7–25)
CALCIUM SPEC-SCNC: 9.5 MG/DL (ref 8.6–10.5)
CHLORIDE SERPL-SCNC: 101 MMOL/L (ref 98–107)
CO2 SERPL-SCNC: 24.8 MMOL/L (ref 22–29)
CREAT SERPL-MCNC: 1.01 MG/DL (ref 0.57–1)
EGFRCR SERPLBLD CKD-EPI 2021: 61.1 ML/MIN/1.73
GLUCOSE SERPL-MCNC: 116 MG/DL (ref 65–99)
POTASSIUM SERPL-SCNC: 4.3 MMOL/L (ref 3.5–5.2)
SODIUM SERPL-SCNC: 137 MMOL/L (ref 136–145)

## 2024-07-26 DIAGNOSIS — I10 PRIMARY HYPERTENSION: ICD-10-CM

## 2024-07-26 RX ORDER — CLONIDINE HYDROCHLORIDE 0.1 MG/1
0.1 TABLET ORAL 2 TIMES DAILY
Qty: 180 TABLET | Refills: 0 | Status: SHIPPED | OUTPATIENT
Start: 2024-07-26

## 2024-08-23 DIAGNOSIS — M51.36 DDD (DEGENERATIVE DISC DISEASE), LUMBAR: ICD-10-CM

## 2024-08-23 RX ORDER — OXYCODONE AND ACETAMINOPHEN 10; 325 MG/1; MG/1
1 TABLET ORAL 2 TIMES DAILY PRN
Qty: 60 TABLET | Refills: 0 | Status: SHIPPED | OUTPATIENT
Start: 2024-08-23

## 2024-08-23 NOTE — TELEPHONE ENCOUNTER
Caller: Teena Sheffieldany PELAYO    Relationship: Self    Best call back number: [phone]   Requested Prescriptions:   Requested Prescriptions     Pending Prescriptions Disp Refills    oxyCODONE-acetaminophen (PERCOCET)  MG per tablet 60 tablet 0     Sig: Take 1 tablet by mouth 2 (Two) Times a Day As Needed for Moderate Pain.        [id number] [place] [address] AT [place] [address] - [phone] [facility] [phone]      Last office visit with prescribing clinician: 7/9/2024   Last telemedicine visit with prescribing clinician: Visit date not found   Next office visit with prescribing clinician: 11/15/2024     Additional details provided by patient: PATIENT HAS TWO TABLETS REMAINING     Does the patient have less than a 3 day supply:  [x] Yes  [] No    Would you like a call back once the refill request has been completed: [] Yes [] No    If the office needs to give you a call back, can they leave a voicemail: [] Yes [] No    Farrah Bowie Rep   08/23/24 14:22 EDT

## 2024-09-09 RX ORDER — LOSARTAN POTASSIUM AND HYDROCHLOROTHIAZIDE 25; 100 MG/1; MG/1
1 TABLET ORAL DAILY
Qty: 90 TABLET | Refills: 0 | Status: SHIPPED | OUTPATIENT
Start: 2024-09-09

## 2024-09-09 RX ORDER — SERTRALINE HYDROCHLORIDE 100 MG/1
TABLET, FILM COATED ORAL
Qty: 135 TABLET | Refills: 0 | Status: SHIPPED | OUTPATIENT
Start: 2024-09-09

## 2024-10-04 ENCOUNTER — TELEPHONE (OUTPATIENT)
Dept: FAMILY MEDICINE CLINIC | Facility: CLINIC | Age: 69
End: 2024-10-04
Payer: MEDICARE

## 2024-10-04 NOTE — TELEPHONE ENCOUNTER
Pt. Called would like a stronger albuterol inhaler for her wheezing. She also needs her percocet refilled please.

## 2024-10-07 DIAGNOSIS — M51.369 DDD (DEGENERATIVE DISC DISEASE), LUMBAR: ICD-10-CM

## 2024-10-07 RX ORDER — OXYCODONE AND ACETAMINOPHEN 10; 325 MG/1; MG/1
1 TABLET ORAL 2 TIMES DAILY PRN
Qty: 60 TABLET | Refills: 0 | Status: SHIPPED | OUTPATIENT
Start: 2024-10-07

## 2024-10-07 RX ORDER — OXYCODONE AND ACETAMINOPHEN 10; 325 MG/1; MG/1
1 TABLET ORAL 2 TIMES DAILY PRN
Qty: 60 TABLET | Refills: 0 | OUTPATIENT
Start: 2024-10-07

## 2024-10-07 RX ORDER — ALBUTEROL SULFATE 90 UG/1
2 INHALANT RESPIRATORY (INHALATION) EVERY 6 HOURS PRN
Qty: 18 G | Refills: 0 | Status: SHIPPED | OUTPATIENT
Start: 2024-10-07

## 2024-10-07 NOTE — TELEPHONE ENCOUNTER
Spoke with patient and made her aware. She is scheduled 11/15 and will plan to discuss at that time. She requested a refill to get her inhaler to last until she comes in at that time.

## 2024-10-07 NOTE — TELEPHONE ENCOUNTER
Sent a refill request on patient's pain medication in a separate message. She is requesting something stronger than her albuterol inhaler for he wheezing. She said it seems to be worse this time of year. Please advise.

## 2024-10-07 NOTE — TELEPHONE ENCOUNTER
Pt is almost out of her current inhaler because she has needed it more this time of year. Asking to get it refilled .

## 2024-11-15 ENCOUNTER — OFFICE VISIT (OUTPATIENT)
Dept: FAMILY MEDICINE CLINIC | Facility: CLINIC | Age: 69
End: 2024-11-15
Payer: MEDICARE

## 2024-11-15 VITALS
WEIGHT: 172 LBS | SYSTOLIC BLOOD PRESSURE: 148 MMHG | HEART RATE: 68 BPM | BODY MASS INDEX: 27.76 KG/M2 | DIASTOLIC BLOOD PRESSURE: 92 MMHG | OXYGEN SATURATION: 94 %

## 2024-11-15 DIAGNOSIS — Z12.31 SCREENING MAMMOGRAM FOR BREAST CANCER: ICD-10-CM

## 2024-11-15 DIAGNOSIS — R82.90 CLOUDY URINE: ICD-10-CM

## 2024-11-15 DIAGNOSIS — R33.9 URINARY RETENTION: ICD-10-CM

## 2024-11-15 DIAGNOSIS — L25.9 CONTACT DERMATITIS, UNSPECIFIED CONTACT DERMATITIS TYPE, UNSPECIFIED TRIGGER: ICD-10-CM

## 2024-11-15 DIAGNOSIS — J44.9 CHRONIC OBSTRUCTIVE PULMONARY DISEASE, UNSPECIFIED COPD TYPE: ICD-10-CM

## 2024-11-15 DIAGNOSIS — F32.A ANXIETY AND DEPRESSION: ICD-10-CM

## 2024-11-15 DIAGNOSIS — G47.00 INSOMNIA, UNSPECIFIED TYPE: ICD-10-CM

## 2024-11-15 DIAGNOSIS — M51.369 DDD (DEGENERATIVE DISC DISEASE), LUMBAR: ICD-10-CM

## 2024-11-15 DIAGNOSIS — I10 PRIMARY HYPERTENSION: ICD-10-CM

## 2024-11-15 DIAGNOSIS — F41.9 ANXIETY AND DEPRESSION: ICD-10-CM

## 2024-11-15 DIAGNOSIS — E78.2 MIXED HYPERLIPIDEMIA: ICD-10-CM

## 2024-11-15 DIAGNOSIS — Z51.81 ENCOUNTER FOR THERAPEUTIC DRUG LEVEL MONITORING: ICD-10-CM

## 2024-11-15 DIAGNOSIS — M51.369 DEGENERATION OF INTERVERTEBRAL DISC OF LUMBAR REGION WITHOUT DISCOGENIC BACK PAIN OR LOWER EXTREMITY PAIN: Primary | ICD-10-CM

## 2024-11-15 DIAGNOSIS — R73.03 PREDIABETES: ICD-10-CM

## 2024-11-15 LAB
AMPHET+METHAMPHET UR QL: NEGATIVE
AMPHETAMINES UR QL: NEGATIVE
BARBITURATES UR QL SCN: NEGATIVE
BENZODIAZ UR QL SCN: POSITIVE
BILIRUB BLD-MCNC: NEGATIVE MG/DL
BUPRENORPHINE SERPL-MCNC: NEGATIVE NG/ML
CANNABINOIDS SERPL QL: POSITIVE
CLARITY, POC: ABNORMAL
COCAINE UR QL: NEGATIVE
COLOR UR: YELLOW
GLUCOSE UR STRIP-MCNC: NEGATIVE MG/DL
KETONES UR QL: ABNORMAL
LEUKOCYTE EST, POC: ABNORMAL
METHADONE UR QL SCN: NEGATIVE
NITRITE UR-MCNC: NEGATIVE MG/ML
OPIATES UR QL: NEGATIVE
OXYCODONE UR QL SCN: NEGATIVE
PCP UR QL SCN: NEGATIVE
PH UR: 5.5 [PH] (ref 5–8)
PROT UR STRIP-MCNC: ABNORMAL MG/DL
RBC # UR STRIP: ABNORMAL /UL
SP GR UR: 1.02 (ref 1–1.03)
TRICYCLICS UR QL SCN: NEGATIVE
UROBILINOGEN UR QL: ABNORMAL

## 2024-11-15 PROCEDURE — 87077 CULTURE AEROBIC IDENTIFY: CPT | Performed by: NURSE PRACTITIONER

## 2024-11-15 PROCEDURE — 80306 DRUG TEST PRSMV INSTRMNT: CPT | Performed by: NURSE PRACTITIONER

## 2024-11-15 PROCEDURE — 87086 URINE CULTURE/COLONY COUNT: CPT | Performed by: NURSE PRACTITIONER

## 2024-11-15 PROCEDURE — 87186 SC STD MICRODIL/AGAR DIL: CPT | Performed by: NURSE PRACTITIONER

## 2024-11-15 RX ORDER — TRIAMCINOLONE ACETONIDE 1 MG/G
1 CREAM TOPICAL 2 TIMES DAILY
Qty: 60 G | Refills: 1 | Status: SHIPPED | OUTPATIENT
Start: 2024-11-15

## 2024-11-15 RX ORDER — CLONIDINE HYDROCHLORIDE 0.1 MG/1
0.1 TABLET ORAL 2 TIMES DAILY
Qty: 180 TABLET | Refills: 0 | Status: SHIPPED | OUTPATIENT
Start: 2024-11-15

## 2024-11-15 RX ORDER — OXYCODONE AND ACETAMINOPHEN 10; 325 MG/1; MG/1
1 TABLET ORAL 2 TIMES DAILY PRN
Qty: 60 TABLET | Refills: 0 | Status: SHIPPED | OUTPATIENT
Start: 2024-11-15

## 2024-11-15 RX ORDER — CIPROFLOXACIN 500 MG/1
500 TABLET, FILM COATED ORAL 2 TIMES DAILY
Qty: 14 TABLET | Refills: 0 | Status: SHIPPED | OUTPATIENT
Start: 2024-11-15

## 2024-11-15 RX ORDER — LIDOCAINE 50 MG/G
1 PATCH TOPICAL DAILY
Qty: 30 PATCH | Refills: 0 | Status: SHIPPED | OUTPATIENT
Start: 2024-11-15 | End: 2024-12-15

## 2024-11-15 RX ORDER — TRAZODONE HYDROCHLORIDE 50 MG/1
50 TABLET, FILM COATED ORAL NIGHTLY
Qty: 30 TABLET | Refills: 1 | Status: SHIPPED | OUTPATIENT
Start: 2024-11-15

## 2024-11-15 NOTE — PROGRESS NOTES
Chief Complaint  Follow-up and COPD    Subjective        Mona Sheffield presents to [facility]  History of Present Illness    Patient presents for f/u visit.      Patent has COPD, prescribed Breztri and albuterol PRN dyspnea. She reports symptoms are stable, has no acute complaints.  Patient has MURTAZA, previously reported that she was tolerating her CPAP poorly - returned machine.      Patient has hypertension, taking Clonidine 0.1 mg BID, Hydralazine 25 mg TID, Hyzaar daily - took immediately prior to visit.   She is taking Lipitor 40 mg daily. Patient followed by Cardiology - referred last year for further evaluation for chest pain.  Stress test and echocardiogram were ordered.  Stress test was negative for ischemia.  Echocardiogram showed normal left ventricular systolic function, diastolic function consistent with pseudonormalization and there was dilation of the left atrial cavity. Pacemaker recommended. Patient has not followed up with Cardiology, did not want a pacemaker but is having increasing palpitations.      Patient referred to Urology due to recurrent UTI. She reported having episodes where she spontaneously empties her bladder. Patient evaluated, advised a bladder stimulator was needed. She is currently using straight catheters.  Patient describes pressure and states she feels swollen after straight cathing this morning.  Patient also reports urine is cloudy.     Depression and anxiety, stable on Zoloft 150 mg daily. Patient is having difficulty falling asleep, failed Hydroxyzine due to hallucinations.      Hx DDD, also had generalized joint pain. Patient takes Percocet PRN severe pain. She does take Diclofenac PRN joint pain. Patient does apply Lidocaine cream nightly to low back.        Objective   Vital Signs:  /92 (BP Location: Left arm, Patient Position: Sitting, Cuff Size: Adult)   Pulse 68   Wt 78 kg (172 lb)   SpO2 94%   BMI 27.76 kg/m²   Estimated body  "mass index is 27.76 kg/m² as calculated from the following:    Height as of 7/9/24: 167.6 cm (66\").    Weight as of this encounter: 78 kg (172 lb).    BMI is >= 25 and <30. (Overweight) The following options were offered after discussion;: exercise counseling/recommendations and nutrition counseling/recommendations      Physical Exam  Constitutional:       Appearance: Normal appearance.   HENT:      Head: Normocephalic.   Cardiovascular:      Rate and Rhythm: Normal rate and regular rhythm.   Pulmonary:      Effort: Pulmonary effort is normal.      Breath sounds: Normal breath sounds.   Abdominal:      General: Abdomen is flat. Bowel sounds are normal.      Palpations: Abdomen is soft.   Musculoskeletal:         General: Normal range of motion.      Cervical back: Neck supple.      Right lower leg: No edema.      Left lower leg: No edema.   Skin:     General: Skin is warm and dry.   Neurological:      Mental Status: She is alert and oriented to person, place, and time.      Gait: Gait is intact.   Psychiatric:         Attention and Perception: Attention normal.         Mood and Affect: Mood normal.         Speech: Speech normal.        Result Review :    CMP          3/1/2024    05:14 4/5/2024    13:46 7/9/2024    14:33   CMP   Glucose 111  117  116    BUN 14  35  19    Creatinine 0.89  1.15  1.01    EGFR 71.2  52.3  61.1    Sodium 141  137  137    Potassium 3.4  4.3  4.3    Chloride 103  101  101    Calcium 9.0  10.4  9.5    Total Protein 5.8  7.2     Albumin 3.7  4.0     Globulin 2.1  3.2     Total Bilirubin 0.5  0.4     Alkaline Phosphatase 75  65     AST (SGOT) 22  20     ALT (SGPT) 33  17     Albumin/Globulin Ratio 1.8  1.3     BUN/Creatinine Ratio 15.7  30.4  18.8    Anion Gap 10.0  9.3  11.2      CBC          2/29/2024    04:55 3/1/2024    05:14 4/5/2024    13:46   CBC   WBC 6.70  6.90  8.29    RBC 4.18  4.13  4.62    Hemoglobin 12.3  12.1  13.7    Hematocrit 36.6  36.4  41.1    MCV 87.6  88.1  89.0    MCH " 29.5  29.2  29.7    MCHC 33.7  33.2  33.3    RDW 13.6  13.6  13.2    Platelets 119  113  179      Lipid Panel          4/5/2024    13:46   Lipid Panel   Total Cholesterol 172    Triglycerides 137    HDL Cholesterol 34    VLDL Cholesterol 25    LDL Cholesterol  113    LDL/HDL Ratio 3.25      TSH          4/5/2024    13:46   TSH   TSH 1.360      Most Recent A1C          4/5/2024    13:46   HGBA1C Most Recent   Hemoglobin A1C 6.00                Assessment and Plan   Diagnoses and all orders for this visit:    1. Degeneration of intervertebral disc of lumbar region without discogenic back pain or lower extremity pain (Primary)  Assessment & Plan:  Update UDS  Continue Percocet PRN pain    Orders:  -     Urine Drug Screen - Urine, Clean Catch; Future  -     Urine Drug Screen - Urine, Clean Catch  -     lidocaine (LIDODERM) 5 %; Place 1 patch on the skin as directed by provider Daily for 30 days. Remove & Discard patch within 12 hours or as directed by MD  Dispense: 30 patch; Refill: 0    2. Primary hypertension  Assessment & Plan:  Hypertension is stable and controlled  Continue current treatment regimen.  Blood pressure will be reassessed in 6 months.    Orders:  -     cloNIDine (CATAPRES) 0.1 MG tablet; Take 1 tablet by mouth 2 (Two) Times a Day.  Dispense: 180 tablet; Refill: 0    3. Chronic obstructive pulmonary disease, unspecified COPD type  Assessment & Plan:  COPD is stable.    Plan:  Continue same medication/s without change.    Discussed medication dosage, use, side effects, and goals of treatment in detail.    Warning signs of respiratory distress were reviewed with the patient. .    Patient Treatment Goals:   symptom prevention, minimizing limitation in activity, prevention of exacerbations and use of ER/inpatient care, maintenance of optimal pulmonary function, and minimization of adverse effects of treatment    Followup in 6 months.      4. Anxiety and depression  Assessment & Plan:  Patient's depression  is a recurrent episode that is mild without psychosis. Depression is in partial remission and stable.    Plan:   Continue current medication therapy     Followup in 6 months.       5. Mixed hyperlipidemia  Assessment & Plan:   Lipid abnormalities are stable    Plan:  Continue same medication/s without change.      Discussed medication dosage, use, side effects, and goals of treatment in detail.    Counseled patient on lifestyle modifications to help control hyperlipidemia.     Patient Treatment Goals:   LDL goal is under 130    Followup in 6 months.      6. Prediabetes  Assessment & Plan:  ADA diet      7. Urinary retention  Assessment & Plan:  Straight cath PRN  Follow up with Urology    Orders:  -     POC Urinalysis Dipstick  -     Urine Culture - Urine, Urine, Clean Catch; Future  -     Urine Culture - Urine, Urine, Clean Catch    8. Screening mammogram for breast cancer  -     Mammo Screening Digital Tomosynthesis Bilateral With CAD; Future    9. Encounter for therapeutic drug level monitoring  -     Urine Drug Screen - Urine, Clean Catch; Future  -     Urine Drug Screen - Urine, Clean Catch    10. Insomnia, unspecified type  Assessment & Plan:  Start Trazodone 50 mg nightly    Orders:  -     traZODone (DESYREL) 50 MG tablet; Take 1 tablet by mouth Every Night.  Dispense: 30 tablet; Refill: 1    11. Cloudy urine  -     ciprofloxacin (Cipro) 500 MG tablet; Take 1 tablet by mouth 2 (Two) Times a Day.  Dispense: 14 tablet; Refill: 0    12. Contact dermatitis, unspecified contact dermatitis type, unspecified trigger  -     triamcinolone (KENALOG) 0.1 % cream; Apply 1 Application topically to the appropriate area as directed 2 (Two) Times a Day.  Dispense: 60 g; Refill: 1    13. DDD (degenerative disc disease), lumbar  Assessment & Plan:  Update UDS  Continue Percocet PRN pain    Orders:  -     oxyCODONE-acetaminophen (PERCOCET)  MG per tablet; Take 1 tablet by mouth 2 (Two) Times a Day As Needed for Moderate  Pain.  Dispense: 60 tablet; Refill: 0    Other orders  -     Fluzone High-Dose 65+yrs (7955-1587)           I spent 30 minutes caring for Mona on this date of service. This time includes time spent by me in the following activities:preparing for the visit, reviewing tests, obtaining and/or reviewing a separately obtained history, performing a medically appropriate examination and/or evaluation , counseling and educating the patient/family/caregiver, ordering medications, tests, or procedures, documenting information in the medical record, and care coordination  Follow Up   Return in about 4 months (around 3/15/2025) for Medicare Wellness.  Patient was given instructions and counseling regarding her condition or for health maintenance advice. Please see specific information pulled into the AVS if appropriate.

## 2024-11-15 NOTE — ASSESSMENT & PLAN NOTE
Lipid abnormalities are stable    Plan:  Continue same medication/s without change.      Discussed medication dosage, use, side effects, and goals of treatment in detail.    Counseled patient on lifestyle modifications to help control hyperlipidemia.     Patient Treatment Goals:   LDL goal is under 130    Followup in 6 months.

## 2024-11-17 LAB — BACTERIA SPEC AEROBE CULT: ABNORMAL

## 2024-11-25 ENCOUNTER — PATIENT MESSAGE (OUTPATIENT)
Dept: FAMILY MEDICINE CLINIC | Facility: CLINIC | Age: 69
End: 2024-11-25
Payer: MEDICARE

## 2024-12-02 DIAGNOSIS — M51.369 DEGENERATION OF INTERVERTEBRAL DISC OF LUMBAR REGION WITHOUT DISCOGENIC BACK PAIN OR LOWER EXTREMITY PAIN: ICD-10-CM

## 2024-12-02 DIAGNOSIS — R82.90 CLOUDY URINE: ICD-10-CM

## 2024-12-02 RX ORDER — CIPROFLOXACIN 500 MG/1
500 TABLET, FILM COATED ORAL 2 TIMES DAILY
Qty: 14 TABLET | Refills: 0 | OUTPATIENT
Start: 2024-12-02

## 2024-12-02 NOTE — TELEPHONE ENCOUNTER
Caller: Mona Sheffield    Relationship: Self    Best call back number: [phone]     Requested Prescriptions:   Requested Prescriptions     Pending Prescriptions Disp Refills    Lidocaine, Anorectal, (LMX 5) 5 % cream cream 60 g 1     Sig: Apply nightly as needed.    lidocaine (LIDODERM) 5 % 30 patch 0     Sig: Place 1 patch on the skin as directed by provider Daily for 30 days. Remove & Discard patch within 12 hours or as directed by MD    ciprofloxacin (Cipro) 500 MG tablet 14 tablet 0     Sig: Take 1 tablet by mouth 2 (Two) Times a Day.        Pharm[facility] be [facility]: [id number] [place] [address] AT [place] [address] - [phone]  - [phone] FX     Last office visit with prescribing clinician: 11/15/2024   Last telemedicine visit with prescribing clinician: Visit date not found   Next office visit with prescribing clinician: 4/2/2025     Farrah Craig Rep   12/02/24 14:21 EST

## 2024-12-03 RX ORDER — LIDOCAINE 5 G/100G
CREAM RECTAL; TOPICAL
Qty: 60 G | Refills: 1 | Status: SHIPPED | OUTPATIENT
Start: 2024-12-03

## 2024-12-03 RX ORDER — LIDOCAINE 50 MG/G
1 PATCH TOPICAL DAILY
Qty: 30 PATCH | Refills: 0 | Status: SHIPPED | OUTPATIENT
Start: 2024-12-03 | End: 2025-01-02

## 2024-12-04 DIAGNOSIS — R82.90 CLOUDY URINE: ICD-10-CM

## 2024-12-04 RX ORDER — CIPROFLOXACIN 500 MG/1
500 TABLET, FILM COATED ORAL 2 TIMES DAILY
Qty: 14 TABLET | Refills: 0 | Status: SHIPPED | OUTPATIENT
Start: 2024-12-04

## 2024-12-04 NOTE — TELEPHONE ENCOUNTER
Caller: BozenaTeena de leonany PELAYO    Relationship: Self    Best call back number: [phone]     Requested Prescriptions:   Requested Prescriptions     Pending Prescriptions Disp Refills    ciprofloxacin (Cipro) 500 MG tablet 14 tablet 0     Sig: Take 1 tablet by mouth 2 (Two) Times a Day.        Pharmacy where request should be sent: [facility] #[id number] [place] [address] AT [facility] & [place] [address] - [phone]  - [phone] FX     Last office visit with prescribing clinician: 11/15/2024   Last telemedicine visit with prescribing clinician: Visit date not found   Next office visit with prescribing clinician: 4/2/2025     Additional details provided by patient: PATIENT KEEPS THIS ON HAND BECAUSE SHE USES CATHETERS AND GETS FREQUENT INFECTIONS.    THIS WAS DENIED BEFORE. IF IT CAN'T BE REFILLED PLEASE CALL THE PATIENT TO LET HER KNOW WHAT SHE NEEDS TO DO    Does the patient have less than a 3 day supply:  [x] Yes  [] No    Farrah Beth Rep   12/04/24 14:39 EST

## 2024-12-04 NOTE — TELEPHONE ENCOUNTER
I don't mind to refill but if having to take regularly - we need to test the urine periodically to ensure we are using the correct antibiotic.

## 2024-12-09 NOTE — TELEPHONE ENCOUNTER
LVM regarding mammogram order. Inquiring if has been scheduled, Gave phone number to [facility] at [phone] to call and schedule    Relay

## 2024-12-20 ENCOUNTER — TELEPHONE (OUTPATIENT)
Dept: FAMILY MEDICINE CLINIC | Facility: CLINIC | Age: 69
End: 2024-12-20
Payer: MEDICARE

## 2024-12-20 NOTE — TELEPHONE ENCOUNTER
Spoke with patient and let her know that Teresa is not in the office this afternoon. I advised that if she felt that she was unable to wait for a response for sometime in the next few days that she should go to . She expressed understanding.

## 2024-12-20 NOTE — TELEPHONE ENCOUNTER
Caller: Mona Sheffield    Relationship: Self    Best call back number:     Nettie Mona PELAYO (Self) [phone] (Mobile)       What medication are you requesting: ANTIBIOTICS    What are your current symptoms: INFECTION FROM CATHETER     How long have you been experiencing symptoms:     Have you had these symptoms before:    [x] Yes  [] No    Have you been treated for these symptoms before:   [x] Yes  [] No    If a prescription is needed, what is your preferred pharmacy and phone number: [facility] #[id number] [place] [address] AT [place] [address] - [phone]  - [phone]      Additional notes:

## 2024-12-20 NOTE — TELEPHONE ENCOUNTER
Caller: Mona Sheffield    Relationship: Self    Best call back number:     [phone] (Mobile)       What was the call regarding: PATIENT IS ASKING FOR AN UPDATE ON THIS REQUEST      Is it okay if the provider responds through MyChart: CALL

## 2024-12-20 NOTE — TELEPHONE ENCOUNTER
Pt stated that she finished the cipro and did not notice an improvement at all. She stated that she is sick to her stomach, bloated and where she is placing the straight cath to urinate is very swollen and painful. She has tried getting a hold of urology and has not heard anything back. She is asking if we can call something in to help her. Please advise.

## 2024-12-30 NOTE — TELEPHONE ENCOUNTER
Left message for patient to call back and let us know how symptoms are doing and if she has been treated at .    Hypothyroidism

## 2025-01-02 DIAGNOSIS — M51.369 DDD (DEGENERATIVE DISC DISEASE), LUMBAR: ICD-10-CM

## 2025-01-02 RX ORDER — OXYCODONE AND ACETAMINOPHEN 10; 325 MG/1; MG/1
1 TABLET ORAL 2 TIMES DAILY PRN
Qty: 60 TABLET | Refills: 0 | Status: SHIPPED | OUTPATIENT
Start: 2025-01-02

## 2025-01-02 NOTE — TELEPHONE ENCOUNTER
Left patient a detailed message and advised her to call back if she is still having UTI symptoms and needing to be seen or if she did not go to UC.

## 2025-01-02 NOTE — TELEPHONE ENCOUNTER
Caller: SandravinnyMona    Relationship: Self    Best call back number: [phone]     Requested Prescriptions:   Requested Prescriptions     Pending Prescriptions Disp Refills    oxyCODONE-acetaminophen (PERCOCET)  MG per tablet 60 tablet 0     Sig: Take 1 tablet by mouth 2 (Two) Times a Day As Needed for Moderate Pain.        Pharmacy where request should be sent: [id number] [place] [address] AT [place] [address] - [phone] [facility] [phone]      Last office visit with prescribing clinician: 11/15/2024   Last telemedicine visit with prescribing clinician: Visit date not found   Next office visit with prescribing clinician: 4/2/2025     Additional details provided by patient:     Does the patient have less than a 3 day supply:  [] Yes  [] No    Would you like a call back once the refill request has been completed: [] Yes [] No    If the office needs to give you a call back, can they leave a voicemail: [] Yes [] No    Farrah Maurice Rep   01/02/25 11:07 EST

## 2025-01-09 ENCOUNTER — TELEPHONE (OUTPATIENT)
Dept: FAMILY MEDICINE CLINIC | Facility: CLINIC | Age: 70
End: 2025-01-09
Payer: MEDICARE

## 2025-01-09 DIAGNOSIS — G47.00 INSOMNIA, UNSPECIFIED TYPE: ICD-10-CM

## 2025-01-09 RX ORDER — TRAZODONE HYDROCHLORIDE 50 MG/1
50 TABLET, FILM COATED ORAL NIGHTLY
Qty: 30 TABLET | Refills: 1 | Status: SHIPPED | OUTPATIENT
Start: 2025-01-09

## 2025-01-09 RX ORDER — LEVOFLOXACIN 500 MG/1
500 TABLET, FILM COATED ORAL DAILY
Qty: 7 TABLET | Refills: 0 | Status: SHIPPED | OUTPATIENT
Start: 2025-01-09

## 2025-01-09 NOTE — TELEPHONE ENCOUNTER
I am sending over Levaquin based on her most recent urine culture.  Does she have a follow-up scheduled with urology?

## 2025-01-09 NOTE — TELEPHONE ENCOUNTER
Caller: Mona Sheffield    Relationship: Self    Best call back number: [phone]     What medication are you requesting: ANTIBIOTIC    What are your current symptoms: PATIENT USES A CATHETER AND SAID HER URINE IS WHITE AND HAVING A HARD TIME GETTING CATHETER IN.  SHE HAS AN INFECTION.  ASKING FOR AN ANTIBIOTIC.  CAN'T GET TO OFFICE TO LEAVE A URINE SPECIMEN AT THIS TIME.      How long have you been experiencing symptoms: ABOUT 4 DAYS    If a prescription is needed, what is your preferred pharmacy and phone number: [facility] #[id number] [place] [address] AT [facility] [address] - [phone] ph - [phone] FX

## 2025-01-09 NOTE — TELEPHONE ENCOUNTER
Pt notified and expressed understanding. Pt is going to call to get scheduled for a follow up with urology.

## 2025-02-05 DIAGNOSIS — M51.369 DDD (DEGENERATIVE DISC DISEASE), LUMBAR: ICD-10-CM

## 2025-02-05 RX ORDER — OXYCODONE AND ACETAMINOPHEN 10; 325 MG/1; MG/1
1 TABLET ORAL 2 TIMES DAILY PRN
Qty: 60 TABLET | Refills: 0 | Status: SHIPPED | OUTPATIENT
Start: 2025-02-05

## 2025-02-05 NOTE — TELEPHONE ENCOUNTER
Caller: Mona Sheffield    Relationship: Self    Best call back number: [phone]    Requested Prescriptions:   Requested Prescriptions     Pending Prescriptions Disp Refills    oxyCODONE-acetaminophen (PERCOCET)  MG per tablet 60 tablet 0     Sig: Take 1 tablet by mouth 2 (Two) Times a Day As Needed for Moderate Pain.        Pharmacy where request should be sent: [facility] #[id number] [place] [address] AT [facility] & [place] [address] - [phone]  - [phone] FX     Last office visit with prescribing clinician: 11/15/2024   Last telemedicine visit with prescribing clinician: Visit date not found   Next office visit with prescribing clinician: 4/2/2025     Does the patient have less than a 3 day supply:  [x] Yes  [] No    Farrah Umanzor Rep   02/05/25 13:22 EST

## 2025-03-11 DIAGNOSIS — M51.369 DDD (DEGENERATIVE DISC DISEASE), LUMBAR: ICD-10-CM

## 2025-03-11 RX ORDER — OXYCODONE AND ACETAMINOPHEN 10; 325 MG/1; MG/1
1 TABLET ORAL 2 TIMES DAILY PRN
Qty: 60 TABLET | Refills: 0 | Status: SHIPPED | OUTPATIENT
Start: 2025-03-11

## 2025-03-11 NOTE — TELEPHONE ENCOUNTER
Caller: BozenamoisesKrunalMona J    Relationship: Self    Best call back number: [phone]     Requested Prescriptions:   Requested Prescriptions     Pending Prescriptions Disp Refills    oxyCODONE-acetaminophen (PERCOCET)  MG per tablet 60 tablet 0     Sig: Take 1 tablet by mouth 2 (Two) Times a Day As Needed for Moderate Pain.        Pharmacy where request should be sent: [id number] [place] [address] AT [place] [address] - [phone]  - [phone]      Last office visit with prescribing clinician: 11/15/2024   Last telemedicine visit with prescribing clinician: Visit date not found   Next office visit with prescribing clinician: 4/2/2025     Does the patient have less than a 3 day supply:  [x] Yes  [] No    Would you like a call back once the refill request has been completed: [] Yes [x] No    If the office needs to give you a call back, can they leave a voicemail: [] Yes [x] No    Farrah Mauricio Rep   03/11/25 13:55 EDT

## 2025-03-17 ENCOUNTER — TELEPHONE (OUTPATIENT)
Dept: FAMILY MEDICINE CLINIC | Facility: CLINIC | Age: 70
End: 2025-03-17
Payer: MEDICARE

## 2025-03-17 NOTE — TELEPHONE ENCOUNTER
Macrobid is an antibiotic that you take when needed for acute urinary tract infections.  It is not something that she should be taking daily unless her urologist has advised otherwise.  If so, would recommend follow-up with them.  If she is having acute urinary symptoms, let me know

## 2025-03-17 NOTE — TELEPHONE ENCOUNTER
Caller: Mona Sheffield    Relationship: Self    Best call back number: [phone]     What was the call regarding: PATIENT STATED THAT SHE IS NEEDING A REFILL OF NITROFURANTOIN 100 MG (NOT ON MED LIST). PATIENT STATED THAT SHE IS OUT OF MEDICATION. PLEASE ADVISE.    PHARMACY:  [facility] #[id number] [place] [address] AT [facility] [address] - [phone]  - [phone] FX

## 2025-03-18 ENCOUNTER — OFFICE VISIT (OUTPATIENT)
Dept: CARDIOLOGY | Facility: CLINIC | Age: 70
End: 2025-03-18
Payer: MEDICARE

## 2025-03-18 ENCOUNTER — TELEPHONE (OUTPATIENT)
Dept: FAMILY MEDICINE CLINIC | Facility: CLINIC | Age: 70
End: 2025-03-18
Payer: MEDICARE

## 2025-03-18 VITALS
HEART RATE: 70 BPM | HEIGHT: 66 IN | SYSTOLIC BLOOD PRESSURE: 140 MMHG | OXYGEN SATURATION: 98 % | WEIGHT: 170 LBS | DIASTOLIC BLOOD PRESSURE: 85 MMHG | BODY MASS INDEX: 27.32 KG/M2

## 2025-03-18 DIAGNOSIS — I10 PRIMARY HYPERTENSION: ICD-10-CM

## 2025-03-18 DIAGNOSIS — R00.2 PALPITATIONS: Primary | ICD-10-CM

## 2025-03-18 PROCEDURE — 3077F SYST BP >= 140 MM HG: CPT | Performed by: INTERNAL MEDICINE

## 2025-03-18 PROCEDURE — 1159F MED LIST DOCD IN RCRD: CPT | Performed by: INTERNAL MEDICINE

## 2025-03-18 PROCEDURE — 1160F RVW MEDS BY RX/DR IN RCRD: CPT | Performed by: INTERNAL MEDICINE

## 2025-03-18 PROCEDURE — 3079F DIAST BP 80-89 MM HG: CPT | Performed by: INTERNAL MEDICINE

## 2025-03-18 PROCEDURE — 99213 OFFICE O/P EST LOW 20 MIN: CPT | Performed by: INTERNAL MEDICINE

## 2025-03-18 RX ORDER — CIPROFLOXACIN 250 MG/1
250 TABLET, FILM COATED ORAL 2 TIMES DAILY
Qty: 14 TABLET | Refills: 0 | Status: SHIPPED | OUTPATIENT
Start: 2025-03-18 | End: 2025-03-25

## 2025-03-18 NOTE — PROGRESS NOTES
HP      Name: Mona Sheffield ADMIT: (Not on file)   : 1955  PCP: Teresa Fernandez APRN    MRN: [id number] LOS: 0 days   AGE/SEX: 69 y.o. female  ROOM: Room/bed info not found     Chief Complaint   Patient presents with    Consult     Np Ref Dr Roa       Subjective        History of present illness  Mona Sheffield is a 69-year-old female patient who has no history of documented cardiac issues, she is a smoker, is here today to discuss about symptoms of palpitations, shortness of breath and generally not feeling well.  Denies any significant exertional chest pain, no syncopal episodes.    Past Medical History:   Diagnosis Date    Arthritis     Asthma     Depression     Hypertension     Injury of back     Pneumonia      Past Surgical History:   Procedure Laterality Date    APPENDECTOMY      CHOLECYSTECTOMY      HYSTERECTOMY      SPINAL CORD STIMULATOR TRIAL W/ LAMINOTOMY      TONSILLECTOMY       Family History   Problem Relation Age of Onset    Hypertension Mother     Heart failure Mother     Hypertension Father     Heart disease Father     Heart failure Brother      Social History     Tobacco Use    Smoking status: Every Day     Current packs/day: 0.50     Average packs/day: 0.5 packs/day for 50.0 years (25.0 ttl pk-yrs)     Types: Cigarettes     Passive exposure: Current    Smokeless tobacco: Never   Vaping Use    Vaping status: Never Used   Substance Use Topics    Alcohol use: Yes     Alcohol/week: 2.0 standard drinks of alcohol     Types: 1 Glasses of wine, 1 Cans of beer per week     Comment: OCC    Drug use: Yes     Types: Marijuana     Comment: medical marijuana     (Not in a hospital admission)    Allergies:  Patient has no known allergies.    Review of systems    Constitutional: Negative.    Respiratory and cardiovascular: As detailed in HPI section.  Gastrointestinal: Negative for constipation, nausea and vomiting negative for abdominal distention, abdominal pain and diarrhea.    Genitourinary: Negative for difficulty urinating and flank pain.   Musculoskeletal: Negative for arthralgias, joint swelling and myalgias.   Skin: Negative for color change, rash and wound.   Neurological: Negative for dizziness, syncope, weakness and headaches.   Hematological: Negative for adenopathy.   Psychiatric/Behavioral: Negative for confusion.   All other systems reviewed and are negative.    Physical Exam  VITALS REVIEWED    General:      well developed, in no acute distress.    Head:      normocephalic and atraumatic.    Eyes:      PERRL/EOM intact, conjunctiva and sclera clear with out nystagmus.    Neck:      no masses, thyromegaly,  trachea central with normal respiratory effort and PMI displaced laterally  Lungs:      Clear to auscultation bilaterally  Heart:       Regular rate and rhythm, no murmur  Msk:      no deformity or scoliosis noted of thoracic or lumbar spine.    Pulses:      pulses normal in all 4 extremities.    Extremities:       No lower extremity edema  Neurologic:      no focal deficits.   alert oriented x3  Skin:      intact without lesions or rashes.    Psych:      alert and cooperative; normal mood and affect; normal attention span and concentration.      Result Review :               Pertinent cardiac workup    Stress test 11/2/2023 low risk, EF 73%.  Echo 11/20/2023 ejection fraction 60 to 65%  EKG 9/14/2023 sinus bradycardia heart rate 49 bpm.      Procedures        Assessment and Plan      Mona Sheffield is a 69-year-old female patient who has no documented history of CAD, is here today to discuss about symptoms of palpitations, shortness of breath and generalized weakness.  She does have high blood pressure and she is on multiple antihypertensives.  In the past she has had low heart rate and the pacemaker was suggested but did not get it.  I did not see any A-fib.  She has recent echo and stress test which were unremarkable.  For now I would like to start with a 2-week  Holter monitor to see if she is experiencing bradycardia as a cause of her symptoms.  Otherwise she needs to work on quitting smoking.  Will see her after completion of the monitor.    Diagnoses and all orders for this visit:    1. Palpitations (Primary)  -     Holter Monitor - 24 Hour; Future    2. Primary hypertension           No follow-ups on file.  Patient was given instructions and counseling regarding her condition or for health maintenance advice. Please see specific information pulled into the AVS if appropriate.       Electronically signed by Barby Hopper MD, 03/18/25, 11:48 AM EDT.

## 2025-03-18 NOTE — TELEPHONE ENCOUNTER
Call received from patient approximately 7:30 PM when on call.  She describes symptoms of dysuria, sweats, chills, nausea.  Reported she was started on intermittent self catheterization a year ago and has had multiple antibiotics since then.  Looks like she is on suppressive doses of Macrodantin daily per her urologist.  I sent in a 7-day course of Cipro for her and instructed her to contact PCP office if symptoms do not resolve within 48 hours of starting the Cipro.  Informed her that collecting a urine sample after initiation of antibiotics is likely to yield unreliable results.  She voiced understanding and appreciation for the antibiotic.

## 2025-03-19 NOTE — TELEPHONE ENCOUNTER
There is another message on patient to check in on her because she called the on call provider. Will document on that message regarding information at this time.

## 2025-03-20 ENCOUNTER — TELEPHONE (OUTPATIENT)
Dept: FAMILY MEDICINE CLINIC | Facility: CLINIC | Age: 70
End: 2025-03-20
Payer: MEDICARE

## 2025-03-20 NOTE — TELEPHONE ENCOUNTER
Hub staff attempted to follow warm transfer process and was unsuccessful     Caller: Mona Sheffield    Relationship to patient: Self    Best call back number: [phone]    Patient is needing: CALL BACK FROM JULI

## 2025-03-20 NOTE — TELEPHONE ENCOUNTER
Pt stated that things are improving some. She is going to follow up with her urologist as well to see if there is anything more she needs to do.

## 2025-03-24 ENCOUNTER — TELEPHONE (OUTPATIENT)
Dept: CARDIOLOGY | Facility: CLINIC | Age: 70
End: 2025-03-24
Payer: MEDICARE

## 2025-03-24 NOTE — TELEPHONE ENCOUNTER
Hub staff attempted to follow warm transfer process and was unsuccessful     Caller: Mona Sheffield    Relationship to patient: Self    Best call back number: [phone]    Patient is needing: PATIENT IS NOT FEELING WELL AND NEEDS HOLTER MONITOR APPOINTMENT 03.24.25 @ 1 PM RESCHEDULED. PLEASE CONTACT PATIENT. THANK YOU.

## 2025-04-03 DIAGNOSIS — M51.369 DDD (DEGENERATIVE DISC DISEASE), LUMBAR: ICD-10-CM

## 2025-04-03 RX ORDER — OXYCODONE AND ACETAMINOPHEN 10; 325 MG/1; MG/1
1 TABLET ORAL 2 TIMES DAILY PRN
Qty: 60 TABLET | Refills: 0 | Status: SHIPPED | OUTPATIENT
Start: 2025-04-03

## 2025-04-03 NOTE — TELEPHONE ENCOUNTER
Caller: Mona Sheffield    Relationship: Self    Best call back number:     Mona Sheffield (Self) [phone] (Mobile)       What was the call regarding:    PATIENT CANNOT GET IN TO HER MYCHART AND NEEDS THE HELP DESK NUMBER. CAN YOU TEXT HER THIS NUMBER [phone]    Is it okay if the provider responds through MyChart:  TEXT MYCHART NUMBER FOR ASSISTANCE     SHE ALSO NEEDS A REFILL ON HER OXYCODONE      PATIENT  IS HAD TO RESCHEDULE HER MWV/ AND THE SOONEST AVAILABLE IS LATE AUG.     CAN SHE SEE ANOTHER PROVIDER?      ALSO, PATIENT SCHEDULED AN APPT FOR EAR PAIN, BUT THE SOONEST IS  4-24-25     Requested Prescriptions:   Requested Prescriptions     Pending Prescriptions Disp Refills    oxyCODONE-acetaminophen (PERCOCET)  MG per tablet 60 tablet 0     Sig: Take 1 tablet by mouth 2 (Two) Times a Day As Needed for Moderate Pain.        Pharmacy where request should be sent: [id number] [place] [address] [address] [place] [address] - [phone] [facility] [phone] FX     Last office visit with prescribing clinician: 11/15/2024   Last telemedicine visit with prescribing clinician: Visit date not found   Next office visit with prescribing clinician: 4/24/2025     Additional details provided by patient:     Does the patient have less than a 3 day supply:  [] Yes  [x] No    Would you like a call back once the refill request has been completed: [] Yes [] No    If the office needs to give you a call back, can they leave a voicemail: [] Yes [] No    Farrah Granger Rep   04/03/25 11:14 EDT

## 2025-04-10 ENCOUNTER — OFFICE VISIT (OUTPATIENT)
Dept: FAMILY MEDICINE CLINIC | Facility: CLINIC | Age: 70
End: 2025-04-10
Payer: MEDICARE

## 2025-04-10 VITALS
WEIGHT: 172 LBS | DIASTOLIC BLOOD PRESSURE: 80 MMHG | HEIGHT: 66 IN | SYSTOLIC BLOOD PRESSURE: 124 MMHG | OXYGEN SATURATION: 96 % | HEART RATE: 76 BPM | BODY MASS INDEX: 27.64 KG/M2

## 2025-04-10 DIAGNOSIS — R00.2 PALPITATIONS: ICD-10-CM

## 2025-04-10 DIAGNOSIS — M79.605 PAIN IN BOTH LOWER EXTREMITIES: ICD-10-CM

## 2025-04-10 DIAGNOSIS — H61.22 IMPACTED CERUMEN OF LEFT EAR: Primary | ICD-10-CM

## 2025-04-10 DIAGNOSIS — J01.10 ACUTE NON-RECURRENT FRONTAL SINUSITIS: ICD-10-CM

## 2025-04-10 DIAGNOSIS — M79.604 PAIN IN BOTH LOWER EXTREMITIES: ICD-10-CM

## 2025-04-10 PROCEDURE — 1160F RVW MEDS BY RX/DR IN RCRD: CPT | Performed by: NURSE PRACTITIONER

## 2025-04-10 PROCEDURE — 99214 OFFICE O/P EST MOD 30 MIN: CPT | Performed by: NURSE PRACTITIONER

## 2025-04-10 PROCEDURE — 1159F MED LIST DOCD IN RCRD: CPT | Performed by: NURSE PRACTITIONER

## 2025-04-10 PROCEDURE — 1125F AMNT PAIN NOTED PAIN PRSNT: CPT | Performed by: NURSE PRACTITIONER

## 2025-04-10 PROCEDURE — 3079F DIAST BP 80-89 MM HG: CPT | Performed by: NURSE PRACTITIONER

## 2025-04-10 PROCEDURE — 3074F SYST BP LT 130 MM HG: CPT | Performed by: NURSE PRACTITIONER

## 2025-04-10 RX ORDER — NITROFURANTOIN MACROCRYSTALS 50 MG/1
50 CAPSULE ORAL DAILY
COMMUNITY

## 2025-04-10 RX ORDER — AZITHROMYCIN 250 MG/1
TABLET, FILM COATED ORAL
Qty: 6 TABLET | Refills: 0 | Status: SHIPPED | OUTPATIENT
Start: 2025-04-10

## 2025-04-10 NOTE — ASSESSMENT & PLAN NOTE
Patient is to obtain records or place of testing so we can review and refer to appropriate specialties

## 2025-04-10 NOTE — PROGRESS NOTES
"Chief Complaint  Earache (Drainage from year and seems to have pain at night time), Sinusitis (Sinus pressure in face ), and Headache (On the left side of head )    Subjective        Mona Sheffield presents to [facility]    History of Present Illness    Patient presents with complaints of sinus pain and pressure, headache and ear pain.    Patient also wants to update me on a few things. She is wearing a cardiac monitor due to dyspnea, palpitations. Testing ordered per Dr. Hopper. Patient also reports she went a \"neuropathy clinic.\" She reports 20 % circulation on right leg and 40 % on left leg. Patient is wearing compression hose. She does not have these results with her.     Objective   Vital Signs:  /80 (BP Location: Left arm, Patient Position: Sitting, Cuff Size: Adult)   Pulse 76   Ht 167.6 cm (66\")   Wt 78 kg (172 lb)   SpO2 96%   BMI 27.76 kg/m²   Estimated body mass index is 27.76 kg/m² as calculated from the following:    Height as of this encounter: 167.6 cm (66\").    Weight as of this encounter: 78 kg (172 lb).          Physical Exam  Constitutional:       Appearance: Normal appearance.   HENT:      Head: Normocephalic.      Right Ear: Tympanic membrane normal.      Left Ear: There is impacted cerumen.      Mouth/Throat:      Pharynx: Oropharynx is clear.   Cardiovascular:      Rate and Rhythm: Normal rate and regular rhythm.   Pulmonary:      Effort: Pulmonary effort is normal.      Breath sounds: Normal breath sounds.   Abdominal:      General: Abdomen is flat. Bowel sounds are normal.      Palpations: Abdomen is soft.   Musculoskeletal:         General: Normal range of motion.      Cervical back: Neck supple.      Right lower leg: No edema.      Left lower leg: No edema.   Skin:     General: Skin is warm and dry.   Neurological:      Mental Status: She is alert and oriented to person, place, and time.      Gait: Gait is intact.   Psychiatric:         " Attention and Perception: Attention normal.         Mood and Affect: Mood normal.         Speech: Speech normal.        Result Review :    CMP          7/9/2024    14:33   CMP   Glucose 116    BUN 19    Creatinine 1.01    EGFR 61.1    Sodium 137    Potassium 4.3    Chloride 101    Calcium 9.5    BUN/Creatinine Ratio 18.8    Anion Gap 11.2                        Assessment and Plan   Diagnoses and all orders for this visit:    1. Impacted cerumen of left ear (Primary)  Assessment & Plan:  Irrigation ordered      2. Acute non-recurrent frontal sinusitis  -     azithromycin (Zithromax) 250 MG tablet; Take 2 tablets the first day, then 1 tablet daily for 4 days.  Dispense: 6 tablet; Refill: 0    3. Palpitations  Assessment & Plan:  Patient is wearing a cardiac monitor  Follow up with Cardiology      4. Pain in both lower extremities  Assessment & Plan:  Patient is to obtain records or place of testing so we can review and refer to appropriate specialties             I spent 30 minutes caring for Mona on this date of service. This time includes time spent by me in the following activities:preparing for the visit, reviewing tests, obtaining and/or reviewing a separately obtained history, performing a medically appropriate examination and/or evaluation , counseling and educating the patient/family/caregiver, ordering medications, tests, or procedures, documenting information in the medical record, and care coordination  Follow Up   Return in about 3 months (around 7/10/2025) for Medicare Wellness.  Patient was given instructions and counseling regarding her condition or for health maintenance advice. Please see specific information pulled into the AVS if appropriate.

## 2025-04-21 RX ORDER — ATORVASTATIN CALCIUM 40 MG/1
40 TABLET, FILM COATED ORAL DAILY
Qty: 90 TABLET | Refills: 1 | Status: SHIPPED | OUTPATIENT
Start: 2025-04-21

## 2025-04-30 ENCOUNTER — RESULTS FOLLOW-UP (OUTPATIENT)
Dept: CARDIOLOGY | Facility: CLINIC | Age: 70
End: 2025-04-30
Payer: MEDICARE

## 2025-05-19 DIAGNOSIS — M51.369 DDD (DEGENERATIVE DISC DISEASE), LUMBAR: ICD-10-CM

## 2025-05-19 RX ORDER — OXYCODONE AND ACETAMINOPHEN 10; 325 MG/1; MG/1
1 TABLET ORAL 2 TIMES DAILY PRN
Qty: 60 TABLET | Refills: 0 | Status: SHIPPED | OUTPATIENT
Start: 2025-05-19

## 2025-05-19 NOTE — TELEPHONE ENCOUNTER
Caller: BozenaTeena de leonany PELAYO    Relationship: Self    Best call back number: [phone]     Requested Prescriptions:   Requested Prescriptions     Pending Prescriptions Disp Refills    oxyCODONE-acetaminophen (PERCOCET)  MG per tablet 60 tablet 0     Sig: Take 1 tablet by mouth 2 (Two) Times a Day As Needed for Moderate Pain.        Pharmacy where request should be sent: [id number] [place] [address] AT [place] [address] - [phone] [facility] [phone]      Last office visit with prescribing clinician: 4/10/2025   Last telemedicine visit with prescribing clinician: Visit date not found   Next office visit with prescribing clinician: 8/22/2025     Additional details provided by patient:     Does the patient have less than a 3 day supply:  [x] Yes  [] No    Would you like a call back once the refill request has been completed: [] Yes [x] No    If the office needs to give you a call back, can they leave a voicemail: [] Yes [x] No    Farrah Crawley Rep   05/19/25 14:13 EDT

## 2025-05-28 ENCOUNTER — TELEPHONE (OUTPATIENT)
Dept: FAMILY MEDICINE CLINIC | Facility: CLINIC | Age: 70
End: 2025-05-28
Payer: MEDICARE

## 2025-05-28 RX ORDER — AMOXICILLIN 500 MG/1
500 CAPSULE ORAL 2 TIMES DAILY
Qty: 20 CAPSULE | Refills: 0 | Status: SHIPPED | OUTPATIENT
Start: 2025-05-28 | End: 2025-06-07

## 2025-05-28 NOTE — TELEPHONE ENCOUNTER
Amoxicillin sent to patient's pharmacy.  If symptoms persist or worsen, she needs to call or be seen

## 2025-05-28 NOTE — TELEPHONE ENCOUNTER
Caller: Mona Sheffield    Relationship: Self    Best call back number: [phone]    What medication are you requesting: ANTIBIOTIC     What are your current symptoms: SORE THROAT, EAR INFECTION , SINUSES     How long have you been experiencing symptoms:     Have you had these symptoms before:    [x] Yes  [] No    Have you been treated for these symptoms before:   [x] Yes  [] No    If a prescription is needed, what is your preferred pharmacy and phone number: [facility] #[id number] [place] [address] AT [place] [address] - [phone]  - [phone]      Additional notes:

## 2025-06-17 ENCOUNTER — TELEPHONE (OUTPATIENT)
Dept: FAMILY MEDICINE CLINIC | Facility: CLINIC | Age: 70
End: 2025-06-17
Payer: MEDICARE

## 2025-06-17 NOTE — TELEPHONE ENCOUNTER
Pt stated that she has had symptoms since the end of May and she took the amoxicillin that you called in. She takes an allergy pill daily and she has been taking Advil cold and sinus. She said the rain seems to make things worse and she is feeling miserable.

## 2025-06-17 NOTE — TELEPHONE ENCOUNTER
Caller: Mona Sheffield    Relationship: Self    Best call back number: [phone]    What medication are you requesting: WHATEVER WILL HELP     What are your current symptoms: EAR PAIN BAD HEADACHES AND SINUS TROUBLES +REGINA     How long have you been experiencing symptoms: 1 WEEK BUT GETTING WORSE     Have you had these symptoms before:      [] Yes  [x] No    Have you been treated for these symptoms before:     [] Yes  [x] No    If a prescription is needed, what is your preferred pharmacy and phone number: [facility] #[id number] [place] [address] [address] [place] [address] - [phone]  - [phone] FX     Additional notes: PLEASE CALL TO CONFIRM OR DENY

## 2025-06-18 RX ORDER — CHLORCYCLIZINE HYDROCHLORIDE AND PSEUDOEPHEDRINE HYDROCHLORIDE 25; 60 MG/1; MG/1
1 TABLET ORAL EVERY 8 HOURS PRN
Qty: 42 TABLET | Refills: 0 | Status: SHIPPED | OUTPATIENT
Start: 2025-06-18

## 2025-06-18 RX ORDER — AZELASTINE 1 MG/ML
2 SPRAY, METERED NASAL 2 TIMES DAILY
Qty: 18 ML | Refills: 0 | Status: SHIPPED | OUTPATIENT
Start: 2025-06-18 | End: 2025-07-18

## 2025-06-18 RX ORDER — MONTELUKAST SODIUM 10 MG/1
10 TABLET ORAL NIGHTLY
Qty: 30 TABLET | Refills: 1 | Status: SHIPPED | OUTPATIENT
Start: 2025-06-18

## 2025-06-18 NOTE — TELEPHONE ENCOUNTER
I am sending over something for allergies, congestion and a nasal spray. In addition, I want her to irrigate nasal passages with normal saline and take a daily antihistamine. If fever, persistent sinus pain or other concerns - should let us know

## 2025-06-23 DIAGNOSIS — J44.9 CHRONIC OBSTRUCTIVE PULMONARY DISEASE, UNSPECIFIED COPD TYPE: ICD-10-CM

## 2025-06-24 RX ORDER — ALBUTEROL SULFATE 0.83 MG/ML
SOLUTION RESPIRATORY (INHALATION)
Qty: 180 ML | Refills: 0 | Status: SHIPPED | OUTPATIENT
Start: 2025-06-24

## 2025-07-22 DIAGNOSIS — M51.369 DDD (DEGENERATIVE DISC DISEASE), LUMBAR: ICD-10-CM

## 2025-07-22 NOTE — TELEPHONE ENCOUNTER
Caller: Mona Sheffield GITA    Relationship: Self    Best call back number: [phone]     Requested Prescriptions:   Requested Prescriptions     Pending Prescriptions Disp Refills    oxyCODONE-acetaminophen (PERCOCET)  MG per tablet 60 tablet 0     Sig: Take 1 tablet by mouth 2 (Two) Times a Day As Needed for Moderate Pain.    sertraline (ZOLOFT) 100 MG tablet 135 tablet 0     Sig: Take 1.5 tablets by mouth Daily.    losartan-hydrochlorothiazide (HYZAAR) 100-25 MG per tablet 90 tablet 0     Sig: Take 1 tablet by mouth Daily.    atorvastatin (LIPITOR) 40 MG tablet 90 tablet 1     Sig: Take 1 tablet by mouth Daily.        Pharmacy where request should be sent: [facility] #[id number] [place] [address] [address] [place] [address] - [phone]  - [phone] FX     Last office visit with prescribing clinician: 4/10/2025   Last telemedicine visit with prescribing clinician: Visit date not found   Next office visit with prescribing clinician: 8/22/2025     Additional details provided by patient: PATIENT IS OUT OF PERCOCET AND ZOLOFT    Does the patient have less than a 3 day supply:  [x] Yes  [] No      Farrah Ruiz Rep   07/22/25 13:53 EDT

## 2025-07-23 RX ORDER — ATORVASTATIN CALCIUM 40 MG/1
40 TABLET, FILM COATED ORAL DAILY
Qty: 90 TABLET | Refills: 1 | Status: SHIPPED | OUTPATIENT
Start: 2025-07-23

## 2025-07-23 RX ORDER — SERTRALINE HYDROCHLORIDE 100 MG/1
150 TABLET, FILM COATED ORAL DAILY
Qty: 135 TABLET | Refills: 0 | Status: SHIPPED | OUTPATIENT
Start: 2025-07-23

## 2025-07-23 RX ORDER — OXYCODONE AND ACETAMINOPHEN 10; 325 MG/1; MG/1
1 TABLET ORAL 2 TIMES DAILY PRN
Qty: 60 TABLET | Refills: 0 | Status: SHIPPED | OUTPATIENT
Start: 2025-07-23

## 2025-07-23 RX ORDER — LOSARTAN POTASSIUM AND HYDROCHLOROTHIAZIDE 25; 100 MG/1; MG/1
1 TABLET ORAL DAILY
Qty: 90 TABLET | Refills: 0 | Status: SHIPPED | OUTPATIENT
Start: 2025-07-23

## 2025-08-21 ENCOUNTER — TELEPHONE (OUTPATIENT)
Dept: FAMILY MEDICINE CLINIC | Facility: CLINIC | Age: 70
End: 2025-08-21
Payer: MEDICARE

## 2025-08-22 ENCOUNTER — OFFICE VISIT (OUTPATIENT)
Dept: FAMILY MEDICINE CLINIC | Facility: CLINIC | Age: 70
End: 2025-08-22
Payer: MEDICARE

## 2025-08-22 ENCOUNTER — RESULTS FOLLOW-UP (OUTPATIENT)
Dept: FAMILY MEDICINE CLINIC | Facility: CLINIC | Age: 70
End: 2025-08-22

## 2025-08-22 VITALS
HEIGHT: 66 IN | WEIGHT: 169 LBS | OXYGEN SATURATION: 94 % | BODY MASS INDEX: 27.16 KG/M2 | HEART RATE: 65 BPM | SYSTOLIC BLOOD PRESSURE: 102 MMHG | DIASTOLIC BLOOD PRESSURE: 76 MMHG

## 2025-08-22 DIAGNOSIS — Z91.09 ENVIRONMENTAL ALLERGIES: ICD-10-CM

## 2025-08-22 DIAGNOSIS — I10 PRIMARY HYPERTENSION: ICD-10-CM

## 2025-08-22 DIAGNOSIS — M25.551 PAIN OF BOTH HIP JOINTS: ICD-10-CM

## 2025-08-22 DIAGNOSIS — Z12.31 ENCOUNTER FOR SCREENING MAMMOGRAM FOR MALIGNANT NEOPLASM OF BREAST: ICD-10-CM

## 2025-08-22 DIAGNOSIS — G47.00 INSOMNIA, UNSPECIFIED TYPE: ICD-10-CM

## 2025-08-22 DIAGNOSIS — F32.A ANXIETY AND DEPRESSION: ICD-10-CM

## 2025-08-22 DIAGNOSIS — G47.33 OSA (OBSTRUCTIVE SLEEP APNEA): ICD-10-CM

## 2025-08-22 DIAGNOSIS — Z12.2 ENCOUNTER FOR SCREENING FOR LUNG CANCER: ICD-10-CM

## 2025-08-22 DIAGNOSIS — F41.9 ANXIETY AND DEPRESSION: ICD-10-CM

## 2025-08-22 DIAGNOSIS — E55.9 VITAMIN D DEFICIENCY: ICD-10-CM

## 2025-08-22 DIAGNOSIS — G89.29 CHRONIC PAIN OF LEFT KNEE: ICD-10-CM

## 2025-08-22 DIAGNOSIS — J44.9 CHRONIC OBSTRUCTIVE PULMONARY DISEASE, UNSPECIFIED COPD TYPE: ICD-10-CM

## 2025-08-22 DIAGNOSIS — M25.562 CHRONIC PAIN OF LEFT KNEE: ICD-10-CM

## 2025-08-22 DIAGNOSIS — R00.2 PALPITATIONS: ICD-10-CM

## 2025-08-22 DIAGNOSIS — M25.552 PAIN OF BOTH HIP JOINTS: ICD-10-CM

## 2025-08-22 DIAGNOSIS — Z12.31 SCREENING MAMMOGRAM FOR BREAST CANCER: ICD-10-CM

## 2025-08-22 DIAGNOSIS — M51.362 DEGENERATION OF INTERVERTEBRAL DISC OF LUMBAR REGION WITH DISCOGENIC BACK PAIN AND LOWER EXTREMITY PAIN: Primary | ICD-10-CM

## 2025-08-22 DIAGNOSIS — N39.0 RECURRENT UTI: ICD-10-CM

## 2025-08-22 DIAGNOSIS — M51.369 DDD (DEGENERATIVE DISC DISEASE), LUMBAR: ICD-10-CM

## 2025-08-22 DIAGNOSIS — Z00.00 MEDICARE ANNUAL WELLNESS VISIT, SUBSEQUENT: ICD-10-CM

## 2025-08-22 DIAGNOSIS — Z87.891 PERSONAL HISTORY OF TOBACCO USE: ICD-10-CM

## 2025-08-22 LAB
BILIRUB BLD-MCNC: NEGATIVE MG/DL
BILIRUB UR QL STRIP: NEGATIVE
CLARITY UR: ABNORMAL
CLARITY, POC: ABNORMAL
COLOR UR: YELLOW
COLOR UR: YELLOW
DEPRECATED RDW RBC AUTO: 43.5 FL (ref 37–54)
ERYTHROCYTE [DISTWIDTH] IN BLOOD BY AUTOMATED COUNT: 13.1 % (ref 12.3–15.4)
GLUCOSE UR STRIP-MCNC: NEGATIVE MG/DL
GLUCOSE UR STRIP-MCNC: NEGATIVE MG/DL
HCT VFR BLD AUTO: 38.5 % (ref 34–46.6)
HGB BLD-MCNC: 12.8 G/DL (ref 12–15.9)
HGB UR QL STRIP.AUTO: ABNORMAL
HOLD SPECIMEN: NORMAL
KETONES UR QL STRIP: NEGATIVE
KETONES UR QL: NEGATIVE
LEUKOCYTE EST, POC: ABNORMAL
LEUKOCYTE ESTERASE UR QL STRIP.AUTO: ABNORMAL
MCH RBC QN AUTO: 30.2 PG (ref 26.6–33)
MCHC RBC AUTO-ENTMCNC: 33.2 G/DL (ref 31.5–35.7)
MCV RBC AUTO: 90.8 FL (ref 79–97)
NITRITE UR QL STRIP: NEGATIVE
NITRITE UR-MCNC: POSITIVE MG/ML
PH UR STRIP.AUTO: 5.5 [PH] (ref 5–8)
PH UR: 5.5 [PH] (ref 5–8)
PLATELET # BLD AUTO: 148 10*3/MM3 (ref 140–450)
PMV BLD AUTO: 11.5 FL (ref 6–12)
PROT UR QL STRIP: ABNORMAL
PROT UR STRIP-MCNC: ABNORMAL MG/DL
RBC # BLD AUTO: 4.24 10*6/MM3 (ref 3.77–5.28)
RBC # UR STRIP: NEGATIVE /UL
SP GR UR STRIP: 1.02 (ref 1–1.03)
SP GR UR: 1.02 (ref 1–1.03)
UROBILINOGEN UR QL STRIP: ABNORMAL
UROBILINOGEN UR QL: NORMAL
WBC NRBC COR # BLD AUTO: 8.64 10*3/MM3 (ref 3.4–10.8)

## 2025-08-22 PROCEDURE — 87186 SC STD MICRODIL/AGAR DIL: CPT | Performed by: NURSE PRACTITIONER

## 2025-08-22 PROCEDURE — 36415 COLL VENOUS BLD VENIPUNCTURE: CPT | Performed by: NURSE PRACTITIONER

## 2025-08-22 RX ORDER — ALBUTEROL SULFATE 0.83 MG/ML
2.5 SOLUTION RESPIRATORY (INHALATION) EVERY 4 HOURS PRN
Qty: 180 ML | Refills: 0 | Status: SHIPPED | OUTPATIENT
Start: 2025-08-22

## 2025-08-22 RX ORDER — OXYCODONE AND ACETAMINOPHEN 10; 325 MG/1; MG/1
1 TABLET ORAL EVERY 8 HOURS PRN
Qty: 90 TABLET | Refills: 0 | Status: SHIPPED | OUTPATIENT
Start: 2025-08-22

## 2025-08-22 RX ORDER — LEVOFLOXACIN 500 MG/1
500 TABLET, FILM COATED ORAL DAILY
Qty: 7 TABLET | Refills: 0 | Status: SHIPPED | OUTPATIENT
Start: 2025-08-22

## 2025-08-22 RX ORDER — DEXLANSOPRAZOLE 60 MG/1
60 CAPSULE, DELAYED RELEASE ORAL DAILY
Qty: 90 CAPSULE | Refills: 2 | Status: SHIPPED | OUTPATIENT
Start: 2025-08-22

## 2025-08-22 RX ORDER — GABAPENTIN 100 MG/1
100 CAPSULE ORAL NIGHTLY
Qty: 30 CAPSULE | Refills: 1 | Status: SHIPPED | OUTPATIENT
Start: 2025-08-22

## 2025-08-22 RX ORDER — ATENOLOL 100 MG/1
TABLET ORAL
COMMUNITY

## 2025-08-22 RX ORDER — ALBUTEROL SULFATE 90 UG/1
2 INHALANT RESPIRATORY (INHALATION) EVERY 6 HOURS PRN
Qty: 18 G | Refills: 0 | Status: SHIPPED | OUTPATIENT
Start: 2025-08-22

## 2025-08-22 RX ORDER — FLUTICASONE PROPIONATE 50 MCG
SPRAY, SUSPENSION (ML) NASAL
COMMUNITY

## 2025-08-22 RX ORDER — BUDESONIDE, GLYCOPYRROLATE, AND FORMOTEROL FUMARATE 160; 9; 4.8 UG/1; UG/1; UG/1
2 AEROSOL, METERED RESPIRATORY (INHALATION) 2 TIMES DAILY
Qty: 10.7 G | Refills: 2 | Status: SHIPPED | OUTPATIENT
Start: 2025-08-22

## 2025-08-22 RX ORDER — OLOPATADINE HYDROCHLORIDE 1 MG/ML
1 SOLUTION OPHTHALMIC 2 TIMES DAILY
Qty: 3 ML | Refills: 2 | Status: SHIPPED | OUTPATIENT
Start: 2025-08-22

## 2025-08-22 RX ORDER — MONTELUKAST SODIUM 10 MG/1
10 TABLET ORAL NIGHTLY
Qty: 30 TABLET | Refills: 1 | Status: SHIPPED | OUTPATIENT
Start: 2025-08-22

## 2025-08-23 LAB
25(OH)D3 SERPL-MCNC: 53.4 NG/ML (ref 30–100)
ALBUMIN SERPL-MCNC: 4 G/DL (ref 3.5–5.2)
ALBUMIN/GLOB SERPL: 1.3 G/DL
ALP SERPL-CCNC: 52 U/L (ref 39–117)
ALT SERPL W P-5'-P-CCNC: 22 U/L (ref 1–33)
ANION GAP SERPL CALCULATED.3IONS-SCNC: 14.6 MMOL/L (ref 5–15)
AST SERPL-CCNC: 25 U/L (ref 1–32)
BACTERIA UR QL AUTO: ABNORMAL /HPF
BILIRUB SERPL-MCNC: 0.5 MG/DL (ref 0–1.2)
BUN SERPL-MCNC: 33 MG/DL (ref 8–23)
BUN/CREAT SERPL: 24.6 (ref 7–25)
CALCIUM SPEC-SCNC: 9.7 MG/DL (ref 8.6–10.5)
CHLORIDE SERPL-SCNC: 101 MMOL/L (ref 98–107)
CHOLEST SERPL-MCNC: 168 MG/DL (ref 0–200)
CO2 SERPL-SCNC: 23.4 MMOL/L (ref 22–29)
CREAT SERPL-MCNC: 1.34 MG/DL (ref 0.57–1)
EGFRCR SERPLBLD CKD-EPI 2021: 43.3 ML/MIN/1.73
GLOBULIN UR ELPH-MCNC: 3 GM/DL
GLUCOSE SERPL-MCNC: 121 MG/DL (ref 65–99)
HBA1C MFR BLD: 6 % (ref 4.8–5.6)
HDLC SERPL-MCNC: 33 MG/DL (ref 40–60)
HYALINE CASTS UR QL AUTO: ABNORMAL /LPF
LDLC SERPL CALC-MCNC: 103 MG/DL (ref 0–100)
LDLC/HDLC SERPL: 2.99 {RATIO}
POTASSIUM SERPL-SCNC: 4 MMOL/L (ref 3.5–5.2)
PROT SERPL-MCNC: 7 G/DL (ref 6–8.5)
RBC # UR STRIP: ABNORMAL /HPF
REF LAB TEST METHOD: ABNORMAL
SODIUM SERPL-SCNC: 139 MMOL/L (ref 136–145)
SQUAMOUS #/AREA URNS HPF: ABNORMAL /HPF
TRIGL SERPL-MCNC: 181 MG/DL (ref 0–150)
TSH SERPL DL<=0.05 MIU/L-ACNC: 1.06 UIU/ML (ref 0.27–4.2)
VLDLC SERPL-MCNC: 32 MG/DL (ref 5–40)
WBC # UR STRIP: ABNORMAL /HPF
WBC CASTS #/AREA URNS LPF: PRESENT /LPF

## 2025-08-25 LAB — BACTERIA SPEC AEROBE CULT: ABNORMAL

## 2025-08-25 RX ORDER — CEPHALEXIN 500 MG/1
500 CAPSULE ORAL 3 TIMES DAILY
Qty: 21 CAPSULE | Refills: 0 | Status: SHIPPED | OUTPATIENT
Start: 2025-08-25 | End: 2025-09-01

## 2025-08-27 ENCOUNTER — PATIENT OUTREACH (OUTPATIENT)
Dept: FAMILY MEDICINE CLINIC | Facility: CLINIC | Age: 70
End: 2025-08-27
Payer: MEDICARE